# Patient Record
Sex: FEMALE | Race: WHITE | NOT HISPANIC OR LATINO | URBAN - METROPOLITAN AREA
[De-identification: names, ages, dates, MRNs, and addresses within clinical notes are randomized per-mention and may not be internally consistent; named-entity substitution may affect disease eponyms.]

---

## 2017-04-08 ENCOUNTER — EMERGENCY (EMERGENCY)
Facility: HOSPITAL | Age: 32
LOS: 1 days | Discharge: HOME | End: 2017-04-08
Attending: INTERNAL MEDICINE | Admitting: INTERNAL MEDICINE
Payer: COMMERCIAL

## 2017-04-08 VITALS
RESPIRATION RATE: 16 BRPM | DIASTOLIC BLOOD PRESSURE: 77 MMHG | OXYGEN SATURATION: 99 % | HEART RATE: 80 BPM | SYSTOLIC BLOOD PRESSURE: 118 MMHG | TEMPERATURE: 99 F

## 2017-04-08 VITALS
RESPIRATION RATE: 16 BRPM | DIASTOLIC BLOOD PRESSURE: 67 MMHG | OXYGEN SATURATION: 98 % | HEART RATE: 90 BPM | WEIGHT: 125 LBS | TEMPERATURE: 98 F | SYSTOLIC BLOOD PRESSURE: 112 MMHG | HEIGHT: 62 IN

## 2017-04-08 DIAGNOSIS — Z88.0 ALLERGY STATUS TO PENICILLIN: ICD-10-CM

## 2017-04-08 DIAGNOSIS — R07.9 CHEST PAIN, UNSPECIFIED: ICD-10-CM

## 2017-04-08 DIAGNOSIS — R07.89 OTHER CHEST PAIN: ICD-10-CM

## 2017-04-08 DIAGNOSIS — Z91.09 OTHER ALLERGY STATUS, OTHER THAN TO DRUGS AND BIOLOGICAL SUBSTANCES: ICD-10-CM

## 2017-04-08 LAB
ALBUMIN SERPL ELPH-MCNC: 3.9 G/DL — SIGNIFICANT CHANGE UP (ref 3.3–5)
ALP SERPL-CCNC: 82 U/L — SIGNIFICANT CHANGE UP (ref 40–120)
ALT FLD-CCNC: 22 U/L — SIGNIFICANT CHANGE UP (ref 12–78)
ANION GAP SERPL CALC-SCNC: 11 MMOL/L — SIGNIFICANT CHANGE UP (ref 5–17)
AST SERPL-CCNC: 15 U/L — SIGNIFICANT CHANGE UP (ref 15–37)
BILIRUB SERPL-MCNC: 0.4 MG/DL — SIGNIFICANT CHANGE UP (ref 0.2–1.2)
BUN SERPL-MCNC: 7 MG/DL — SIGNIFICANT CHANGE UP (ref 7–23)
CALCIUM SERPL-MCNC: 8.7 MG/DL — SIGNIFICANT CHANGE UP (ref 8.5–10.1)
CHLORIDE SERPL-SCNC: 107 MMOL/L — SIGNIFICANT CHANGE UP (ref 96–108)
CK SERPL-CCNC: 46 U/L — SIGNIFICANT CHANGE UP (ref 26–192)
CO2 SERPL-SCNC: 23 MMOL/L — SIGNIFICANT CHANGE UP (ref 22–31)
CREAT SERPL-MCNC: 0.68 MG/DL — SIGNIFICANT CHANGE UP (ref 0.5–1.3)
D DIMER BLD IA.RAPID-MCNC: <150 NG/ML DDU — SIGNIFICANT CHANGE UP
GLUCOSE SERPL-MCNC: 113 MG/DL — HIGH (ref 70–99)
HCG SERPL-ACNC: <1 MIU/ML — SIGNIFICANT CHANGE UP
HCT VFR BLD CALC: 38.6 % — SIGNIFICANT CHANGE UP (ref 34.5–45)
HGB BLD-MCNC: 12.9 G/DL — SIGNIFICANT CHANGE UP (ref 11.5–15.5)
MCHC RBC-ENTMCNC: 30.5 PG — SIGNIFICANT CHANGE UP (ref 27–34)
MCHC RBC-ENTMCNC: 33.4 GM/DL — SIGNIFICANT CHANGE UP (ref 32–36)
MCV RBC AUTO: 91.3 FL — SIGNIFICANT CHANGE UP (ref 80–100)
PLATELET # BLD AUTO: 286 K/UL — SIGNIFICANT CHANGE UP (ref 150–400)
POTASSIUM SERPL-MCNC: 3.5 MMOL/L — SIGNIFICANT CHANGE UP (ref 3.5–5.3)
POTASSIUM SERPL-SCNC: 3.5 MMOL/L — SIGNIFICANT CHANGE UP (ref 3.5–5.3)
PROT SERPL-MCNC: 7 G/DL — SIGNIFICANT CHANGE UP (ref 6–8.3)
RBC # BLD: 4.23 M/UL — SIGNIFICANT CHANGE UP (ref 3.8–5.2)
RBC # FLD: 10.5 % — SIGNIFICANT CHANGE UP (ref 10.3–14.5)
SODIUM SERPL-SCNC: 141 MMOL/L — SIGNIFICANT CHANGE UP (ref 135–145)
TROPONIN I SERPL-MCNC: 0.03 NG/ML — SIGNIFICANT CHANGE UP (ref 0.01–0.04)
WBC # BLD: 9.6 K/UL — SIGNIFICANT CHANGE UP (ref 3.8–10.5)
WBC # FLD AUTO: 9.6 K/UL — SIGNIFICANT CHANGE UP (ref 3.8–10.5)

## 2017-04-08 PROCEDURE — 71045 X-RAY EXAM CHEST 1 VIEW: CPT

## 2017-04-08 PROCEDURE — 99284 EMERGENCY DEPT VISIT MOD MDM: CPT | Mod: 25

## 2017-04-08 PROCEDURE — 93005 ELECTROCARDIOGRAM TRACING: CPT

## 2017-04-08 PROCEDURE — 99285 EMERGENCY DEPT VISIT HI MDM: CPT | Mod: 25

## 2017-04-08 PROCEDURE — 96374 THER/PROPH/DIAG INJ IV PUSH: CPT

## 2017-04-08 PROCEDURE — 80053 COMPREHEN METABOLIC PANEL: CPT

## 2017-04-08 PROCEDURE — 71010: CPT | Mod: 26

## 2017-04-08 PROCEDURE — 84484 ASSAY OF TROPONIN QUANT: CPT

## 2017-04-08 PROCEDURE — 85379 FIBRIN DEGRADATION QUANT: CPT

## 2017-04-08 PROCEDURE — 85027 COMPLETE CBC AUTOMATED: CPT

## 2017-04-08 PROCEDURE — 84702 CHORIONIC GONADOTROPIN TEST: CPT

## 2017-04-08 PROCEDURE — 82550 ASSAY OF CK (CPK): CPT

## 2017-04-08 RX ORDER — KETOROLAC TROMETHAMINE 30 MG/ML
30 SYRINGE (ML) INJECTION ONCE
Qty: 0 | Refills: 0 | Status: DISCONTINUED | OUTPATIENT
Start: 2017-04-08 | End: 2017-04-08

## 2017-04-08 RX ORDER — IBUPROFEN 200 MG
1 TABLET ORAL
Qty: 30 | Refills: 0 | OUTPATIENT
Start: 2017-04-08 | End: 2017-04-18

## 2017-04-08 RX ORDER — CYCLOBENZAPRINE HYDROCHLORIDE 10 MG/1
10 TABLET, FILM COATED ORAL ONCE
Qty: 0 | Refills: 0 | Status: COMPLETED | OUTPATIENT
Start: 2017-04-08 | End: 2017-04-08

## 2017-04-08 RX ORDER — CYCLOBENZAPRINE HYDROCHLORIDE 10 MG/1
1 TABLET, FILM COATED ORAL
Qty: 30 | Refills: 0 | OUTPATIENT
Start: 2017-04-08 | End: 2017-04-18

## 2017-04-08 RX ADMIN — Medication 30 MILLIGRAM(S): at 06:53

## 2017-04-08 RX ADMIN — CYCLOBENZAPRINE HYDROCHLORIDE 10 MILLIGRAM(S): 10 TABLET, FILM COATED ORAL at 06:53

## 2017-04-08 NOTE — ED PROVIDER NOTE - CONSTITUTIONAL, MLM
Well appearing, well nourished, awake, alert, oriented to person, place, time/situation and in moderate  distress. normal...

## 2017-04-08 NOTE — ED ADULT NURSE NOTE - CAS TRG GEN SKIN COLOR
Bedside and Verbal shift change report given to STALIN Guajardo RN (oncoming nurse) by Indigo Georges RN  (offgoing nurse). Report included the following information SBAR, Kardex, Intake/Output and MAR. Normal for race

## 2017-04-08 NOTE — ED PROVIDER NOTE - OBJECTIVE STATEMENT
32 y/o w/f h/o allergies, she awoke with left chest pain, reproducible with palpation and inspiration

## 2017-04-08 NOTE — ED PROVIDER NOTE - SIGNIFICANT NEGATIVE FINDINGS
no headache, no syncope , no SOB, no palpitations, no n/v/d, no urinary symptoms, no bleeding. no neuro changes.

## 2017-04-08 NOTE — ED ADULT NURSE NOTE - OBJECTIVE STATEMENT
31 year old female presents to the ED with c/o chest pain. Pt states she was woken up by severe chest pain that radiated down the left arm. Pt A+O x 4. Slightly anxious. Pt has an allergy to Penecillin and other unknown allergies yet to be determined. Pt denies headache. Respirations even and unlabored. S1/S2 normal. Pt reports slight nausea and 2 bouts of diarrhea this morning. No swelling/ edema noted. skin, nails, and lips free of pallor/ cyanosis. VSS. Pt LMP 3 weeks ago.

## 2018-11-20 ENCOUNTER — EMERGENCY (EMERGENCY)
Facility: HOSPITAL | Age: 33
LOS: 1 days | Discharge: HOME | End: 2018-11-20
Attending: EMERGENCY MEDICINE
Payer: COMMERCIAL

## 2018-11-20 VITALS
SYSTOLIC BLOOD PRESSURE: 129 MMHG | WEIGHT: 130.07 LBS | RESPIRATION RATE: 15 BRPM | DIASTOLIC BLOOD PRESSURE: 85 MMHG | HEART RATE: 99 BPM | OXYGEN SATURATION: 98 % | HEIGHT: 62 IN | TEMPERATURE: 98 F

## 2018-11-20 VITALS
DIASTOLIC BLOOD PRESSURE: 71 MMHG | RESPIRATION RATE: 14 BRPM | HEART RATE: 95 BPM | OXYGEN SATURATION: 96 % | TEMPERATURE: 98 F | SYSTOLIC BLOOD PRESSURE: 106 MMHG

## 2018-11-20 LAB
ALBUMIN SERPL ELPH-MCNC: 4.1 G/DL — SIGNIFICANT CHANGE UP (ref 3.3–5)
ALP SERPL-CCNC: 72 U/L — SIGNIFICANT CHANGE UP (ref 40–120)
ALT FLD-CCNC: 31 U/L — SIGNIFICANT CHANGE UP (ref 12–78)
ANION GAP SERPL CALC-SCNC: 10 MMOL/L — SIGNIFICANT CHANGE UP (ref 5–17)
AST SERPL-CCNC: 22 U/L — SIGNIFICANT CHANGE UP (ref 15–37)
BILIRUB SERPL-MCNC: 0.4 MG/DL — SIGNIFICANT CHANGE UP (ref 0.2–1.2)
BUN SERPL-MCNC: 9 MG/DL — SIGNIFICANT CHANGE UP (ref 7–23)
CALCIUM SERPL-MCNC: 8.9 MG/DL — SIGNIFICANT CHANGE UP (ref 8.5–10.1)
CHLORIDE SERPL-SCNC: 104 MMOL/L — SIGNIFICANT CHANGE UP (ref 96–108)
CK SERPL-CCNC: 45 U/L — SIGNIFICANT CHANGE UP (ref 26–192)
CO2 SERPL-SCNC: 26 MMOL/L — SIGNIFICANT CHANGE UP (ref 22–31)
CREAT SERPL-MCNC: 0.81 MG/DL — SIGNIFICANT CHANGE UP (ref 0.5–1.3)
D DIMER BLD IA.RAPID-MCNC: <150 NG/ML DDU — SIGNIFICANT CHANGE UP
GLUCOSE SERPL-MCNC: 107 MG/DL — HIGH (ref 70–99)
HCG SERPL-ACNC: <1 MIU/ML — SIGNIFICANT CHANGE UP
HCT VFR BLD CALC: 39.3 % — SIGNIFICANT CHANGE UP (ref 34.5–45)
HGB BLD-MCNC: 13.4 G/DL — SIGNIFICANT CHANGE UP (ref 11.5–15.5)
MCHC RBC-ENTMCNC: 30 PG — SIGNIFICANT CHANGE UP (ref 27–34)
MCHC RBC-ENTMCNC: 34.1 GM/DL — SIGNIFICANT CHANGE UP (ref 32–36)
MCV RBC AUTO: 87.9 FL — SIGNIFICANT CHANGE UP (ref 80–100)
NRBC # BLD: 0 /100 WBCS — SIGNIFICANT CHANGE UP (ref 0–0)
PLATELET # BLD AUTO: 355 K/UL — SIGNIFICANT CHANGE UP (ref 150–400)
POTASSIUM SERPL-MCNC: 3.9 MMOL/L — SIGNIFICANT CHANGE UP (ref 3.5–5.3)
POTASSIUM SERPL-SCNC: 3.9 MMOL/L — SIGNIFICANT CHANGE UP (ref 3.5–5.3)
PROT SERPL-MCNC: 7.9 G/DL — SIGNIFICANT CHANGE UP (ref 6–8.3)
RBC # BLD: 4.47 M/UL — SIGNIFICANT CHANGE UP (ref 3.8–5.2)
RBC # FLD: 11.3 % — SIGNIFICANT CHANGE UP (ref 10.3–14.5)
SODIUM SERPL-SCNC: 140 MMOL/L — SIGNIFICANT CHANGE UP (ref 135–145)
TROPONIN I SERPL-MCNC: <.015 NG/ML — SIGNIFICANT CHANGE UP (ref 0.01–0.04)
WBC # BLD: 12.67 K/UL — HIGH (ref 3.8–10.5)
WBC # FLD AUTO: 12.67 K/UL — HIGH (ref 3.8–10.5)

## 2018-11-20 PROCEDURE — 82550 ASSAY OF CK (CPK): CPT

## 2018-11-20 PROCEDURE — 96361 HYDRATE IV INFUSION ADD-ON: CPT

## 2018-11-20 PROCEDURE — 93005 ELECTROCARDIOGRAM TRACING: CPT

## 2018-11-20 PROCEDURE — 84484 ASSAY OF TROPONIN QUANT: CPT

## 2018-11-20 PROCEDURE — 84702 CHORIONIC GONADOTROPIN TEST: CPT

## 2018-11-20 PROCEDURE — 71275 CT ANGIOGRAPHY CHEST: CPT

## 2018-11-20 PROCEDURE — 93010 ELECTROCARDIOGRAM REPORT: CPT

## 2018-11-20 PROCEDURE — 99285 EMERGENCY DEPT VISIT HI MDM: CPT

## 2018-11-20 PROCEDURE — 71046 X-RAY EXAM CHEST 2 VIEWS: CPT

## 2018-11-20 PROCEDURE — 99284 EMERGENCY DEPT VISIT MOD MDM: CPT

## 2018-11-20 PROCEDURE — 85027 COMPLETE CBC AUTOMATED: CPT

## 2018-11-20 PROCEDURE — 71046 X-RAY EXAM CHEST 2 VIEWS: CPT | Mod: 26

## 2018-11-20 PROCEDURE — 36415 COLL VENOUS BLD VENIPUNCTURE: CPT

## 2018-11-20 PROCEDURE — 71275 CT ANGIOGRAPHY CHEST: CPT | Mod: 26

## 2018-11-20 PROCEDURE — 99284 EMERGENCY DEPT VISIT MOD MDM: CPT | Mod: 25

## 2018-11-20 PROCEDURE — 80053 COMPREHEN METABOLIC PANEL: CPT

## 2018-11-20 PROCEDURE — 85379 FIBRIN DEGRADATION QUANT: CPT

## 2018-11-20 PROCEDURE — 96360 HYDRATION IV INFUSION INIT: CPT | Mod: XU

## 2018-11-20 RX ORDER — SODIUM CHLORIDE 9 MG/ML
1000 INJECTION INTRAMUSCULAR; INTRAVENOUS; SUBCUTANEOUS ONCE
Qty: 0 | Refills: 0 | Status: COMPLETED | OUTPATIENT
Start: 2018-11-20 | End: 2018-11-20

## 2018-11-20 RX ADMIN — SODIUM CHLORIDE 1000 MILLILITER(S): 9 INJECTION INTRAMUSCULAR; INTRAVENOUS; SUBCUTANEOUS at 16:04

## 2018-11-20 RX ADMIN — SODIUM CHLORIDE 1000 MILLILITER(S): 9 INJECTION INTRAMUSCULAR; INTRAVENOUS; SUBCUTANEOUS at 14:18

## 2018-11-20 NOTE — ED PROVIDER NOTE - CHPI ED SYMPTOMS NEG
no chills/no dizziness/no shortness of breath/no diaphoresis/no vomiting/no back pain/no nausea/no chest pain/no cough/no syncope/no fever

## 2018-11-20 NOTE — ED PROVIDER NOTE - CARE PROVIDER_API CALL
Kosta Costa), Internal Medicine  [address] [place] [address]  Phone: [phone]  Fax: [phone]

## 2018-11-20 NOTE — ED PROVIDER NOTE - RESPIRATORY, MLM
Breath sounds clear and equal bilaterally. no W/r/R. nl resp effort. no acc muscle use. Breath sounds clear and equal bilaterally. no W/r/R. nl resp effort. no acc muscle use. Some reproducible tend.

## 2018-11-20 NOTE — ED ADULT NURSE NOTE - OBJECTIVE STATEMENT
c/o ,midsternal chest pain SOB since last night.  states has been treated for respinfection but symptoms has been getting worsePt also reports syncopal episode this am

## 2018-11-20 NOTE — CONSULT NOTE ADULT - ASSESSMENT
33 F no PMH w chest pain.  - CP is reproducible on exam. No signs of ischemia on EKG.   - No need to repeat Jory.   - Likely musculoskeletal from cough  - Conservative RX with NSAIDS PRN/ Tyelonol  - Cont Rx for URI  - Near syncope likely from increased vagal tone   - Encourage PO hydration.   - If ED calvo negative can get an outpt echo.   - Patient agrees with the plan and will contact our office to arrange followup.

## 2018-11-20 NOTE — CONSULT NOTE ADULT - SUBJECTIVE AND OBJECTIVE BOX
CHIEF COMPLAINT: Patient is a 33y old  Female who presents with a chief complaint of chest pain    HPI: 33 F no PMH p/w chest pain. She has been having a URI for abvout 1 week. [facility] of dyspnea pleuritic chest pain. She was given dose of steroids and SDoxy. She has been taking robitussen DM. She notes last night all o a sudden had a midsternal, left sided (under breast() sharp chest pain that was worse with deep breathing. Started to feel nausea and lightheaded. Then sxs continued. went to work were felt very lightheaded when standing. She ahd a near syncopal episode and came to ed. Denies any  palpitations, PND, orthopnea,   syncope, lower extremity edema, stroke like symptoms.   Currently feels better after ivf.       EKG: SR     REVIEW OF SYSTEMS:   All other review of systems are negative unless indicated above    PAST MEDICAL & SURGICAL HISTORY:  No pertinent past medical history      SOCIAL HISTORY:  No tobacco, ethanol, or drug abuse.    FAMILY HISTORY:    No family history of acute MI or sudden cardiac death.           Allergies    penicillin (Short breath)  severely allg to mold  Intolerances        Home meds:  Home Medications:  ZyrTEC:  (20 Nov 2018 13:31)        VITAL SIGNS:   Vital Signs Last 24 Hrs  T(C): 36.6 (20 Nov 2018 13:27), Max: 36.6 (20 Nov 2018 13:27)  T(F): 97.8 (20 Nov 2018 13:27), Max: 97.8 (20 Nov 2018 13:27)  HR: 99 (20 Nov 2018 13:27) (99 - 99)  BP: 129/85 (20 Nov 2018 13:27) (129/85 - 129/85)  BP(mean): --  RR: 15 (20 Nov 2018 13:27) (15 - 15)  SpO2: 98% (20 Nov 2018 13:27) (98% - 98%)    I&O's Summary      On Exam:     Constitutional: NAD, awake   HEENT: Dry Mucous Membranes, Anicteric  Pulmonary: Non-labored, breath sounds are clear bilaterally, No wheezing, rales or rhonchi  Cardiovascular: Regular, S1 and S2, No murmurs, rubs, gallops or clicks  Gastrointestinal: Bowel Sounds present, soft, nontender.   Lymph: No peripheral edema. No lymphadenopathy.  Skin: No visible rashes or ulcers.  Psych:  Mood & affect appropriate  Muscular: reproducible chest pain    LABS: All Labs Reviewed:                        13.4   12.67 )-----------( 355      ( 20 Nov 2018 14:19 )             39.3     20 Nov 2018 14:19    140    |  104    |  9      ----------------------------<  107    3.9     |  26     |  0.81     Ca    8.9        20 Nov 2018 14:19    TPro  7.9    /  Alb  4.1    /  TBili  0.4    /  DBili  x      /  AST  22     /  ALT  31     /  AlkPhos  72     20 Nov 2018 14:19      CARDIAC MARKERS ( 20 Nov 2018 14:19 )  <.015 ng/mL / x     / 45 U/L / x     / x          Blood Culture:         RADIOLOGY:

## 2018-11-20 NOTE — ED PROVIDER NOTE - PROGRESS NOTE DETAILS
Pt seen bY Dr VALERIE Costa, ok to [facility], for outpt fu. Pt seen bY alka Blackwell to [place] [facility], for outpt fu.  At length discussion with patient regarding nature of the chest pain. Discussed results of all diagnostic testing in ED. Discussed chest pain precautions and instructions. Patient demonstrates understanding that a cardiac cause of the chest pain has not been totally excluded, but at this time there is no evidence to warrant an inpatient workup.  Discussed the importance of continued follow-up with Cardiology as outpatient to complete cardiac workup.

## 2018-11-20 NOTE — ED ADULT TRIAGE NOTE - CHIEF COMPLAINT QUOTE
midsternal chest pain, shortness of breath and dizziness since last night, syncopal episode this morning at work

## 2018-11-20 NOTE — ED PROVIDER NOTE - NSFOLLOWUPINSTRUCTIONS_ED_ALL_ED_FT
1)  Follow-up with your Primary Medical Doctor or referred doctor. Call today / next business day for prompt follow-up.  2) Follow-up with Cardiology as discussed. Call today / next business day for prompt follow-up and further workup and evaluation.  3) Return to Emergency room for any worsening or persistent pain, shortness of breath, weakness, fever, abdominal pain, dizziness, passing out, unexplained pain in arms, legs, back, any vomiting, feeling like your heart is racing,  or any other concerning symptoms.  4) See attached instruction sheets for additional information, including information regarding signs and symptoms to look out for, reasons to seek immediate care and other important instructions.   5) Motrin as needed, with food

## 2018-11-20 NOTE — ED PROVIDER NOTE - OBJECTIVE STATEMENT
34 yo F p/w sp recent URI, on abx. Was feeling better past few days. Pt awoke this am ~ 1am, was co chest heaviness, dyspnea. Pt then got lightheaded, but didn't pass out. Then again with episode of severe CP while at work, dizziness, followed by a syncopal episode. Pt co mild cp now, no other acute co. No fever/chills. No numb/ting/focal weak. no agg/allev factors. NO other inj or co. no other prior events, no recent RIOS / easy fatigue.    No recent travel / immob

## 2018-11-24 NOTE — ED ADULT TRIAGE NOTE - NSWEIGHTCALCTOOLDRUG_GEN_A_CORE
Message   Recorded as Task   Date: 06/24/2017 05:20 PM, Created By: Georgina,User   Task Name: Hospital Discharge Alert   Assigned To: BILLIE BLACKWELL   Regarding Patient: QUIN ROMERO, [facility], location: [facility] on: Sat Luther CST      used

## 2019-11-13 ENCOUNTER — EMERGENCY (EMERGENCY)
Facility: HOSPITAL | Age: 34
LOS: 1 days | Discharge: HOME | End: 2019-11-13
Attending: EMERGENCY MEDICINE | Admitting: EMERGENCY MEDICINE
Payer: COMMERCIAL

## 2019-11-13 VITALS
TEMPERATURE: 100 F | OXYGEN SATURATION: 97 % | HEART RATE: 88 BPM | SYSTOLIC BLOOD PRESSURE: 120 MMHG | WEIGHT: 134.92 LBS | RESPIRATION RATE: 16 BRPM | HEIGHT: 62 IN | DIASTOLIC BLOOD PRESSURE: 63 MMHG

## 2019-11-13 VITALS
OXYGEN SATURATION: 98 % | SYSTOLIC BLOOD PRESSURE: 103 MMHG | DIASTOLIC BLOOD PRESSURE: 64 MMHG | RESPIRATION RATE: 14 BRPM | HEART RATE: 78 BPM | TEMPERATURE: 98 F

## 2019-11-13 LAB
ALBUMIN SERPL ELPH-MCNC: 4.3 G/DL — SIGNIFICANT CHANGE UP (ref 3.3–5)
ALP SERPL-CCNC: 91 U/L — SIGNIFICANT CHANGE UP (ref 40–120)
ALT FLD-CCNC: 24 U/L — SIGNIFICANT CHANGE UP (ref 12–78)
ANION GAP SERPL CALC-SCNC: 7 MMOL/L — SIGNIFICANT CHANGE UP (ref 5–17)
APPEARANCE UR: CLEAR — SIGNIFICANT CHANGE UP
AST SERPL-CCNC: 19 U/L — SIGNIFICANT CHANGE UP (ref 15–37)
BASOPHILS # BLD AUTO: 0.07 K/UL — SIGNIFICANT CHANGE UP (ref 0–0.2)
BASOPHILS NFR BLD AUTO: 0.7 % — SIGNIFICANT CHANGE UP (ref 0–2)
BILIRUB SERPL-MCNC: 0.5 MG/DL — SIGNIFICANT CHANGE UP (ref 0.2–1.2)
BILIRUB UR-MCNC: ABNORMAL
BUN SERPL-MCNC: 11 MG/DL — SIGNIFICANT CHANGE UP (ref 7–23)
CALCIUM SERPL-MCNC: 8.8 MG/DL — SIGNIFICANT CHANGE UP (ref 8.5–10.1)
CHLORIDE SERPL-SCNC: 105 MMOL/L — SIGNIFICANT CHANGE UP (ref 96–108)
CO2 SERPL-SCNC: 25 MMOL/L — SIGNIFICANT CHANGE UP (ref 22–31)
COLOR SPEC: YELLOW — SIGNIFICANT CHANGE UP
CREAT SERPL-MCNC: 0.95 MG/DL — SIGNIFICANT CHANGE UP (ref 0.5–1.3)
DIFF PNL FLD: ABNORMAL
EOSINOPHIL # BLD AUTO: 0.22 K/UL — SIGNIFICANT CHANGE UP (ref 0–0.5)
EOSINOPHIL NFR BLD AUTO: 2.1 % — SIGNIFICANT CHANGE UP (ref 0–6)
GLUCOSE SERPL-MCNC: 97 MG/DL — SIGNIFICANT CHANGE UP (ref 70–99)
GLUCOSE UR QL: NEGATIVE — SIGNIFICANT CHANGE UP
HCG SERPL-ACNC: <1 MIU/ML — SIGNIFICANT CHANGE UP
HCT VFR BLD CALC: 39.5 % — SIGNIFICANT CHANGE UP (ref 34.5–45)
HGB BLD-MCNC: 13.3 G/DL — SIGNIFICANT CHANGE UP (ref 11.5–15.5)
IMM GRANULOCYTES NFR BLD AUTO: 0.2 % — SIGNIFICANT CHANGE UP (ref 0–1.5)
KETONES UR-MCNC: ABNORMAL
LACTATE SERPL-SCNC: 0.8 MMOL/L — SIGNIFICANT CHANGE UP (ref 0.7–2)
LEUKOCYTE ESTERASE UR-ACNC: ABNORMAL
LYMPHOCYTES # BLD AUTO: 2.22 K/UL — SIGNIFICANT CHANGE UP (ref 1–3.3)
LYMPHOCYTES # BLD AUTO: 21.1 % — SIGNIFICANT CHANGE UP (ref 13–44)
MCHC RBC-ENTMCNC: 29.5 PG — SIGNIFICANT CHANGE UP (ref 27–34)
MCHC RBC-ENTMCNC: 33.7 GM/DL — SIGNIFICANT CHANGE UP (ref 32–36)
MCV RBC AUTO: 87.6 FL — SIGNIFICANT CHANGE UP (ref 80–100)
MONOCYTES # BLD AUTO: 0.64 K/UL — SIGNIFICANT CHANGE UP (ref 0–0.9)
MONOCYTES NFR BLD AUTO: 6.1 % — SIGNIFICANT CHANGE UP (ref 2–14)
NEUTROPHILS # BLD AUTO: 7.36 K/UL — SIGNIFICANT CHANGE UP (ref 1.8–7.4)
NEUTROPHILS NFR BLD AUTO: 69.8 % — SIGNIFICANT CHANGE UP (ref 43–77)
NITRITE UR-MCNC: NEGATIVE — SIGNIFICANT CHANGE UP
NRBC # BLD: 0 /100 WBCS — SIGNIFICANT CHANGE UP (ref 0–0)
PH UR: 6 — SIGNIFICANT CHANGE UP (ref 5–8)
PLATELET # BLD AUTO: 328 K/UL — SIGNIFICANT CHANGE UP (ref 150–400)
POTASSIUM SERPL-MCNC: 4.4 MMOL/L — SIGNIFICANT CHANGE UP (ref 3.5–5.3)
POTASSIUM SERPL-SCNC: 4.4 MMOL/L — SIGNIFICANT CHANGE UP (ref 3.5–5.3)
PROT SERPL-MCNC: 7.9 G/DL — SIGNIFICANT CHANGE UP (ref 6–8.3)
PROT UR-MCNC: 25 MG/DL
RBC # BLD: 4.51 M/UL — SIGNIFICANT CHANGE UP (ref 3.8–5.2)
RBC # FLD: 11.8 % — SIGNIFICANT CHANGE UP (ref 10.3–14.5)
SODIUM SERPL-SCNC: 137 MMOL/L — SIGNIFICANT CHANGE UP (ref 135–145)
SP GR SPEC: 1.02 — SIGNIFICANT CHANGE UP (ref 1.01–1.02)
UROBILINOGEN FLD QL: 1
WBC # BLD: 10.53 K/UL — HIGH (ref 3.8–10.5)
WBC # FLD AUTO: 10.53 K/UL — HIGH (ref 3.8–10.5)

## 2019-11-13 PROCEDURE — 87086 URINE CULTURE/COLONY COUNT: CPT

## 2019-11-13 PROCEDURE — 85027 COMPLETE CBC AUTOMATED: CPT

## 2019-11-13 PROCEDURE — 81001 URINALYSIS AUTO W/SCOPE: CPT

## 2019-11-13 PROCEDURE — 96375 TX/PRO/DX INJ NEW DRUG ADDON: CPT

## 2019-11-13 PROCEDURE — 74176 CT ABD & PELVIS W/O CONTRAST: CPT

## 2019-11-13 PROCEDURE — 80053 COMPREHEN METABOLIC PANEL: CPT

## 2019-11-13 PROCEDURE — 74176 CT ABD & PELVIS W/O CONTRAST: CPT | Mod: 26

## 2019-11-13 PROCEDURE — 84702 CHORIONIC GONADOTROPIN TEST: CPT

## 2019-11-13 PROCEDURE — 96374 THER/PROPH/DIAG INJ IV PUSH: CPT

## 2019-11-13 PROCEDURE — 99284 EMERGENCY DEPT VISIT MOD MDM: CPT | Mod: 25

## 2019-11-13 PROCEDURE — 83605 ASSAY OF LACTIC ACID: CPT

## 2019-11-13 PROCEDURE — 87040 BLOOD CULTURE FOR BACTERIA: CPT

## 2019-11-13 PROCEDURE — 76856 US EXAM PELVIC COMPLETE: CPT | Mod: 26

## 2019-11-13 PROCEDURE — 99284 EMERGENCY DEPT VISIT MOD MDM: CPT

## 2019-11-13 PROCEDURE — 36415 COLL VENOUS BLD VENIPUNCTURE: CPT

## 2019-11-13 PROCEDURE — 96361 HYDRATE IV INFUSION ADD-ON: CPT

## 2019-11-13 PROCEDURE — 76856 US EXAM PELVIC COMPLETE: CPT

## 2019-11-13 RX ORDER — MORPHINE SULFATE 50 MG/1
4 CAPSULE, EXTENDED RELEASE ORAL ONCE
Refills: 0 | Status: DISCONTINUED | OUTPATIENT
Start: 2019-11-13 | End: 2019-11-13

## 2019-11-13 RX ORDER — ONDANSETRON 8 MG/1
4 TABLET, FILM COATED ORAL ONCE
Refills: 0 | Status: COMPLETED | OUTPATIENT
Start: 2019-11-13 | End: 2019-11-13

## 2019-11-13 RX ORDER — SODIUM CHLORIDE 9 MG/ML
1000 INJECTION INTRAMUSCULAR; INTRAVENOUS; SUBCUTANEOUS ONCE
Refills: 0 | Status: COMPLETED | OUTPATIENT
Start: 2019-11-13 | End: 2019-11-13

## 2019-11-13 RX ORDER — ONDANSETRON 8 MG/1
1 TABLET, FILM COATED ORAL
Qty: 12 | Refills: 0
Start: 2019-11-13 | End: 2019-11-15

## 2019-11-13 RX ORDER — OXYCODONE AND ACETAMINOPHEN 5; 325 MG/1; MG/1
1 TABLET ORAL ONCE
Refills: 0 | Status: DISCONTINUED | OUTPATIENT
Start: 2019-11-13 | End: 2019-11-13

## 2019-11-13 RX ADMIN — SODIUM CHLORIDE 1000 MILLILITER(S): 9 INJECTION INTRAMUSCULAR; INTRAVENOUS; SUBCUTANEOUS at 15:00

## 2019-11-13 RX ADMIN — MORPHINE SULFATE 4 MILLIGRAM(S): 50 CAPSULE, EXTENDED RELEASE ORAL at 15:12

## 2019-11-13 RX ADMIN — ONDANSETRON 4 MILLIGRAM(S): 8 TABLET, FILM COATED ORAL at 15:13

## 2019-11-13 RX ADMIN — SODIUM CHLORIDE 1000 MILLILITER(S): 9 INJECTION INTRAMUSCULAR; INTRAVENOUS; SUBCUTANEOUS at 16:00

## 2019-11-13 RX ADMIN — MORPHINE SULFATE 4 MILLIGRAM(S): 50 CAPSULE, EXTENDED RELEASE ORAL at 18:34

## 2019-11-13 RX ADMIN — OXYCODONE AND ACETAMINOPHEN 1 TABLET(S): 5; 325 TABLET ORAL at 18:34

## 2019-11-13 NOTE — ED ADULT NURSE NOTE - OBJECTIVE STATEMENT
Received the patient in the Er. Patient is alert and oriented. Skin warm and dry. C/O Left flank pain. Patient is diagnosed with Kidney stone.

## 2019-11-13 NOTE — ED PROVIDER NOTE - OBJECTIVE STATEMENT
34 y female presents with left flank pain, states pain began saturday, noted urgency, frequency, hematuria, fever tmax 2 days ago 101.   was seen in urgent care Sunday, had ct done.  result faxed to ED: " 0.2 cm left renal calculus, nonobstructive, no hydro, no bladder calculus.  Copious stool in the colon, right ovary prominence, can be secondary to dominant follicle".  [facility] was started on bactrim  abx, No PMD, No urologist

## 2019-11-13 NOTE — ED PROVIDER NOTE - PATIENT PORTAL LINK FT
You can access the FollowMyHealth Patient Portal offered by [facility] by registering at the following website: [email or web address]. By joining [employer]’s FollowMyHealth portal, you will also be able to view your health information using other applications (apps) compatible with our system.

## 2019-11-13 NOTE — ED ADULT TRIAGE NOTE - CHIEF COMPLAINT QUOTE
patient came in ED with c/o persistent left flank pain X 2 days with nausea and vomiting and low grade fever. on arrival temp is 100.0F. patient was diagnosed with Kidney Stone last Sunday.

## 2019-11-13 NOTE — ED PROVIDER NOTE - PROGRESS NOTE DETAILS
results discussed, ct .2 mm nonobstructing stone, us normal, copy of results given, patient is on bactrim , rx percocet, zofran sent to pharmacy, copy of results given,  given information for urologist Dr Boston, call tomorrow to arrange follow up , any worsening symptoms return to ED

## 2019-11-14 LAB
CULTURE RESULTS: SIGNIFICANT CHANGE UP
SPECIMEN SOURCE: SIGNIFICANT CHANGE UP

## 2019-11-18 LAB
CULTURE RESULTS: SIGNIFICANT CHANGE UP
CULTURE RESULTS: SIGNIFICANT CHANGE UP
SPECIMEN SOURCE: SIGNIFICANT CHANGE UP
SPECIMEN SOURCE: SIGNIFICANT CHANGE UP

## 2020-02-18 NOTE — ED ADULT NURSE NOTE - CADM POA URETHRAL CATHETER
No Afib    BPH (benign prostatic hyperplasia)    CHF (congestive heart failure)    Glaucoma    Hearing loss    Osteoporosis Afib    BPH (benign prostatic hyperplasia)    CHF (congestive heart failure)    Glaucoma    Hearing loss    Inguinal hernia  right  Osteoporosis

## 2020-06-08 NOTE — ED ADULT TRIAGE NOTE - AS O2 DELIVERY
Patient Education     Hernia (Adult)    A hernia can happen when there is a weakness or defect in the wall of the abdomen or groin. Intestines or nearby tissues may move from their usual location and push through the weakness in the wall. This can cause a hernia (bulge) you may see or feel.  Causes and risk factors   A hernia may be present at birth. Or it may be caused by the wear and tear of daily living. Certain factors can make a hernia more likely. These can include:  · Heavy lifting  · Straining, whether from lifting, movement, or constipation  · Chronic cough  · Injury to the abdominal wall  · Excess weight  · Pregnancy  · Prior surgery  · Older age  · Family history of hernia  Symptoms  Symptoms of a hernia may come on suddenly. Or they may appear slowly over time. Some common symptoms include:  · Bulge in the groin area, around the navel, or in the scrotum (the bulge may get bigger when you stand and go away when you lie down)  · Pain or pressure around the bulge  · Pain during activities such as lifting, coughing, or sneezing  · A feeling of weakness or pressure in the groin  · Pain or swelling in the scrotum  Types of hernias  There are different types of hernia. The type you have depends on its location:  · Inguinal. This type is in the groin or scrotum. It is more common in men. But, women can get this hernia, too.  · Femoral. This type is in the groin, upper thigh (where the leg bends), or labia. It is more common in women.  · Ventral. This type is in the abdominal wall.  · Umbilical. This type occurs around the navel (belly button).  · Incisional. This type occurs at the site of a previous surgery.  The condition of the hernia can help determine how urgently it needs to be treated.  · Reducible. It goes back in by itself, or it can be pushed back in.  · Irreducible. It can’t be pushed back in.  · Incarcerated/strangulated. The intestine is trapped (incarcerated). If this happens, you won’t be able  to push the bulge back in. If the incarcerated hernia isn’t treated, it may become strangulated. This means the area loses blood supply and the tissue may die. This requires emergency surgery. You need treatment right away.  In most cases, a hernia will not heal on its own.You may need surgery to repair the defect in the abdominal wall or groin. You’ll be told more about surgery, if needed.  If your symptoms are not severe, treatment may sometimes be delayed. In such cases, you will need regular follow-up visits with the provider. You’ll be asked to keep track of your symptoms and to watch for signs of more serious problems. You may also be given guidelines similar to the home care instructions below.  Home care  To help keep a hernia from getting worse, you may be advised to:  · Avoid heavy lifting and straining as directed.  · Take steps to prevent constipation, such as eating more fiber and drinking more water. This may help reduce straining that can occur when having a bowel movement. Reducing straining may help keep your symptoms from getting worse.  · Maintain a healthy weight or lose excess weight. This can help reduce strain on abdominal muscles and tissues.  · Stop smoking. This can help prevent coughing that may also strain abdominal muscles and tissues.  Follow-up care  Follow up with your healthcare provider, or as directed. If imaging tests were done, they will be reviewed a doctor. You will be told the results and any new findings that may affect your care.  When to seek medical advice  Call your healthcare provider right away if any of these occur:  · Hernia hardens, swells, or grows larger  · Hernia can no longer be pushed back in  · Pain moves to the lower right abdomen (just below the waistline), or spreads to the back  Call 911  Call 911 if any of these occur:  · Severe pain, redness, or tenderness in the area near the hernia  · Pain worsens quickly and doesn’t get better  · Inability to have a  bowel movement or pass gas  · Fever of 100.4°F (38°C) or higher, or as directed by your healthcare provider  Date Last Reviewed: 3/1/2018  © 5488-3941 [email or web address]. [address] [place] [address], [place] [address]. All rights reserved. This information is not intended as a substitute for professional medical care. Always follow your healthcare professional's instructions.            room air

## 2020-07-18 ENCOUNTER — EMERGENCY (EMERGENCY)
Facility: HOSPITAL | Age: 35
LOS: 1 days | Discharge: HOME | End: 2020-07-18
Attending: EMERGENCY MEDICINE | Admitting: EMERGENCY MEDICINE
Payer: COMMERCIAL

## 2020-07-18 VITALS
SYSTOLIC BLOOD PRESSURE: 110 MMHG | WEIGHT: 139.99 LBS | OXYGEN SATURATION: 95 % | HEART RATE: 73 BPM | HEIGHT: 62 IN | TEMPERATURE: 98 F | RESPIRATION RATE: 15 BRPM | DIASTOLIC BLOOD PRESSURE: 79 MMHG

## 2020-07-18 VITALS
DIASTOLIC BLOOD PRESSURE: 71 MMHG | TEMPERATURE: 99 F | SYSTOLIC BLOOD PRESSURE: 105 MMHG | RESPIRATION RATE: 18 BRPM | HEART RATE: 63 BPM | OXYGEN SATURATION: 97 %

## 2020-07-18 LAB
ALBUMIN SERPL ELPH-MCNC: 4.1 G/DL — SIGNIFICANT CHANGE UP (ref 3.3–5)
ALP SERPL-CCNC: 70 U/L — SIGNIFICANT CHANGE UP (ref 40–120)
ALT FLD-CCNC: 18 U/L — SIGNIFICANT CHANGE UP (ref 12–78)
ANION GAP SERPL CALC-SCNC: 4 MMOL/L — LOW (ref 5–17)
APPEARANCE UR: CLEAR — SIGNIFICANT CHANGE UP
AST SERPL-CCNC: 14 U/L — LOW (ref 15–37)
BASOPHILS # BLD AUTO: 0.06 K/UL — SIGNIFICANT CHANGE UP (ref 0–0.2)
BASOPHILS NFR BLD AUTO: 0.6 % — SIGNIFICANT CHANGE UP (ref 0–2)
BILIRUB SERPL-MCNC: 0.5 MG/DL — SIGNIFICANT CHANGE UP (ref 0.2–1.2)
BILIRUB UR-MCNC: NEGATIVE — SIGNIFICANT CHANGE UP
BUN SERPL-MCNC: 10 MG/DL — SIGNIFICANT CHANGE UP (ref 7–23)
CALCIUM SERPL-MCNC: 9.2 MG/DL — SIGNIFICANT CHANGE UP (ref 8.5–10.1)
CHLORIDE SERPL-SCNC: 108 MMOL/L — SIGNIFICANT CHANGE UP (ref 96–108)
CO2 SERPL-SCNC: 28 MMOL/L — SIGNIFICANT CHANGE UP (ref 22–31)
COLOR SPEC: YELLOW — SIGNIFICANT CHANGE UP
CREAT SERPL-MCNC: 0.8 MG/DL — SIGNIFICANT CHANGE UP (ref 0.5–1.3)
DIFF PNL FLD: NEGATIVE — SIGNIFICANT CHANGE UP
EOSINOPHIL # BLD AUTO: 0.17 K/UL — SIGNIFICANT CHANGE UP (ref 0–0.5)
EOSINOPHIL NFR BLD AUTO: 1.8 % — SIGNIFICANT CHANGE UP (ref 0–6)
GLUCOSE SERPL-MCNC: 105 MG/DL — HIGH (ref 70–99)
GLUCOSE UR QL: NEGATIVE — SIGNIFICANT CHANGE UP
HCG UR QL: NEGATIVE — SIGNIFICANT CHANGE UP
HCT VFR BLD CALC: 38.5 % — SIGNIFICANT CHANGE UP (ref 34.5–45)
HGB BLD-MCNC: 13.4 G/DL — SIGNIFICANT CHANGE UP (ref 11.5–15.5)
IMM GRANULOCYTES NFR BLD AUTO: 0.3 % — SIGNIFICANT CHANGE UP (ref 0–1.5)
KETONES UR-MCNC: NEGATIVE — SIGNIFICANT CHANGE UP
LEUKOCYTE ESTERASE UR-ACNC: NEGATIVE — SIGNIFICANT CHANGE UP
LIDOCAIN IGE QN: 115 U/L — SIGNIFICANT CHANGE UP (ref 73–393)
LYMPHOCYTES # BLD AUTO: 2.79 K/UL — SIGNIFICANT CHANGE UP (ref 1–3.3)
LYMPHOCYTES # BLD AUTO: 29 % — SIGNIFICANT CHANGE UP (ref 13–44)
MCHC RBC-ENTMCNC: 30.2 PG — SIGNIFICANT CHANGE UP (ref 27–34)
MCHC RBC-ENTMCNC: 34.8 GM/DL — SIGNIFICANT CHANGE UP (ref 32–36)
MCV RBC AUTO: 86.9 FL — SIGNIFICANT CHANGE UP (ref 80–100)
MONOCYTES # BLD AUTO: 0.56 K/UL — SIGNIFICANT CHANGE UP (ref 0–0.9)
MONOCYTES NFR BLD AUTO: 5.8 % — SIGNIFICANT CHANGE UP (ref 2–14)
NEUTROPHILS # BLD AUTO: 6.01 K/UL — SIGNIFICANT CHANGE UP (ref 1.8–7.4)
NEUTROPHILS NFR BLD AUTO: 62.5 % — SIGNIFICANT CHANGE UP (ref 43–77)
NITRITE UR-MCNC: NEGATIVE — SIGNIFICANT CHANGE UP
NRBC # BLD: 0 /100 WBCS — SIGNIFICANT CHANGE UP (ref 0–0)
PH UR: 6 — SIGNIFICANT CHANGE UP (ref 5–8)
PLATELET # BLD AUTO: 302 K/UL — SIGNIFICANT CHANGE UP (ref 150–400)
POTASSIUM SERPL-MCNC: 3.7 MMOL/L — SIGNIFICANT CHANGE UP (ref 3.5–5.3)
POTASSIUM SERPL-SCNC: 3.7 MMOL/L — SIGNIFICANT CHANGE UP (ref 3.5–5.3)
PROT SERPL-MCNC: 8 G/DL — SIGNIFICANT CHANGE UP (ref 6–8.3)
PROT UR-MCNC: NEGATIVE — SIGNIFICANT CHANGE UP
RBC # BLD: 4.43 M/UL — SIGNIFICANT CHANGE UP (ref 3.8–5.2)
RBC # FLD: 11.6 % — SIGNIFICANT CHANGE UP (ref 10.3–14.5)
SODIUM SERPL-SCNC: 140 MMOL/L — SIGNIFICANT CHANGE UP (ref 135–145)
SP GR SPEC: 1.01 — SIGNIFICANT CHANGE UP (ref 1.01–1.02)
UROBILINOGEN FLD QL: NEGATIVE — SIGNIFICANT CHANGE UP
WBC # BLD: 9.62 K/UL — SIGNIFICANT CHANGE UP (ref 3.8–10.5)
WBC # FLD AUTO: 9.62 K/UL — SIGNIFICANT CHANGE UP (ref 3.8–10.5)

## 2020-07-18 PROCEDURE — 83690 ASSAY OF LIPASE: CPT

## 2020-07-18 PROCEDURE — 74176 CT ABD & PELVIS W/O CONTRAST: CPT

## 2020-07-18 PROCEDURE — 74176 CT ABD & PELVIS W/O CONTRAST: CPT | Mod: 26

## 2020-07-18 PROCEDURE — 80053 COMPREHEN METABOLIC PANEL: CPT

## 2020-07-18 PROCEDURE — 96374 THER/PROPH/DIAG INJ IV PUSH: CPT | Mod: XU

## 2020-07-18 PROCEDURE — 36415 COLL VENOUS BLD VENIPUNCTURE: CPT

## 2020-07-18 PROCEDURE — 76856 US EXAM PELVIC COMPLETE: CPT | Mod: 26

## 2020-07-18 PROCEDURE — 76856 US EXAM PELVIC COMPLETE: CPT

## 2020-07-18 PROCEDURE — 99284 EMERGENCY DEPT VISIT MOD MDM: CPT | Mod: 25

## 2020-07-18 PROCEDURE — 81025 URINE PREGNANCY TEST: CPT

## 2020-07-18 PROCEDURE — 81003 URINALYSIS AUTO W/O SCOPE: CPT

## 2020-07-18 PROCEDURE — 85027 COMPLETE CBC AUTOMATED: CPT

## 2020-07-18 PROCEDURE — 99285 EMERGENCY DEPT VISIT HI MDM: CPT

## 2020-07-18 PROCEDURE — 96375 TX/PRO/DX INJ NEW DRUG ADDON: CPT

## 2020-07-18 PROCEDURE — 84702 CHORIONIC GONADOTROPIN TEST: CPT

## 2020-07-18 PROCEDURE — 96361 HYDRATE IV INFUSION ADD-ON: CPT

## 2020-07-18 RX ORDER — KETOROLAC TROMETHAMINE 30 MG/ML
30 SYRINGE (ML) INJECTION ONCE
Refills: 0 | Status: DISCONTINUED | OUTPATIENT
Start: 2020-07-18 | End: 2020-07-18

## 2020-07-18 RX ORDER — SODIUM CHLORIDE 9 MG/ML
1000 INJECTION INTRAMUSCULAR; INTRAVENOUS; SUBCUTANEOUS ONCE
Refills: 0 | Status: COMPLETED | OUTPATIENT
Start: 2020-07-18 | End: 2020-07-18

## 2020-07-18 RX ORDER — ONDANSETRON 8 MG/1
4 TABLET, FILM COATED ORAL ONCE
Refills: 0 | Status: COMPLETED | OUTPATIENT
Start: 2020-07-18 | End: 2020-07-18

## 2020-07-18 RX ADMIN — Medication 30 MILLIGRAM(S): at 18:15

## 2020-07-18 RX ADMIN — SODIUM CHLORIDE 1000 MILLILITER(S): 9 INJECTION INTRAMUSCULAR; INTRAVENOUS; SUBCUTANEOUS at 16:08

## 2020-07-18 RX ADMIN — Medication 30 MILLIGRAM(S): at 18:45

## 2020-07-18 RX ADMIN — ONDANSETRON 4 MILLIGRAM(S): 8 TABLET, FILM COATED ORAL at 16:08

## 2020-07-18 RX ADMIN — SODIUM CHLORIDE 1000 MILLILITER(S): 9 INJECTION INTRAMUSCULAR; INTRAVENOUS; SUBCUTANEOUS at 17:12

## 2020-07-18 NOTE — ED PROVIDER NOTE - PHYSICAL EXAMINATION
Constitutional: Awake, Alert, non-toxic. NAD. Well appearing, well nourished.   HEAD: Normocephalic, atraumatic.   EYES: EOM intact, conjunctiva and sclera are clear bilaterally.   ENT: No rhinorrhea, patent, mucous membranes pink/moist, no drooling or stridor.   NECK: Supple, non-tender  CARDIOVASCULAR: Normal S1, S2; regular rate and rhythm.  RESPIRATORY: Normal respiratory effort; breath sounds CTAB, no wheezes, rhonchi, or rales. Speaking in full sentences. No accessory muscle use.   ABDOMEN: Soft; (+) right inguinal TTP, no palpable hernia, (+) pain with flexion of right hip, (+) healed RLQ surgical scar, negative CVA TTP, no RUQ TTP, negative Pacheco sign.   EXTREMITIES: Full passive and active ROM in all extremities; non-tender to palpation; distal pulses palpable and symmetric  SKIN: Warm, dry; good skin turgor, no apparent lesions or rashes, no ecchymosis, brisk capillary refill.  NEURO: A&O x3. Sensory and motor functions are grossly intact. Speech is normal. Appearance and judgement seem appropriate for gender and age.

## 2020-07-18 NOTE — ED PROVIDER NOTE - ATTENDING CONTRIBUTION TO CARE
35 yo white female, developed left lower back area pain last week that then wrapped around to right side and then into RLQ and felt like a pop. Since that time, movement increases pain, immobilization helps pain. No fever, chills, nausea, vomiting, diarrhea, vaginal bleeding, dysuria or hematuria. I agree with plan and management outlined by PA.

## 2020-07-18 NOTE — ED ADULT NURSE NOTE - OBJECTIVE STATEMENT
Pt received in bed alert and oriented with the c/o upper abd pain and flank pain. Pt states that pain started 3 days ago and it seems to be getting progressively worse. As per Md's orders IV robert placed blood specimen obtained and sent to the lab. Meds given and tolerated well. Pt now prepped for US. Nursing care ongoing and safety maintained.

## 2020-07-18 NOTE — ED PROVIDER NOTE - OBJECTIVE STATEMENT
35 y/o female without reported PMHx sent from urgent care c/o abdominal pain x 1 week. Pt reports she last Sunday she had pain in lower left back which improved. pt reports she right sided lower abd pain, non-radiating to back, and currently 8/10. pt reports urgent care recommended CT due to concern for hernia and US due to concern for ovarian cyst. pt notes she has hx of kidney stones in which this does not feel similar. pt reports pain mainly in right lower pelvis region, hx of appendectomy. pt admits to nausea. Pt denies dysuria, hematuria, fall, incontinence, numbness/weakness, vomiting, fever, vaginal dc, or any other complaints.

## 2020-07-18 NOTE — ED PROVIDER NOTE - CARE PROVIDER_API CALL
Narciso Wesley ()  Internal Medicine  [address] [place] [address]  Phone: [phone]  Fax: [phone]  Follow Up Time: 1-3 Days

## 2020-07-18 NOTE — ED PROVIDER NOTE - PATIENT PORTAL LINK FT
You can access the FollowMyHealth Patient Portal offered by [facility] by registering at the following website: [email or web address]. By joining [employer]’s FollowMyHealth portal, you will also be able to view your health information using other applications (apps) compatible with our system.

## 2020-07-18 NOTE — ED PROVIDER NOTE - CLINICAL SUMMARY MEDICAL DECISION MAKING FREE TEXT BOX
sent from urgent care c/o abdominal pain x 1 week. Pt reports she last Sunday she had pain in lower left back which improved. pt reports she right sided lower abd pain, non-radiating to back, and currently 8/10. pt reports urgent care recommended CT due to concern for hernia and US due to concern for ovarian cyst. PLan includes labs, US r/o ovarian cyst, UA r/o UTI, urine pregnancy, re-assess

## 2020-07-18 NOTE — ED PROVIDER NOTE - NSFOLLOWUPINSTRUCTIONS_ED_ALL_ED_FT
Follow up with your primary care doctor and Gastroenterologist as soon as possible. Return to ED for any worsening condition.     Many things can cause abdominal pain. Many times, abdominal pain is not caused by a disease and will improve without treatment. Your health care provider will do a physical exam to determine if there is a dangerous cause of your pain; blood tests and imaging may help determine the cause of your pain. However, in many cases, no cause may be found and you may need further testing as an outpatient. Monitor your abdominal pain for any changes.     SEEK IMMEDIATE MEDICAL CARE IF YOU HAVE ANY OF THE FOLLOWING SYMPTOMS: worsening abdominal pain, uncontrollable vomiting, profuse diarrhea, inability to have bowel movements or pass gas, black or bloody stools, fever accompanying chest pain or back pain, or fainting. These symptoms may represent a serious problem that is an emergency. Do not wait to see if the symptoms will go away. Get medical help right away. Call 911 and do not drive yourself to the hospital.

## 2020-11-14 NOTE — ED ADULT TRIAGE NOTE - HEIGHT IN FEET
5
Follow up with your primary care doctor within 1 week  Take Bactrim 1 tablet twice daily for 7 days  Return to the ER with any worsening or concerning symptoms, fever/chills, throat pain or swelling, facial swelling or any other concerns.

## 2022-01-04 ENCOUNTER — APPOINTMENT (OUTPATIENT)
Dept: INTERNAL MEDICINE | Facility: CLINIC | Age: 37
End: 2022-01-04
Payer: COMMERCIAL

## 2022-01-04 PROCEDURE — 99204 OFFICE O/P NEW MOD 45 MIN: CPT | Mod: 95

## 2022-01-04 NOTE — HISTORY OF PRESENT ILLNESS
[Home] : at home, [unfilled] , at the time of the visit. [Medical Office: ([facility])___] : at the medical office located in  [Verbal consent obtained from patient] : the patient, [unfilled] [FreeTextEntry1] : 36 year old female with no significant medical history was diagnosed with COVID on 12/22.  She has had lingering cough, chest tightness, and overall not feeling well since then.  She has been monitoring her pulse ox and it has been around 96/97.  Upon ambulation her Pulse ox sustained at 97 but she states last night it was maintaining 92.  She also did a teledoc video yesterday and  was given albuterol and a nasal spray.  \par \par Patient appeared well on video, good color. She is eating and drinking normall. \par She did receive only one JNJ shot in April. \par \par At this time will do home blood work and will follow up in 2 days. \par Decadon given for 5 days. \par willl also add advair\par \par number to the [facility] provided if additional information is required within the next two days

## 2022-01-07 ENCOUNTER — APPOINTMENT (OUTPATIENT)
Dept: INTERNAL MEDICINE | Facility: CLINIC | Age: 37
End: 2022-01-07
Payer: COMMERCIAL

## 2022-01-07 LAB
ALBUMIN SERPL ELPH-MCNC: 4.8 G/DL
ALP BLD-CCNC: 77 U/L
ALT SERPL-CCNC: 24 U/L
ANION GAP SERPL CALC-SCNC: 13 MMOL/L
AST SERPL-CCNC: 21 U/L
BASOPHILS # BLD AUTO: 0.03 K/UL
BASOPHILS NFR BLD AUTO: 0.4 %
BILIRUB SERPL-MCNC: 0.3 MG/DL
BUN SERPL-MCNC: 9 MG/DL
CALCIUM SERPL-MCNC: 10 MG/DL
CHLORIDE SERPL-SCNC: 105 MMOL/L
CO2 SERPL-SCNC: 22 MMOL/L
CREAT SERPL-MCNC: 0.8 MG/DL
CRP SERPL-MCNC: <3 MG/L
DEPRECATED D DIMER PPP IA-ACNC: <150 NG/ML DDU
EOSINOPHIL # BLD AUTO: 0.11 K/UL
EOSINOPHIL NFR BLD AUTO: 1.5 %
FERRITIN SERPL-MCNC: 85 NG/ML
GLUCOSE SERPL-MCNC: 106 MG/DL
HCT VFR BLD CALC: 38.3 %
HGB BLD-MCNC: 12.8 G/DL
IMM GRANULOCYTES NFR BLD AUTO: 0.1 %
LYMPHOCYTES # BLD AUTO: 1.52 K/UL
LYMPHOCYTES NFR BLD AUTO: 20.4 %
MAN DIFF?: NORMAL
MCHC RBC-ENTMCNC: 30.1 PG
MCHC RBC-ENTMCNC: 33.4 GM/DL
MCV RBC AUTO: 90.1 FL
MONOCYTES # BLD AUTO: 0.23 K/UL
MONOCYTES NFR BLD AUTO: 3.1 %
NEUTROPHILS # BLD AUTO: 5.54 K/UL
NEUTROPHILS NFR BLD AUTO: 74.5 %
PLATELET # BLD AUTO: 334 K/UL
POTASSIUM SERPL-SCNC: 4.5 MMOL/L
PROT SERPL-MCNC: 7.3 G/DL
RBC # BLD: 4.25 M/UL
RBC # FLD: 11.7 %
SODIUM SERPL-SCNC: 141 MMOL/L
WBC # FLD AUTO: 7.44 K/UL

## 2022-01-07 PROCEDURE — 99213 OFFICE O/P EST LOW 20 MIN: CPT | Mod: 95

## 2022-01-07 NOTE — HISTORY OF PRESENT ILLNESS
[FreeTextEntry1] : 36 year old female diagnosed with COVID 12/22.  She currently is finally starting to feel better.  She states two nights ago she had an episode of chest pain.  She has been feeling well since and her pulse ox has been in the high 90s.  She has been taking medications as prescribed but not the albuterol because it is making her very jumpy. \par She does have a lingering cough which is worse at night making it hard for her to sleep. \par She will be out of work longer due to these symptoms. \par Overall she looks well. On ambulation her oxygen is maintaining high 90s. \par we will fu in 1 week

## 2022-01-13 ENCOUNTER — APPOINTMENT (OUTPATIENT)
Dept: PULMONOLOGY | Facility: CLINIC | Age: 37
End: 2022-01-13
Payer: COMMERCIAL

## 2022-01-13 PROCEDURE — 99213 OFFICE O/P EST LOW 20 MIN: CPT | Mod: 95

## 2022-01-13 NOTE — HISTORY OF PRESENT ILLNESS
[Home] : at home, [unfilled] , at the time of the visit. [Medical Office: ([facility])___] : at the medical office located in  [Verbal consent obtained from patient] : the patient, [unfilled] [FreeTextEntry1] : Follow-up telehealth visit, now post-COVID.\par \par 36-year-old woman, 1 J&J vaccine in spring, + December 22.\par \par Initial symptoms had including chest tightness, shortness of breath and cough.  She was treated with steroids, which greatly helped.  She has not been using her prescribed inhalers, since her respiratory symptoms have all completely resolved.  Only remaining complaint at this time is some residual headaches and brain fog.\par \par On exam, she looks well and in no apparent distress.\par \par Post COVID infection.  December.  Currently no respiratory symptoms, they have all resolved after treatment with steroids.\par Residual intermittent headaches brain fogginess typical post-COVID not yet 1 month out, no specific therapy, only time

## 2022-01-15 ENCOUNTER — APPOINTMENT (OUTPATIENT)
Dept: INTERNAL MEDICINE | Facility: CLINIC | Age: 37
End: 2022-01-15

## 2022-01-20 ENCOUNTER — NON-APPOINTMENT (OUTPATIENT)
Age: 37
End: 2022-01-20

## 2022-10-05 ENCOUNTER — APPOINTMENT (OUTPATIENT)
Dept: INTERNAL MEDICINE | Facility: CLINIC | Age: 37
End: 2022-10-05

## 2022-10-05 ENCOUNTER — NON-APPOINTMENT (OUTPATIENT)
Age: 37
End: 2022-10-05

## 2022-10-05 VITALS
HEART RATE: 84 BPM | WEIGHT: 150 LBS | TEMPERATURE: 97.3 F | DIASTOLIC BLOOD PRESSURE: 72 MMHG | RESPIRATION RATE: 16 BRPM | HEIGHT: 62 IN | OXYGEN SATURATION: 98 % | BODY MASS INDEX: 27.6 KG/M2 | SYSTOLIC BLOOD PRESSURE: 126 MMHG

## 2022-10-05 DIAGNOSIS — Z00.00 ENCOUNTER FOR GENERAL ADULT MEDICAL EXAMINATION W/OUT ABNORMAL FINDINGS: ICD-10-CM

## 2022-10-05 PROCEDURE — 99385 PREV VISIT NEW AGE 18-39: CPT

## 2022-10-05 RX ORDER — PROMETHAZINE HYDROCHLORIDE AND DEXTROMETHORPHAN HYDROBROMIDE ORAL SOLUTION 15; 6.25 MG/5ML; MG/5ML
6.25-15 SOLUTION ORAL
Qty: 1 | Refills: 0 | Status: COMPLETED | COMMUNITY
Start: 2022-01-07 | End: 2022-10-05

## 2022-10-05 RX ORDER — DEXAMETHASONE 6 MG/1
6 TABLET ORAL DAILY
Qty: 5 | Refills: 0 | Status: COMPLETED | COMMUNITY
Start: 2022-01-04 | End: 2022-10-05

## 2022-10-05 RX ORDER — FLUTICASONE PROPIONATE AND SALMETEROL 50; 100 UG/1; UG/1
100-50 POWDER RESPIRATORY (INHALATION)
Qty: 1 | Refills: 0 | Status: COMPLETED | COMMUNITY
Start: 2022-01-04 | End: 2022-10-05

## 2022-10-05 NOTE — ASSESSMENT
[FreeTextEntry1] : Hemochromatosis: She will follow-up with Dr Roberts. Check CBC, iron/ferritin. \par Adult hypothyroidism: On levothyroxine. Check TFTs. Will discuss results. \par HCM: Up to date on pap smear. Check labs. Will discuss results. \par

## 2022-10-05 NOTE — HEALTH RISK ASSESSMENT
[Good] : ~his/her~  mood as  good [Never] : Never [Yes] : Yes [2 - 4 times a month (2 pts)] : 2-4 times a month (2 points) [1 or 2 (0 pts)] : 1 or 2 (0 points) [Never (0 pts)] : Never (0 points) [0] : 2) Feeling down, depressed, or hopeless: Not at all (0) [PHQ-2 Negative - No further assessment needed] : PHQ-2 Negative - No further assessment needed [Patient reported PAP Smear was normal] : Patient reported PAP Smear was normal [Fully functional (bathing, dressing, toileting, transferring, walking, feeding)] : Fully functional (bathing, dressing, toileting, transferring, walking, feeding) [Fully functional (using the telephone, shopping, preparing meals, housekeeping, doing laundry, using] : Fully functional and needs no help or supervision to perform IADLs (using the telephone, shopping, preparing meals, housekeeping, doing laundry, using transportation, managing medications and managing finances) [[id number]] : 0 [Change in mental status noted] : No change in mental status noted [PapSmearDate] : 09/22

## 2022-10-05 NOTE — REVIEW OF SYSTEMS
[Fever] : no fever [Chills] : no chills [Night Sweats] : no night sweats [Discharge] : no discharge [Vision Problems] : no vision problems [Itching] : no itching [Earache] : no earache [Nasal Discharge] : no nasal discharge [Sore Throat] : no sore throat [Chest Pain] : no chest pain [Palpitations] : no palpitations [Lower Ext Edema] : no lower extremity edema [Shortness Of Breath] : no shortness of breath [Wheezing] : no wheezing [Cough] : no cough [Dyspnea on Exertion] : no dyspnea on exertion [Abdominal Pain] : no abdominal pain [Nausea] : no nausea [Constipation] : no constipation [Diarrhea] : diarrhea [Vomiting] : no vomiting [Dysuria] : no dysuria [Muscle Weakness] : no muscle weakness [Muscle Pain] : no muscle pain [Itching] : no itching [Skin Rash] : no skin rash [Headache] : no headache [Dizziness] : no dizziness [Suicidal] : not suicidal [Anxiety] : no anxiety [Depression] : no depression [Easy Bleeding] : no easy bleeding [Easy Bruising] : no easy bruising [Swollen Glands] : no swollen glands [FreeTextEntry7] : bloating

## 2022-10-05 NOTE — PHYSICAL EXAM
[No Acute Distress] : no acute distress [Normal Sclera/Conjunctiva] : normal sclera/conjunctiva [PERRL] : pupils equal round and reactive to light [EOMI] : extraocular movements intact [Normal TMs] : both tympanic membranes were normal [No Lymphadenopathy] : no lymphadenopathy [Supple] : supple [Thyroid Normal, No Nodules] : the thyroid was normal and there were no nodules present [No Respiratory Distress] : no respiratory distress  [No Accessory Muscle Use] : no accessory muscle use [Clear to Auscultation] : lungs were clear to auscultation bilaterally [Normal Rate] : normal rate  [Regular Rhythm] : with a regular rhythm [Normal S1, S2] : normal S1 and S2 [No Murmur] : no murmur heard [No Edema] : there was no peripheral edema [Soft] : abdomen soft [Non Tender] : non-tender [Non-distended] : non-distended [Normal Bowel Sounds] : normal bowel sounds [Normal Posterior Cervical Nodes] : no posterior cervical lymphadenopathy [Normal Anterior Cervical Nodes] : no anterior cervical lymphadenopathy [No Spinal Tenderness] : no spinal tenderness [Grossly Normal Strength/Tone] : grossly normal strength/tone [No Skin Lesions] : no skin lesions [No Focal Deficits] : no focal deficits [Normal Gait] : normal gait [Deep Tendon Reflexes (DTR)] : deep tendon reflexes were 2+ and symmetric [Normal Affect] : the affect was normal [Normal Insight/Judgement] : insight and judgment were intact

## 2022-10-06 ENCOUNTER — TRANSCRIPTION ENCOUNTER (OUTPATIENT)
Age: 37
End: 2022-10-06

## 2022-10-06 LAB
ALBUMIN SERPL ELPH-MCNC: 4.9 G/DL
ALP BLD-CCNC: 86 U/L
ALT SERPL-CCNC: 12 U/L
ANION GAP SERPL CALC-SCNC: 13 MMOL/L
AST SERPL-CCNC: 16 U/L
BASOPHILS # BLD AUTO: 0.07 K/UL
BASOPHILS NFR BLD AUTO: 1 %
BILIRUB SERPL-MCNC: 0.3 MG/DL
BUN SERPL-MCNC: 9 MG/DL
CALCIUM SERPL-MCNC: 9.9 MG/DL
CHLORIDE SERPL-SCNC: 103 MMOL/L
CHOLEST SERPL-MCNC: 222 MG/DL
CO2 SERPL-SCNC: 24 MMOL/L
CREAT SERPL-MCNC: 0.74 MG/DL
EGFR: 107 ML/MIN/1.73M2
EOSINOPHIL # BLD AUTO: 0.24 K/UL
EOSINOPHIL NFR BLD AUTO: 3.3 %
ESTIMATED AVERAGE GLUCOSE: 114 MG/DL
FERRITIN SERPL-MCNC: 50 NG/ML
GLUCOSE SERPL-MCNC: 107 MG/DL
HBA1C MFR BLD HPLC: 5.6 %
HCT VFR BLD CALC: 41.1 %
HDLC SERPL-MCNC: 50 MG/DL
HGB BLD-MCNC: 13.9 G/DL
IMM GRANULOCYTES NFR BLD AUTO: 0.1 %
IRON SATN MFR SERPL: 30 %
IRON SERPL-MCNC: 94 UG/DL
LDLC SERPL CALC-MCNC: 146 MG/DL
LYMPHOCYTES # BLD AUTO: 2.46 K/UL
LYMPHOCYTES NFR BLD AUTO: 33.8 %
MAN DIFF?: NORMAL
MCHC RBC-ENTMCNC: 31.2 PG
MCHC RBC-ENTMCNC: 33.8 GM/DL
MCV RBC AUTO: 92.4 FL
MONOCYTES # BLD AUTO: 0.43 K/UL
MONOCYTES NFR BLD AUTO: 5.9 %
NEUTROPHILS # BLD AUTO: 4.06 K/UL
NEUTROPHILS NFR BLD AUTO: 55.9 %
NONHDLC SERPL-MCNC: 172 MG/DL
PLATELET # BLD AUTO: 326 K/UL
POTASSIUM SERPL-SCNC: 4.3 MMOL/L
PROT SERPL-MCNC: 7.2 G/DL
RBC # BLD: 4.45 M/UL
RBC # FLD: 11.9 %
SODIUM SERPL-SCNC: 140 MMOL/L
T3 SERPL-MCNC: 93 NG/DL
THYROPEROXIDASE AB SERPL IA-ACNC: 960 IU/ML
TIBC SERPL-MCNC: 318 UG/DL
TRIGL SERPL-MCNC: 127 MG/DL
TSH SERPL-ACNC: 3.77 UIU/ML
UIBC SERPL-MCNC: 223 UG/DL
WBC # FLD AUTO: 7.27 K/UL

## 2022-10-09 LAB — T4 FREE SERPL-MCNC: 1.3 NG/DL

## 2023-01-27 ENCOUNTER — APPOINTMENT (OUTPATIENT)
Dept: INTERNAL MEDICINE | Facility: CLINIC | Age: 38
End: 2023-01-27
Payer: COMMERCIAL

## 2023-01-27 PROCEDURE — 99212 OFFICE O/P EST SF 10 MIN: CPT | Mod: 95

## 2023-01-27 NOTE — REVIEW OF SYSTEMS
[Negative] : Psychiatric [FreeTextEntry2] : see hpi  [FreeTextEntry4] : see hpi  [FreeTextEntry6] : see hpi

## 2023-01-27 NOTE — HISTORY OF PRESENT ILLNESS
[FreeTextEntry8] : This visit was provided via telehealth using real time 2-way audio/visual technology. The patient, MORE ANDREW was located at home, [address] [place] [address], at the time of the visit. The patient, MORE ANDREW and Provider participated in the telehealth encounter. The patient MORE ANDREW  provided verbal consent for this telehealth encounter.\par \par Patient is a 37 year old female with history of hypothyroidism, hemochromatosis who tested positive for COVID yesterday, Symptoms started on Tuesday. She has a fever, chills, body aches, nasal congestion, and cough. Denies any chest pain, palpitations, SOB. She is concerned as this is her fourth time having COVID and she states that the last time she had it she took a long time to recover and developed PNA that required antibiotics.

## 2023-01-27 NOTE — PLAN
[FreeTextEntry1] : COVID: symptoms appear to be mild however given her history of worsening COVID infection with superimposed bacterial PNA in the past, will treat with Paxlovid. Continue supportive measures, Tylenol PRN for fevers, Advil, OTC decongestants, nasal sprays. ER precautions provided. \par \par I have spent 15 minutes on this encounter.

## 2023-02-14 ENCOUNTER — APPOINTMENT (OUTPATIENT)
Dept: INTERNAL MEDICINE | Facility: CLINIC | Age: 38
End: 2023-02-14
Payer: COMMERCIAL

## 2023-02-14 VITALS
OXYGEN SATURATION: 98 % | HEART RATE: 80 BPM | WEIGHT: 175 LBS | BODY MASS INDEX: 32.2 KG/M2 | RESPIRATION RATE: 14 BRPM | HEIGHT: 62 IN | SYSTOLIC BLOOD PRESSURE: 120 MMHG | DIASTOLIC BLOOD PRESSURE: 74 MMHG | TEMPERATURE: 97.9 F

## 2023-02-14 PROCEDURE — 99213 OFFICE O/P EST LOW 20 MIN: CPT

## 2023-02-14 RX ORDER — NIRMATRELVIR AND RITONAVIR 300-100 MG
20 X 150 MG & KIT ORAL
Qty: 1 | Refills: 0 | Status: COMPLETED | COMMUNITY
Start: 2023-01-27 | End: 2023-02-14

## 2023-02-14 NOTE — ASSESSMENT
[FreeTextEntry1] : COVID: Resolving. Lungs clear. Follow-up if symptoms persist. \par Hypothyroidism: Check TFTs. Will adjust levothyroxine as needed. \par

## 2023-02-14 NOTE — HISTORY OF PRESENT ILLNESS
[Other: ___] : [unfilled] [FreeTextEntry6] : Reports fatigue and decreased exercise tolerance and temperature dysregulation after COVID. Denies CP, dyspnea. \par For hypothyroidism, notes weight gain (25 lbs). Has been on levothyroxine.

## 2023-02-14 NOTE — REVIEW OF SYSTEMS
[Fever] : no fever [Chills] : no chills [Fatigue] : fatigue [Night Sweats] : no night sweats [Chest Pain] : no chest pain [Palpitations] : no palpitations [Wheezing] : no wheezing [Cough] : no cough [Abdominal Pain] : no abdominal pain [Nausea] : no nausea [Constipation] : no constipation [Diarrhea] : diarrhea [Vomiting] : no vomiting [Dysuria] : no dysuria

## 2023-02-15 LAB
T3 SERPL-MCNC: 90 NG/DL
T4 FREE SERPL-MCNC: 1 NG/DL
THYROPEROXIDASE AB SERPL IA-ACNC: 930 IU/ML
TSH SERPL-ACNC: 3.52 UIU/ML

## 2023-06-22 ENCOUNTER — RX RENEWAL (OUTPATIENT)
Age: 38
End: 2023-06-22

## 2023-10-20 ENCOUNTER — NON-APPOINTMENT (OUTPATIENT)
Age: 38
End: 2023-10-20

## 2023-10-22 ENCOUNTER — EMERGENCY (EMERGENCY)
Facility: HOSPITAL | Age: 38
LOS: 0 days | Discharge: HOME | End: 2023-10-22
Attending: EMERGENCY MEDICINE
Payer: COMMERCIAL

## 2023-10-22 ENCOUNTER — RX RENEWAL (OUTPATIENT)
Age: 38
End: 2023-10-22

## 2023-10-22 VITALS
HEIGHT: 66 IN | WEIGHT: 160.06 LBS | SYSTOLIC BLOOD PRESSURE: 119 MMHG | TEMPERATURE: 98 F | OXYGEN SATURATION: 99 % | RESPIRATION RATE: 18 BRPM | DIASTOLIC BLOOD PRESSURE: 74 MMHG | HEART RATE: 70 BPM

## 2023-10-22 VITALS
DIASTOLIC BLOOD PRESSURE: 64 MMHG | OXYGEN SATURATION: 98 % | SYSTOLIC BLOOD PRESSURE: 113 MMHG | HEART RATE: 70 BPM | TEMPERATURE: 99 F | RESPIRATION RATE: 19 BRPM

## 2023-10-22 DIAGNOSIS — Z20.822 CONTACT WITH AND (SUSPECTED) EXPOSURE TO COVID-19: ICD-10-CM

## 2023-10-22 DIAGNOSIS — R20.2 PARESTHESIA OF SKIN: ICD-10-CM

## 2023-10-22 DIAGNOSIS — R53.1 WEAKNESS: ICD-10-CM

## 2023-10-22 DIAGNOSIS — A87.9 VIRAL MENINGITIS, UNSPECIFIED: ICD-10-CM

## 2023-10-22 DIAGNOSIS — E03.9 HYPOTHYROIDISM, UNSPECIFIED: ICD-10-CM

## 2023-10-22 DIAGNOSIS — Z88.0 ALLERGY STATUS TO PENICILLIN: ICD-10-CM

## 2023-10-22 LAB
ALBUMIN SERPL ELPH-MCNC: 4.2 G/DL — SIGNIFICANT CHANGE UP (ref 3.3–5)
ALBUMIN SERPL ELPH-MCNC: 4.2 G/DL — SIGNIFICANT CHANGE UP (ref 3.3–5)
ALP SERPL-CCNC: 57 U/L — SIGNIFICANT CHANGE UP (ref 40–120)
ALP SERPL-CCNC: 57 U/L — SIGNIFICANT CHANGE UP (ref 40–120)
ALT FLD-CCNC: 29 U/L — SIGNIFICANT CHANGE UP (ref 12–78)
ALT FLD-CCNC: 29 U/L — SIGNIFICANT CHANGE UP (ref 12–78)
ANION GAP SERPL CALC-SCNC: 8 MMOL/L — SIGNIFICANT CHANGE UP (ref 5–17)
ANION GAP SERPL CALC-SCNC: 8 MMOL/L — SIGNIFICANT CHANGE UP (ref 5–17)
APPEARANCE UR: CLEAR — SIGNIFICANT CHANGE UP
APPEARANCE UR: CLEAR — SIGNIFICANT CHANGE UP
APTT BLD: 31.8 SEC — SIGNIFICANT CHANGE UP (ref 24.5–35.6)
APTT BLD: 31.8 SEC — SIGNIFICANT CHANGE UP (ref 24.5–35.6)
AST SERPL-CCNC: 9 U/L — LOW (ref 15–37)
AST SERPL-CCNC: 9 U/L — LOW (ref 15–37)
BASOPHILS # BLD AUTO: 0.06 K/UL — SIGNIFICANT CHANGE UP (ref 0–0.2)
BASOPHILS # BLD AUTO: 0.06 K/UL — SIGNIFICANT CHANGE UP (ref 0–0.2)
BASOPHILS NFR BLD AUTO: 0.8 % — SIGNIFICANT CHANGE UP (ref 0–2)
BASOPHILS NFR BLD AUTO: 0.8 % — SIGNIFICANT CHANGE UP (ref 0–2)
BILIRUB SERPL-MCNC: 0.6 MG/DL — SIGNIFICANT CHANGE UP (ref 0.2–1.2)
BILIRUB SERPL-MCNC: 0.6 MG/DL — SIGNIFICANT CHANGE UP (ref 0.2–1.2)
BILIRUB UR-MCNC: NEGATIVE — SIGNIFICANT CHANGE UP
BILIRUB UR-MCNC: NEGATIVE — SIGNIFICANT CHANGE UP
BUN SERPL-MCNC: 11 MG/DL — SIGNIFICANT CHANGE UP (ref 7–23)
BUN SERPL-MCNC: 11 MG/DL — SIGNIFICANT CHANGE UP (ref 7–23)
CALCIUM SERPL-MCNC: 9.1 MG/DL — SIGNIFICANT CHANGE UP (ref 8.5–10.1)
CALCIUM SERPL-MCNC: 9.1 MG/DL — SIGNIFICANT CHANGE UP (ref 8.5–10.1)
CHLORIDE SERPL-SCNC: 110 MMOL/L — HIGH (ref 96–108)
CHLORIDE SERPL-SCNC: 110 MMOL/L — HIGH (ref 96–108)
CO2 SERPL-SCNC: 21 MMOL/L — LOW (ref 22–31)
CO2 SERPL-SCNC: 21 MMOL/L — LOW (ref 22–31)
COLOR SPEC: YELLOW — SIGNIFICANT CHANGE UP
COLOR SPEC: YELLOW — SIGNIFICANT CHANGE UP
CREAT SERPL-MCNC: 0.95 MG/DL — SIGNIFICANT CHANGE UP (ref 0.5–1.3)
CREAT SERPL-MCNC: 0.95 MG/DL — SIGNIFICANT CHANGE UP (ref 0.5–1.3)
DIFF PNL FLD: NEGATIVE — SIGNIFICANT CHANGE UP
DIFF PNL FLD: NEGATIVE — SIGNIFICANT CHANGE UP
EGFR: 79 ML/MIN/1.73M2 — SIGNIFICANT CHANGE UP
EGFR: 79 ML/MIN/1.73M2 — SIGNIFICANT CHANGE UP
EOSINOPHIL # BLD AUTO: 0.3 K/UL — SIGNIFICANT CHANGE UP (ref 0–0.5)
EOSINOPHIL # BLD AUTO: 0.3 K/UL — SIGNIFICANT CHANGE UP (ref 0–0.5)
EOSINOPHIL NFR BLD AUTO: 3.8 % — SIGNIFICANT CHANGE UP (ref 0–6)
EOSINOPHIL NFR BLD AUTO: 3.8 % — SIGNIFICANT CHANGE UP (ref 0–6)
GLUCOSE SERPL-MCNC: 124 MG/DL — HIGH (ref 70–99)
GLUCOSE SERPL-MCNC: 124 MG/DL — HIGH (ref 70–99)
GLUCOSE UR QL: NEGATIVE MG/DL — SIGNIFICANT CHANGE UP
GLUCOSE UR QL: NEGATIVE MG/DL — SIGNIFICANT CHANGE UP
HCG UR QL: NEGATIVE — SIGNIFICANT CHANGE UP
HCG UR QL: NEGATIVE — SIGNIFICANT CHANGE UP
HCT VFR BLD CALC: 37.2 % — SIGNIFICANT CHANGE UP (ref 34.5–45)
HCT VFR BLD CALC: 37.2 % — SIGNIFICANT CHANGE UP (ref 34.5–45)
HGB BLD-MCNC: 13.3 G/DL — SIGNIFICANT CHANGE UP (ref 11.5–15.5)
HGB BLD-MCNC: 13.3 G/DL — SIGNIFICANT CHANGE UP (ref 11.5–15.5)
IMM GRANULOCYTES NFR BLD AUTO: 0.1 % — SIGNIFICANT CHANGE UP (ref 0–0.9)
IMM GRANULOCYTES NFR BLD AUTO: 0.1 % — SIGNIFICANT CHANGE UP (ref 0–0.9)
INR BLD: 1.01 RATIO — SIGNIFICANT CHANGE UP (ref 0.85–1.18)
INR BLD: 1.01 RATIO — SIGNIFICANT CHANGE UP (ref 0.85–1.18)
KETONES UR-MCNC: ABNORMAL MG/DL
KETONES UR-MCNC: ABNORMAL MG/DL
LEUKOCYTE ESTERASE UR-ACNC: NEGATIVE — SIGNIFICANT CHANGE UP
LEUKOCYTE ESTERASE UR-ACNC: NEGATIVE — SIGNIFICANT CHANGE UP
LYMPHOCYTES # BLD AUTO: 3.09 K/UL — SIGNIFICANT CHANGE UP (ref 1–3.3)
LYMPHOCYTES # BLD AUTO: 3.09 K/UL — SIGNIFICANT CHANGE UP (ref 1–3.3)
LYMPHOCYTES # BLD AUTO: 39.6 % — SIGNIFICANT CHANGE UP (ref 13–44)
LYMPHOCYTES # BLD AUTO: 39.6 % — SIGNIFICANT CHANGE UP (ref 13–44)
MCHC RBC-ENTMCNC: 30.9 PG — SIGNIFICANT CHANGE UP (ref 27–34)
MCHC RBC-ENTMCNC: 30.9 PG — SIGNIFICANT CHANGE UP (ref 27–34)
MCHC RBC-ENTMCNC: 35.8 GM/DL — SIGNIFICANT CHANGE UP (ref 32–36)
MCHC RBC-ENTMCNC: 35.8 GM/DL — SIGNIFICANT CHANGE UP (ref 32–36)
MCV RBC AUTO: 86.3 FL — SIGNIFICANT CHANGE UP (ref 80–100)
MCV RBC AUTO: 86.3 FL — SIGNIFICANT CHANGE UP (ref 80–100)
MONOCYTES # BLD AUTO: 0.51 K/UL — SIGNIFICANT CHANGE UP (ref 0–0.9)
MONOCYTES # BLD AUTO: 0.51 K/UL — SIGNIFICANT CHANGE UP (ref 0–0.9)
MONOCYTES NFR BLD AUTO: 6.5 % — SIGNIFICANT CHANGE UP (ref 2–14)
MONOCYTES NFR BLD AUTO: 6.5 % — SIGNIFICANT CHANGE UP (ref 2–14)
NEUTROPHILS # BLD AUTO: 3.83 K/UL — SIGNIFICANT CHANGE UP (ref 1.8–7.4)
NEUTROPHILS # BLD AUTO: 3.83 K/UL — SIGNIFICANT CHANGE UP (ref 1.8–7.4)
NEUTROPHILS NFR BLD AUTO: 49.2 % — SIGNIFICANT CHANGE UP (ref 43–77)
NEUTROPHILS NFR BLD AUTO: 49.2 % — SIGNIFICANT CHANGE UP (ref 43–77)
NITRITE UR-MCNC: NEGATIVE — SIGNIFICANT CHANGE UP
NITRITE UR-MCNC: NEGATIVE — SIGNIFICANT CHANGE UP
PH UR: 6.5 — SIGNIFICANT CHANGE UP (ref 5–8)
PH UR: 6.5 — SIGNIFICANT CHANGE UP (ref 5–8)
PLATELET # BLD AUTO: 260 K/UL — SIGNIFICANT CHANGE UP (ref 150–400)
PLATELET # BLD AUTO: 260 K/UL — SIGNIFICANT CHANGE UP (ref 150–400)
POTASSIUM SERPL-MCNC: 3.4 MMOL/L — LOW (ref 3.5–5.3)
POTASSIUM SERPL-MCNC: 3.4 MMOL/L — LOW (ref 3.5–5.3)
POTASSIUM SERPL-SCNC: 3.4 MMOL/L — LOW (ref 3.5–5.3)
POTASSIUM SERPL-SCNC: 3.4 MMOL/L — LOW (ref 3.5–5.3)
PROT SERPL-MCNC: 7.5 GM/DL — SIGNIFICANT CHANGE UP (ref 6–8.3)
PROT SERPL-MCNC: 7.5 GM/DL — SIGNIFICANT CHANGE UP (ref 6–8.3)
PROT UR-MCNC: NEGATIVE MG/DL — SIGNIFICANT CHANGE UP
PROT UR-MCNC: NEGATIVE MG/DL — SIGNIFICANT CHANGE UP
PROTHROM AB SERPL-ACNC: 11.4 SEC — SIGNIFICANT CHANGE UP (ref 9.5–13)
PROTHROM AB SERPL-ACNC: 11.4 SEC — SIGNIFICANT CHANGE UP (ref 9.5–13)
RAPID RVP RESULT: SIGNIFICANT CHANGE UP
RAPID RVP RESULT: SIGNIFICANT CHANGE UP
RBC # BLD: 4.31 M/UL — SIGNIFICANT CHANGE UP (ref 3.8–5.2)
RBC # BLD: 4.31 M/UL — SIGNIFICANT CHANGE UP (ref 3.8–5.2)
RBC # FLD: 11.4 % — SIGNIFICANT CHANGE UP (ref 10.3–14.5)
RBC # FLD: 11.4 % — SIGNIFICANT CHANGE UP (ref 10.3–14.5)
SARS-COV-2 RNA SPEC QL NAA+PROBE: SIGNIFICANT CHANGE UP
SARS-COV-2 RNA SPEC QL NAA+PROBE: SIGNIFICANT CHANGE UP
SODIUM SERPL-SCNC: 139 MMOL/L — SIGNIFICANT CHANGE UP (ref 135–145)
SODIUM SERPL-SCNC: 139 MMOL/L — SIGNIFICANT CHANGE UP (ref 135–145)
SP GR SPEC: 1.01 — SIGNIFICANT CHANGE UP (ref 1–1.03)
SP GR SPEC: 1.01 — SIGNIFICANT CHANGE UP (ref 1–1.03)
TROPONIN I, HIGH SENSITIVITY RESULT: 22.34 NG/L — SIGNIFICANT CHANGE UP
TROPONIN I, HIGH SENSITIVITY RESULT: 22.34 NG/L — SIGNIFICANT CHANGE UP
UROBILINOGEN FLD QL: 0.2 MG/DL — SIGNIFICANT CHANGE UP (ref 0.2–1)
UROBILINOGEN FLD QL: 0.2 MG/DL — SIGNIFICANT CHANGE UP (ref 0.2–1)
WBC # BLD: 7.8 K/UL — SIGNIFICANT CHANGE UP (ref 3.8–10.5)
WBC # BLD: 7.8 K/UL — SIGNIFICANT CHANGE UP (ref 3.8–10.5)
WBC # FLD AUTO: 7.8 K/UL — SIGNIFICANT CHANGE UP (ref 3.8–10.5)
WBC # FLD AUTO: 7.8 K/UL — SIGNIFICANT CHANGE UP (ref 3.8–10.5)

## 2023-10-22 PROCEDURE — 0042T: CPT | Mod: MA

## 2023-10-22 PROCEDURE — 70496 CT ANGIOGRAPHY HEAD: CPT | Mod: MA

## 2023-10-22 PROCEDURE — 0225U NFCT DS DNA&RNA 21 SARSCOV2: CPT

## 2023-10-22 PROCEDURE — 85610 PROTHROMBIN TIME: CPT

## 2023-10-22 PROCEDURE — 84484 ASSAY OF TROPONIN QUANT: CPT

## 2023-10-22 PROCEDURE — 70496 CT ANGIOGRAPHY HEAD: CPT | Mod: 26,MA

## 2023-10-22 PROCEDURE — 85730 THROMBOPLASTIN TIME PARTIAL: CPT

## 2023-10-22 PROCEDURE — 82962 GLUCOSE BLOOD TEST: CPT

## 2023-10-22 PROCEDURE — 81003 URINALYSIS AUTO W/O SCOPE: CPT

## 2023-10-22 PROCEDURE — 93005 ELECTROCARDIOGRAM TRACING: CPT

## 2023-10-22 PROCEDURE — 36415 COLL VENOUS BLD VENIPUNCTURE: CPT

## 2023-10-22 PROCEDURE — 81025 URINE PREGNANCY TEST: CPT

## 2023-10-22 PROCEDURE — 99285 EMERGENCY DEPT VISIT HI MDM: CPT

## 2023-10-22 PROCEDURE — 93010 ELECTROCARDIOGRAM REPORT: CPT

## 2023-10-22 PROCEDURE — 71045 X-RAY EXAM CHEST 1 VIEW: CPT | Mod: 26

## 2023-10-22 PROCEDURE — 80053 COMPREHEN METABOLIC PANEL: CPT

## 2023-10-22 PROCEDURE — 99285 EMERGENCY DEPT VISIT HI MDM: CPT | Mod: 25

## 2023-10-22 PROCEDURE — 71045 X-RAY EXAM CHEST 1 VIEW: CPT

## 2023-10-22 PROCEDURE — 70450 CT HEAD/BRAIN W/O DYE: CPT | Mod: MA

## 2023-10-22 PROCEDURE — 70498 CT ANGIOGRAPHY NECK: CPT | Mod: MA

## 2023-10-22 PROCEDURE — 70498 CT ANGIOGRAPHY NECK: CPT | Mod: 26,MA

## 2023-10-22 PROCEDURE — 70450 CT HEAD/BRAIN W/O DYE: CPT | Mod: 26,MA,59

## 2023-10-22 PROCEDURE — 85025 COMPLETE CBC W/AUTO DIFF WBC: CPT

## 2023-10-22 RX ORDER — ACETAMINOPHEN 500 MG
1000 TABLET ORAL ONCE
Refills: 0 | Status: COMPLETED | OUTPATIENT
Start: 2023-10-22 | End: 2023-10-22

## 2023-10-22 RX ADMIN — Medication 1000 MILLIGRAM(S): at 04:26

## 2023-10-22 NOTE — ED PROVIDER NOTE - PROGRESS NOTE DETAILS
Attending Richard, transfer center called and d/w Dr. Mckeon.  Given pt with similar symptoms last week and now with same symptoms pt is likely not TPA candidate.  Awaiting ct results. Attending Richard, radiology called and ct head negative Attending Richard, Patient updated on results for test.  Patient states that the numbness and tingling still improved.  Patient did say that she had MRI along with lumbar puncture when she was at the other hospital.  Patient has valacyclovir to take until the end of October.    Discussed with patient options, given symptoms improving and has neurology and primary care doctor follow-up, patient opted for discharge and will follow up with team and will return if symptoms worsen.

## 2023-10-22 NOTE — ED ADULT NURSE NOTE - CHIEF COMPLAINT QUOTE
Pt came into the ED with c/o right sided arm numbness. Pt reports the numbness began one hour ago. MD ESTRADA evaluated for code stroke, called at 03:14. Pt dx with viral meningitis on Thursday at [facility]. Pt has allergy to penicillin.  in triage, pt sent directly to CT.

## 2023-10-22 NOTE — ED PROVIDER NOTE - CLINICAL SUMMARY MEDICAL DECISION MAKING FREE TEXT BOX
38-year-old patient presents emerged department right arm weakness numbness.  NIH = 3   patient is not a tPA candidate given more than 3 hours of symptoms.      Plan check labs, CT scan, tele neurology consult

## 2023-10-22 NOTE — ED PROVIDER NOTE - NSFOLLOWUPINSTRUCTIONS_ED_ALL_ED_FT
Please call and follow-up with your primary care doctor and neurologist in the next 2 to 4 days.    Use Tylenol (acetaminophen) 1000mg every 6 hours and Motrin (Ibuprofen) 600 mg every 6 hours as need for pain.    Return to the Emergency Department for worsening or persistent symptoms, and/or ANY NEW OR CONCERNING SYMPTOMS. If you have issues obtaining follow up, please call: [phone] or [phone]  to obtain a doctor or specialist who takes your insurance in your area.    Viral Meningitis    WHAT YOU NEED TO KNOW:    Viral meningitis is inflammation of the lining that surrounds and protects your brain and spinal cord. Viral meningitis is also called aseptic meningitis.    DISCHARGE INSTRUCTIONS:    Call your local emergency number (191 in the [place]) or have someone call if:    You are hard to wake.    You have a seizure.  Seek care immediately if:    Your symptoms get worse.    You are confused.    You have a new red or purple rash.  Call your doctor if:    You have a fever.    You think someone in your family has viral meningitis.    You have questions or concerns about your condition or care.  Medicines: You may need any of the following:    Acetaminophen decreases pain and fever. It is available without a doctor's order. Ask how much to take and how often to take it. Follow directions. Read the labels of all other medicines you are using to see if they also contain acetaminophen, or ask your doctor or pharmacist. Acetaminophen can cause liver damage if not taken correctly.    Prescription pain medicine may be given. Ask your healthcare provider how to take this medicine safely. Some prescription pain medicines contain acetaminophen. Do not take other medicines that contain acetaminophen without talking to your healthcare provider. Too much acetaminophen may cause liver damage. Prescription pain medicine may cause constipation. Ask your healthcare provider how to prevent or treat constipation.    Nausea medicine helps calm your stomach and control vomiting.    Antiviral medicine helps treat an infection caused by a virus.    Take your medicine as directed. Contact your healthcare provider if you think your medicine is not helping or if you have side effects. Tell your provider if you are allergic to any medicine. Keep a list of the medicines, vitamins, and herbs you take. Include the amounts, and when and why you take them. Bring the list or the pill bottles to follow-up visits. Carry your medicine list with you in case of an emergency.  Manage your symptoms:    Rest as much as possible. A dark, quiet room is best if you have headaches or your eyes are sensitive to light.    Drink liquids as directed. You may need extra liquids to help prevent dehydration. Ask how much liquid to drink each day and which liquids are best for you.  Prevent the spread of viral meningitis:      Wash your hands often. Wash your hands several times each day. Wash after you use the bathroom, change a child's diaper, and before you prepare or eat food. Use soap and water every time. Rub your soapy hands together, lacing your fingers. Wash the front and back of your hands, and in between your fingers. Use the fingers of one hand to scrub under the fingernails of the other hand. Wash for at least 20 seconds. Rinse with warm, running water for several seconds. Then dry your hands with a clean towel or paper towel. Use hand  that contains alcohol if soap and water are not available. Do not touch your eyes, nose, or mouth without washing your hands first.  Handwashing      Cover a sneeze or cough. Use a tissue that covers your mouth and nose. Throw the tissue away in a trash can right away. Use the bend of your arm if a tissue is not available. Wash your hands well with soap and water or use a hand .    Do not share items with anyone. This includes food and drinks.    Get vaccines as directed. Vaccines help protect you and others around you from diseases caused by viruses or bacteria. Get the influenza (flu) vaccine as soon as recommended each year. The flu vaccine is usually available starting in September or October. Flu viruses change, so it is important to get a flu vaccine every year. Get the pneumonia vaccine if recommended. This vaccine is usually recommended every 5 years. Your provider will tell you when to get this vaccine, if needed. He or she can tell you if you should get other vaccines, and when to get them.  Follow up with your doctor as directed: Write down your questions so you remember to ask them during your visits.

## 2023-10-22 NOTE — ED ADULT NURSE NOTE - NSFALLUNIVINTERV_ED_ALL_ED
Bed/Stretcher in lowest position, wheels locked, appropriate side rails in place/Call bell, personal items and telephone in reach/Instruct patient to call for assistance before getting out of bed/chair/stretcher/Non-slip footwear applied when patient is off stretcher/[place] to call system/Physically safe environment - no spills, clutter or unnecessary equipment/Purposeful proactive rounding/Room/bathroom lighting operational, light cord in reach

## 2023-10-22 NOTE — ED PROVIDER NOTE - OBJECTIVE STATEMENT
30-year-old patient with history of hemochromatosis and hypothyroid presents emergency department with fiancé for right-sided numbness/weakness.  Patient states on October 16 she had similar episode and presented to [facility].  At that time she was found to have viral meningitis and was discharged with valacyclovir.  patient has been home since October 19.  Patient went to sleep around midnight tonight and then woke up with numbness and tingling on the right arm radiation up to the face and weakness on the right side.  Patient states that she was unsteady while she was walking.  Denies any fevers, no traveling, no head injury.  No nausea/vomiting/diarrhea

## 2023-10-22 NOTE — ED ADULT TRIAGE NOTE - CHIEF COMPLAINT QUOTE
Pt came into the ED with c/o right sided arm numbness. Pt reports the numbness began one hour ago. MD ESTRADA evaluated for code stroke, called at 03:14. Pt dx with viral meningitis on Thursday at [facility]. Pt has allergy to penicillin. Pt came into the ED with c/o right sided arm numbness. Pt reports the numbness began one hour ago. MD ESTRADA evaluated for code stroke, called at 03:14. Pt dx with viral meningitis on Thursday at [facility]. Pt has allergy to penicillin.  in triage, pt sent directly to CT.

## 2023-10-22 NOTE — ED ADULT NURSE NOTE - OBJECTIVE STATEMENT
38y female presents to the ED A/OX4, c/o of right sided arm numbness. Pt reports onset of symptoms was sleep and woke up around 2:00 - 2:15 am feeling numbness of the right arm with tingling. Pt reports having similar symptoms on Monday went to the hospital and was diagnosed with Viral meningitis, was d/c on Thursday, completed treatment. Pt now still endorses numbness of the right arm, neck pain. Denies vision changes, nausea or vomiting.
normal...

## 2023-10-22 NOTE — ED PROVIDER NOTE - PATIENT PORTAL LINK FT
You can access the FollowMyHealth Patient Portal offered by [facility] by registering at the following website: [email or web address]. By joining [employer]’s FollowMyHealth portal, you will also be able to view your health information using other applications (apps) compatible with our system.

## 2023-10-23 ENCOUNTER — APPOINTMENT (OUTPATIENT)
Dept: INTERNAL MEDICINE | Facility: CLINIC | Age: 38
End: 2023-10-23
Payer: COMMERCIAL

## 2023-10-23 VITALS
DIASTOLIC BLOOD PRESSURE: 76 MMHG | TEMPERATURE: 97.8 F | HEART RATE: 77 BPM | WEIGHT: 150 LBS | RESPIRATION RATE: 14 BRPM | SYSTOLIC BLOOD PRESSURE: 112 MMHG | OXYGEN SATURATION: 98 % | HEIGHT: 62 IN | BODY MASS INDEX: 27.6 KG/M2

## 2023-10-23 DIAGNOSIS — E87.6 HYPOKALEMIA: ICD-10-CM

## 2023-10-23 PROBLEM — E83.119 HEMOCHROMATOSIS, UNSPECIFIED: Chronic | Status: ACTIVE | Noted: 2023-10-22

## 2023-10-23 PROCEDURE — 99214 OFFICE O/P EST MOD 30 MIN: CPT

## 2023-10-24 ENCOUNTER — NON-APPOINTMENT (OUTPATIENT)
Age: 38
End: 2023-10-24

## 2023-10-24 ENCOUNTER — INPATIENT (INPATIENT)
Facility: HOSPITAL | Age: 38
LOS: 2 days | Discharge: HOME | DRG: 103 | End: 2023-10-27
Attending: HOSPITALIST | Admitting: INTERNAL MEDICINE
Payer: COMMERCIAL

## 2023-10-24 VITALS — HEIGHT: 66 IN | WEIGHT: 164.24 LBS

## 2023-10-24 DIAGNOSIS — R53.1 WEAKNESS: ICD-10-CM

## 2023-10-24 LAB
ALBUMIN SERPL ELPH-MCNC: 4 G/DL — SIGNIFICANT CHANGE UP (ref 3.3–5)
ALBUMIN SERPL ELPH-MCNC: 4 G/DL — SIGNIFICANT CHANGE UP (ref 3.3–5)
ALBUMIN SERPL ELPH-MCNC: 4.8 G/DL
ALP BLD-CCNC: 69 U/L
ALP SERPL-CCNC: 62 U/L — SIGNIFICANT CHANGE UP (ref 40–120)
ALP SERPL-CCNC: 62 U/L — SIGNIFICANT CHANGE UP (ref 40–120)
ALT FLD-CCNC: 23 U/L — SIGNIFICANT CHANGE UP (ref 12–78)
ALT FLD-CCNC: 23 U/L — SIGNIFICANT CHANGE UP (ref 12–78)
ALT SERPL-CCNC: 13 U/L
ANION GAP SERPL CALC-SCNC: 11 MMOL/L
ANION GAP SERPL CALC-SCNC: 7 MMOL/L — SIGNIFICANT CHANGE UP (ref 5–17)
ANION GAP SERPL CALC-SCNC: 7 MMOL/L — SIGNIFICANT CHANGE UP (ref 5–17)
APTT BLD: 30.6 SEC — SIGNIFICANT CHANGE UP (ref 24.5–35.6)
APTT BLD: 30.6 SEC — SIGNIFICANT CHANGE UP (ref 24.5–35.6)
AST SERPL-CCNC: 10 U/L
AST SERPL-CCNC: 7 U/L — LOW (ref 15–37)
AST SERPL-CCNC: 7 U/L — LOW (ref 15–37)
BASOPHILS # BLD AUTO: 0.04 K/UL — SIGNIFICANT CHANGE UP (ref 0–0.2)
BASOPHILS # BLD AUTO: 0.04 K/UL — SIGNIFICANT CHANGE UP (ref 0–0.2)
BASOPHILS NFR BLD AUTO: 0.5 % — SIGNIFICANT CHANGE UP (ref 0–2)
BASOPHILS NFR BLD AUTO: 0.5 % — SIGNIFICANT CHANGE UP (ref 0–2)
BILIRUB SERPL-MCNC: 0.4 MG/DL
BILIRUB SERPL-MCNC: 0.5 MG/DL — SIGNIFICANT CHANGE UP (ref 0.2–1.2)
BILIRUB SERPL-MCNC: 0.5 MG/DL — SIGNIFICANT CHANGE UP (ref 0.2–1.2)
BUN SERPL-MCNC: 12 MG/DL
BUN SERPL-MCNC: 9 MG/DL — SIGNIFICANT CHANGE UP (ref 7–23)
BUN SERPL-MCNC: 9 MG/DL — SIGNIFICANT CHANGE UP (ref 7–23)
CALCIUM SERPL-MCNC: 8.9 MG/DL — SIGNIFICANT CHANGE UP (ref 8.5–10.1)
CALCIUM SERPL-MCNC: 8.9 MG/DL — SIGNIFICANT CHANGE UP (ref 8.5–10.1)
CALCIUM SERPL-MCNC: 9.6 MG/DL
CHLORIDE SERPL-SCNC: 104 MMOL/L
CHLORIDE SERPL-SCNC: 111 MMOL/L — HIGH (ref 96–108)
CHLORIDE SERPL-SCNC: 111 MMOL/L — HIGH (ref 96–108)
CO2 SERPL-SCNC: 23 MMOL/L — SIGNIFICANT CHANGE UP (ref 22–31)
CO2 SERPL-SCNC: 23 MMOL/L — SIGNIFICANT CHANGE UP (ref 22–31)
CO2 SERPL-SCNC: 25 MMOL/L
CREAT SERPL-MCNC: 0.95 MG/DL — SIGNIFICANT CHANGE UP (ref 0.5–1.3)
CREAT SERPL-MCNC: 0.95 MG/DL — SIGNIFICANT CHANGE UP (ref 0.5–1.3)
CREAT SERPL-MCNC: 0.97 MG/DL
EGFR: 77 ML/MIN/1.73M2
EGFR: 79 ML/MIN/1.73M2 — SIGNIFICANT CHANGE UP
EGFR: 79 ML/MIN/1.73M2 — SIGNIFICANT CHANGE UP
EOSINOPHIL # BLD AUTO: 0.12 K/UL — SIGNIFICANT CHANGE UP (ref 0–0.5)
EOSINOPHIL # BLD AUTO: 0.12 K/UL — SIGNIFICANT CHANGE UP (ref 0–0.5)
EOSINOPHIL NFR BLD AUTO: 1.4 % — SIGNIFICANT CHANGE UP (ref 0–6)
EOSINOPHIL NFR BLD AUTO: 1.4 % — SIGNIFICANT CHANGE UP (ref 0–6)
GLUCOSE SERPL-MCNC: 102 MG/DL
GLUCOSE SERPL-MCNC: 116 MG/DL — HIGH (ref 70–99)
GLUCOSE SERPL-MCNC: 116 MG/DL — HIGH (ref 70–99)
HCG SERPL-ACNC: <1 MIU/ML — SIGNIFICANT CHANGE UP
HCG SERPL-ACNC: <1 MIU/ML — SIGNIFICANT CHANGE UP
HCT VFR BLD CALC: 38 % — SIGNIFICANT CHANGE UP (ref 34.5–45)
HCT VFR BLD CALC: 38 % — SIGNIFICANT CHANGE UP (ref 34.5–45)
HCT VFR BLD CALC: 42.2 %
HGB BLD-MCNC: 13.4 G/DL — SIGNIFICANT CHANGE UP (ref 11.5–15.5)
HGB BLD-MCNC: 13.4 G/DL — SIGNIFICANT CHANGE UP (ref 11.5–15.5)
HGB BLD-MCNC: 14 G/DL
IMM GRANULOCYTES NFR BLD AUTO: 0.2 % — SIGNIFICANT CHANGE UP (ref 0–0.9)
IMM GRANULOCYTES NFR BLD AUTO: 0.2 % — SIGNIFICANT CHANGE UP (ref 0–0.9)
INR BLD: 0.99 RATIO — SIGNIFICANT CHANGE UP (ref 0.85–1.18)
INR BLD: 0.99 RATIO — SIGNIFICANT CHANGE UP (ref 0.85–1.18)
LYMPHOCYTES # BLD AUTO: 3.42 K/UL — HIGH (ref 1–3.3)
LYMPHOCYTES # BLD AUTO: 3.42 K/UL — HIGH (ref 1–3.3)
LYMPHOCYTES # BLD AUTO: 39.7 % — SIGNIFICANT CHANGE UP (ref 13–44)
LYMPHOCYTES # BLD AUTO: 39.7 % — SIGNIFICANT CHANGE UP (ref 13–44)
MAGNESIUM SERPL-MCNC: 2.4 MG/DL
MCHC RBC-ENTMCNC: 30.5 PG
MCHC RBC-ENTMCNC: 31 PG — SIGNIFICANT CHANGE UP (ref 27–34)
MCHC RBC-ENTMCNC: 31 PG — SIGNIFICANT CHANGE UP (ref 27–34)
MCHC RBC-ENTMCNC: 33.2 GM/DL
MCHC RBC-ENTMCNC: 35.3 GM/DL — SIGNIFICANT CHANGE UP (ref 32–36)
MCHC RBC-ENTMCNC: 35.3 GM/DL — SIGNIFICANT CHANGE UP (ref 32–36)
MCV RBC AUTO: 88 FL — SIGNIFICANT CHANGE UP (ref 80–100)
MCV RBC AUTO: 88 FL — SIGNIFICANT CHANGE UP (ref 80–100)
MCV RBC AUTO: 91.9 FL
MONOCYTES # BLD AUTO: 0.51 K/UL — SIGNIFICANT CHANGE UP (ref 0–0.9)
MONOCYTES # BLD AUTO: 0.51 K/UL — SIGNIFICANT CHANGE UP (ref 0–0.9)
MONOCYTES NFR BLD AUTO: 5.9 % — SIGNIFICANT CHANGE UP (ref 2–14)
MONOCYTES NFR BLD AUTO: 5.9 % — SIGNIFICANT CHANGE UP (ref 2–14)
NEUTROPHILS # BLD AUTO: 4.5 K/UL — SIGNIFICANT CHANGE UP (ref 1.8–7.4)
NEUTROPHILS # BLD AUTO: 4.5 K/UL — SIGNIFICANT CHANGE UP (ref 1.8–7.4)
NEUTROPHILS NFR BLD AUTO: 52.3 % — SIGNIFICANT CHANGE UP (ref 43–77)
NEUTROPHILS NFR BLD AUTO: 52.3 % — SIGNIFICANT CHANGE UP (ref 43–77)
PHOSPHATE SERPL-MCNC: 3.6 MG/DL
PLATELET # BLD AUTO: 314 K/UL — SIGNIFICANT CHANGE UP (ref 150–400)
PLATELET # BLD AUTO: 314 K/UL — SIGNIFICANT CHANGE UP (ref 150–400)
PLATELET # BLD AUTO: 338 K/UL
POTASSIUM SERPL-MCNC: 3.7 MMOL/L — SIGNIFICANT CHANGE UP (ref 3.5–5.3)
POTASSIUM SERPL-MCNC: 3.7 MMOL/L — SIGNIFICANT CHANGE UP (ref 3.5–5.3)
POTASSIUM SERPL-SCNC: 3.7 MMOL/L — SIGNIFICANT CHANGE UP (ref 3.5–5.3)
POTASSIUM SERPL-SCNC: 3.7 MMOL/L — SIGNIFICANT CHANGE UP (ref 3.5–5.3)
POTASSIUM SERPL-SCNC: 4.7 MMOL/L
PROT SERPL-MCNC: 7.4 G/DL
PROT SERPL-MCNC: 7.9 GM/DL — SIGNIFICANT CHANGE UP (ref 6–8.3)
PROT SERPL-MCNC: 7.9 GM/DL — SIGNIFICANT CHANGE UP (ref 6–8.3)
PROTHROM AB SERPL-ACNC: 11.2 SEC — SIGNIFICANT CHANGE UP (ref 9.5–13)
PROTHROM AB SERPL-ACNC: 11.2 SEC — SIGNIFICANT CHANGE UP (ref 9.5–13)
RBC # BLD: 4.32 M/UL — SIGNIFICANT CHANGE UP (ref 3.8–5.2)
RBC # BLD: 4.32 M/UL — SIGNIFICANT CHANGE UP (ref 3.8–5.2)
RBC # BLD: 4.59 M/UL
RBC # FLD: 11.9 %
RBC # FLD: 11.9 % — SIGNIFICANT CHANGE UP (ref 10.3–14.5)
RBC # FLD: 11.9 % — SIGNIFICANT CHANGE UP (ref 10.3–14.5)
SODIUM SERPL-SCNC: 139 MMOL/L
SODIUM SERPL-SCNC: 141 MMOL/L — SIGNIFICANT CHANGE UP (ref 135–145)
SODIUM SERPL-SCNC: 141 MMOL/L — SIGNIFICANT CHANGE UP (ref 135–145)
TROPONIN I, HIGH SENSITIVITY RESULT: 20.19 NG/L — SIGNIFICANT CHANGE UP
TROPONIN I, HIGH SENSITIVITY RESULT: 20.19 NG/L — SIGNIFICANT CHANGE UP
WBC # BLD: 8.61 K/UL — SIGNIFICANT CHANGE UP (ref 3.8–10.5)
WBC # BLD: 8.61 K/UL — SIGNIFICANT CHANGE UP (ref 3.8–10.5)
WBC # FLD AUTO: 7.88 K/UL
WBC # FLD AUTO: 8.61 K/UL — SIGNIFICANT CHANGE UP (ref 3.8–10.5)
WBC # FLD AUTO: 8.61 K/UL — SIGNIFICANT CHANGE UP (ref 3.8–10.5)

## 2023-10-24 PROCEDURE — 92523 SPEECH SOUND LANG COMPREHEN: CPT | Mod: GN

## 2023-10-24 PROCEDURE — 97162 PT EVAL MOD COMPLEX 30 MIN: CPT | Mod: GP

## 2023-10-24 PROCEDURE — 86140 C-REACTIVE PROTEIN: CPT

## 2023-10-24 PROCEDURE — A9579: CPT

## 2023-10-24 PROCEDURE — 82164 ANGIOTENSIN I ENZYME TEST: CPT

## 2023-10-24 PROCEDURE — 83036 HEMOGLOBIN GLYCOSYLATED A1C: CPT

## 2023-10-24 PROCEDURE — 0042T: CPT | Mod: MA

## 2023-10-24 PROCEDURE — 86225 DNA ANTIBODY NATIVE: CPT

## 2023-10-24 PROCEDURE — 70553 MRI BRAIN STEM W/O & W/DYE: CPT

## 2023-10-24 PROCEDURE — 99223 1ST HOSP IP/OBS HIGH 75: CPT

## 2023-10-24 PROCEDURE — 99291 CRITICAL CARE FIRST HOUR: CPT

## 2023-10-24 PROCEDURE — 85652 RBC SED RATE AUTOMATED: CPT

## 2023-10-24 PROCEDURE — 70498 CT ANGIOGRAPHY NECK: CPT | Mod: 26,MA

## 2023-10-24 PROCEDURE — 70450 CT HEAD/BRAIN W/O DYE: CPT | Mod: 26,MA,59

## 2023-10-24 PROCEDURE — 97116 GAIT TRAINING THERAPY: CPT | Mod: GP

## 2023-10-24 PROCEDURE — 86036 ANCA SCREEN EACH ANTIBODY: CPT

## 2023-10-24 PROCEDURE — 86431 RHEUMATOID FACTOR QUANT: CPT

## 2023-10-24 PROCEDURE — 86160 COMPLEMENT ANTIGEN: CPT

## 2023-10-24 PROCEDURE — 80061 LIPID PANEL: CPT

## 2023-10-24 PROCEDURE — G0378: CPT

## 2023-10-24 PROCEDURE — 86038 ANTINUCLEAR ANTIBODIES: CPT

## 2023-10-24 PROCEDURE — 80048 BASIC METABOLIC PNL TOTAL CA: CPT

## 2023-10-24 PROCEDURE — 92610 EVALUATE SWALLOWING FUNCTION: CPT | Mod: GN

## 2023-10-24 PROCEDURE — 36415 COLL VENOUS BLD VENIPUNCTURE: CPT

## 2023-10-24 PROCEDURE — 70496 CT ANGIOGRAPHY HEAD: CPT | Mod: 26,MA

## 2023-10-24 PROCEDURE — 95819 EEG AWAKE AND ASLEEP: CPT

## 2023-10-24 RX ORDER — CETIRIZINE HYDROCHLORIDE 10 MG/1
0 TABLET ORAL
Qty: 0 | Refills: 0 | DISCHARGE

## 2023-10-24 RX ORDER — ATORVASTATIN CALCIUM 80 MG/1
40 TABLET, FILM COATED ORAL AT BEDTIME
Refills: 0 | Status: DISCONTINUED | OUTPATIENT
Start: 2023-10-24 | End: 2023-10-27

## 2023-10-24 RX ORDER — INFLUENZA VIRUS VACCINE 15; 15; 15; 15 UG/.5ML; UG/.5ML; UG/.5ML; UG/.5ML
0.5 SUSPENSION INTRAMUSCULAR ONCE
Refills: 0 | Status: COMPLETED | OUTPATIENT
Start: 2023-10-24 | End: 2023-10-24

## 2023-10-24 RX ORDER — ASPIRIN/CALCIUM CARB/MAGNESIUM 324 MG
324 TABLET ORAL ONCE
Refills: 0 | Status: COMPLETED | OUTPATIENT
Start: 2023-10-24 | End: 2023-10-24

## 2023-10-24 RX ORDER — ONDANSETRON 8 MG/1
4 TABLET, FILM COATED ORAL EVERY 6 HOURS
Refills: 0 | Status: DISCONTINUED | OUTPATIENT
Start: 2023-10-24 | End: 2023-10-24

## 2023-10-24 RX ORDER — ONDANSETRON 8 MG/1
4 TABLET, FILM COATED ORAL EVERY 6 HOURS
Refills: 0 | Status: DISCONTINUED | OUTPATIENT
Start: 2023-10-24 | End: 2023-10-27

## 2023-10-24 RX ORDER — ACETAMINOPHEN 500 MG
650 TABLET ORAL EVERY 6 HOURS
Refills: 0 | Status: DISCONTINUED | OUTPATIENT
Start: 2023-10-24 | End: 2023-10-27

## 2023-10-24 RX ORDER — ONDANSETRON 8 MG/1
4 TABLET, FILM COATED ORAL ONCE
Refills: 0 | Status: COMPLETED | OUTPATIENT
Start: 2023-10-24 | End: 2023-10-24

## 2023-10-24 RX ORDER — VALACYCLOVIR 500 MG/1
1000 TABLET, FILM COATED ORAL THREE TIMES A DAY
Refills: 0 | Status: DISCONTINUED | OUTPATIENT
Start: 2023-10-24 | End: 2023-10-27

## 2023-10-24 RX ORDER — LEVOTHYROXINE SODIUM 125 MCG
25 TABLET ORAL DAILY
Refills: 0 | Status: DISCONTINUED | OUTPATIENT
Start: 2023-10-24 | End: 2023-10-27

## 2023-10-24 RX ORDER — ASPIRIN/CALCIUM CARB/MAGNESIUM 324 MG
81 TABLET ORAL DAILY
Refills: 0 | Status: DISCONTINUED | OUTPATIENT
Start: 2023-10-24 | End: 2023-10-25

## 2023-10-24 RX ADMIN — VALACYCLOVIR 1000 MILLIGRAM(S): 500 TABLET, FILM COATED ORAL at 20:26

## 2023-10-24 RX ADMIN — Medication 650 MILLIGRAM(S): at 17:38

## 2023-10-24 RX ADMIN — Medication 324 MILLIGRAM(S): at 12:54

## 2023-10-24 NOTE — ED ADULT NURSE NOTE - NEURO BEHAVIOR
FMLA form received from [employer] -- re:  Patient's LINDA d/t surgery scheduled Friday, May 19, 2017 with Dr. Pastor  Form completed; signed by ; fax returned per verbal authorization from patient  Confirmation receipt received   calm

## 2023-10-24 NOTE — SWALLOW BEDSIDE ASSESSMENT ADULT - SLP GENERAL OBSERVATIONS
The pt is alert and interactive. Her receptive language abilities were preserved and she was able to verbalize during communicative probes without evidence of a primary motor speech or primary linguistic pathology. The pt is communicatively competent and at speech-language baseline.

## 2023-10-24 NOTE — ED ADULT NURSE NOTE - NSFALLHARMRISKINTERV_ED_ALL_ED
Assistance OOB with selected safe patient handling equipment if applicable/Assistance with ambulation/Communicate risk of Fall with Harm to all staff, patient, and family/Encourage patient to sit up slowly, dangle for a short time, stand at bedside before walking/Monitor gait and stability/Orthostatic vital signs/Provide visual cue: red socks, yellow wristband, yellow gown, etc/Reinforce activity limits and safety measures with patient and family/Bed in lowest position, wheels locked, appropriate side rails in place/Call bell, personal items and telephone in reach/Instruct patient to call for assistance before getting out of bed/chair/stretcher/Non-slip footwear applied when patient is off stretcher/[place] to call system/Physically safe environment - no spills, clutter or unnecessary equipment/Purposeful Proactive Rounding/Room/bathroom lighting operational, light cord in reach

## 2023-10-24 NOTE — ED PROVIDER NOTE - NSICDXPASTMEDICALHX_GEN_ALL_CORE_FT
PAST MEDICAL HISTORY:  Hemochromatosis     No pertinent past medical history      PAST MEDICAL HISTORY:  Hemochromatosis

## 2023-10-24 NOTE — SWALLOW BEDSIDE ASSESSMENT ADULT - NS SPL SWALLOW CLINIC TRIAL FT
The pt exhibited functional Oropharyngeal Swallowing integrity for age. Bolus formation/transfer were mechanically functional for age and laryngeal lift on palpation during swallowing trials was felt to be functional for age as well. NO behavioral aspiration signs exhibited. No change in O2 sats noted. Odynophagia was denied.

## 2023-10-24 NOTE — ED PROVIDER NOTE - PROGRESS NOTE DETAILS
CT is without any acute findings.  Labs non-actionable.  Patient seen by neurology, recommending MRI with and without and admission for further monitoring.  Patient given full dose aspirin at their request.  Patient endorsed to hospitalist for admission on telemetry.

## 2023-10-24 NOTE — SWALLOW BEDSIDE ASSESSMENT ADULT - SWALLOW EVAL: DIAGNOSIS
1) The pt exhibits functional Oropharyngeal Swallowing integrity for age. Bolus formation/transfer were mechanically functional for age and laryngeal lift on palpation during swallowing trials was felt to be functional for age as well. NO behavioral aspiration signs exhibited. No change in O2 sats noted. Odynophagia was denied.

## 2023-10-24 NOTE — H&P ADULT - NSHPLABSRESULTS_GEN_ALL_CORE
13.4   8.61  )-----------( 314      ( 24 Oct 2023 11:56 )             38.0     10-24    141  |  111<H>  |  9   ----------------------------<  116<H>  3.7   |  23  |  0.95    Ca    8.9      24 Oct 2023 11:56    TPro  7.9  /  Alb  4.0  /  TBili  0.5  /  DBili  x   /  AST  7<L>  /  ALT  23  /  AlkPhos  62  10-24    LIVER FUNCTIONS - ( 24 Oct 2023 11:56 )  Alb: 4.0 g/dL / Pro: 7.9 gm/dL / ALK PHOS: 62 U/L / ALT: 23 U/L / AST: 7 U/L / GGT: x           PT/INR - ( 24 Oct 2023 11:56 )   PT: 11.2 sec;   INR: 0.99 ratio       PTT - ( 24 Oct 2023 11:56 )  PTT:30.6 sec  Urinalysis Basic - ( 24 Oct 2023 11:56 )    Color: x / Appearance: x / SG: x / pH: x  Gluc: 116 mg/dL / Ketone: x  / Bili: x / Urobili: x   Blood: x / Protein: x / Nitrite: x   Leuk Esterase: x / RBC: x / WBC x   Sq Epi: x / Non Sq Epi: x / Bacteria: x    EKG - NSR, normal axis, no acute ischemic changes    CTA HEAD:  IMPRESSION:  HEAD CT: No evidence of an acute intracranial hemorhage, midline shift or   hydrocephalus.  NECK CTA: No hemodynamic significant narowing within the neck.  BRAIN CTA: No proximal large vessel occlusion.  CT PERFUSION: No areas of ischemia identified

## 2023-10-24 NOTE — ED PROVIDER NOTE - PHYSICAL EXAMINATION
GENERAL: A&Ox4, non-toxic appearing, no acute distress  HEENT: NCAT, EOMI, oral mucosa moist, normal conjunctiva  RESP: CTAB, no respiratory distress, no wheezes/rhonchi/rales, speaking in full sentences  CV: RRR, no murmurs/rubs/gallops  ABDOMEN: soft, non-tender, non-distended, no guarding, no CVA tenderness  MSK: no visible deformities  NEURO: CN 2-12 grossly intact, no facial droop, no slurred speech, normal FNF, normal heel-shin, SILT, no dysdiadochokinesia, PERRLA, no nystagmus, left pronator drift, 3/5 left  strength, 3/5 strength LUE and LLE   SKIN: warm, normal color, well perfused, no rash  PSYCH: normal affect GENERAL: A&Ox4, non-toxic appearing, no acute distress  HEENT: NCAT, EOMI, oral mucosa moist, normal conjunctiva  RESP: CTAB, no respiratory distress, no wheezes/rhonchi/rales, speaking in full sentences  CV: RRR, no murmurs/rubs/gallops  ABDOMEN: soft, non-tender, non-distended, no guarding, no CVA tenderness  MSK: no visible deformities, midline spinal ttp from lower cervical to upper lumbar   NEURO: CN 2-12 grossly intact, no facial droop, no slurred speech, normal FNF, normal heel-shin, SILT, no dysdiadochokinesia, PERRLA, no nystagmus, left pronator drift, 3/5 left  strength, 3/5 strength LUE and LLE   SKIN: warm, normal color, well perfused, no rash  PSYCH: normal affect GENERAL: A&Ox4, non-toxic appearing, no acute distress  HEENT: NCAT, EOMI, oral mucosa moist, normal conjunctiva  RESP: CTAB, no respiratory distress, no wheezes/rhonchi/rales, speaking in full sentences  CV: RRR, no murmurs/rubs/gallops  ABDOMEN: soft, non-tender, non-distended, no guarding, no CVA tenderness  MSK: no visible deformities, midline spinal ttp from lower cervical to upper lumbar   NEURO: CN 2-12 grossly intact, no facial droop, no slurred speech, SILT, PERRLA, no nystagmus, left pronator drift, 3/5 left  strength, 3/5 strength LUE and LLE   SKIN: warm, normal color, well perfused, no rash  PSYCH: normal affect

## 2023-10-24 NOTE — H&P ADULT - NSHPREVIEWOFSYSTEMS_GEN_ALL_CORE
All other systems reviewed and found to be negative with the exception of what has been described above.

## 2023-10-24 NOTE — SWALLOW BEDSIDE ASSESSMENT ADULT - COMMENTS
The pt was admitted to  with acute sharp back pain, and c/o left sided weakness. Pt also c/o feeling warm and is flushed. Work up in progress. This profile is superimposed upon a recent ED visit at this hospital on 10/22 with transient right paresthesia that resolved. CTH at the time was negative. Additionally, the patient was also hospitalized at [facility] on 10/16 with viral meningitis. Other selected prior medical history is remarkable for hemochromatosis, Hashimoto's Thyroiditis, hypothyroidism, past appy and prior rotator cuff surgery,

## 2023-10-24 NOTE — SWALLOW BEDSIDE ASSESSMENT ADULT - SWALLOW EVAL: PROGNOSIS
2) The pt is alert and interactive. Her receptive language abilities were preserved and she was able to verbalize during communicative probes without evidence of a primary motor speech or primary linguistic pathology. The pt is communicatively competent and at speech-language baseline.

## 2023-10-24 NOTE — ED PROVIDER NOTE - OBJECTIVE STATEMENT
37 yo female w/PMHx hemochromatosis presents to the ED with onset of left sided weakness and numbness/tingling. Pt had a ct scan, MRI, and blood work done with no acute findings. Pt with recent LP which showed recent diagnosis of viral meningitis. Pt on Valacyclovir. Pt states that last night at around 1 am she had a sudden onset of left sided weakness and numbness. Pt states she is unable to move her left side to her full ability. Onset of symptoms began with back pain and chest pain that went down her spine and then down to her left LE. Code stroke called. 37 yo female w/PMHx hemochromatosis presents to the ED with onset of left sided weakness and numbness/tingling. Pt had a ct scan, MRI, and blood work done with no acute findings. Pt with recent LP, and recent diagnosis of viral meningitis. Pt on Valacyclovir. Pt states that last night at around 1 am she had a sudden onset of left sided weakness and numbness. Pt states she is unable to move her left side to her full ability. Onset of symptoms began with back pain and chest pain that went down her spine and then down to her left LE. Code stroke called. Pt's symptoms began on the right side but have since moved to the left side. 39 yo female w/PMHx hemochromatosis presents to the ED with onset of left sided weakness and numbness/tingling. Pt had a ct scan, MRI, and blood work done recently with no acute findings. Pt with recent LP, and was dx w/ viral meningitis, now on Valacyclovir. Pt states that last night at around 1 am she had a sudden onset of left sided weakness and numbness. Pt states she is unable to move her left side to her full ability. Onset of symptoms began with back pain and chest pain that went down her spine and then down to her left LE. Code stroke called. Pt's symptoms began on the right side but have since moved to the left side.

## 2023-10-24 NOTE — PATIENT PROFILE ADULT - FALL HARM RISK - RISK INTERVENTIONS

## 2023-10-24 NOTE — ED PROVIDER NOTE - CLINICAL SUMMARY MEDICAL DECISION MAKING FREE TEXT BOX
38-year-old female who presented with approximately 11 hours of left-sided weakness and numbness/tingling.  Patient was recently seen at outside hospital and diagnosed with viral meningitis after lumbar puncture, started on valacyclovir.  Patient also complaining of back pain that radiates around to the front of her body and headache.  Called as code stroke from [facility].  Less likely to be stroke but will evaluate with CTs, also consider intracranial hypotension given recent LP, vasculitis, encephalitis/cerebritis.  Will evaluate with blood work, neurology consult, likely admit.

## 2023-10-24 NOTE — H&P ADULT - NSHPPHYSICALEXAM_GEN_ALL_CORE
HEENT:  pupils equal and reactive, EOMI, no oropharyngeal lesions, erythema, exudates, oral thrush  NECK:   supple, no carotid bruits, no palpable lymph nodes, no thyromegaly  CV:  +S1, +S2, regular, no murmurs or rubs  RESP:   lungs clear to auscultation bilaterally, no wheezing, rales, rhonchi, good air entry bilaterally  GI:  abdomen soft, non-tender, non-distended, normal BS, no bruits, no abdominal masses, no palpable masses  MSK:   normal muscle tone, no atrophy, no rigidity, no contractions, + tenderness over the posterior spine mostly in the mid thoracic region  EXT:  no clubbing, no cyanosis, no edema, no calf pain, swelling or erythema  VASCULAR:  pulses equal and symmetric in the upper and lower extremities  NEURO:  AAOX3, CNs 2-12 intact, no facial droop. no slurred speech, speech clear and articulate, left side 4/5 strength UE/LE, right side is 5/5 UE/LE, gait not tested, decreased sensation on the left leg and left arm, + pronator drift on the left arm, unable to keep the left leg up in the air, CONCEPCIÓN equal and symmetric  SKIN:  no ulcers, lesions or rashes HEENT:  pupils equal and reactive, EOMI, no oropharyngeal lesions, erythema, exudates, oral thrush  NECK:   supple, no carotid bruits, no palpable lymph nodes, no thyromegaly  CV:  +S1, +S2, regular, no murmurs or rubs, no reproducible chest pain on palpation  RESP:   lungs clear to auscultation bilaterally, no wheezing, rales, rhonchi, good air entry bilaterally  GI:  abdomen soft, non-tender, non-distended, normal BS, no bruits, no abdominal masses, no palpable masses  MSK:   normal muscle tone, no atrophy, no rigidity, no contractions, + tenderness over the posterior spine mostly in the mid thoracic region  EXT:  no clubbing, no cyanosis, no edema, no calf pain, swelling or erythema  VASCULAR:  pulses equal and symmetric in the upper and lower extremities  NEURO:  AAOX3, CNs 2-12 intact, no facial droop. no slurred speech, speech clear and articulate, left side 4/5 strength UE/LE, right side is 5/5 UE/LE, gait not tested, decreased sensation on the left leg and left arm, + pronator drift on the left arm, unable to keep the left leg up in the air, CONCEPCIÓN equal and symmetric  SKIN:  no ulcers, lesions or rashes

## 2023-10-24 NOTE — H&P ADULT - ASSESSMENT
RECENT DIAGNOSIS OF ACUTE VIRAL MENINGITIS ABOUT 1 WEEK AGO, NOW PRESENTING WITH ACUTE LEFT SIDED WEAKNESS AND PARESTHESIAS ASSOCIATED WITH BACK PAIN AND MILD CHEST PAIN, R/O CVA, R/O SEIZURES, R/O VASCULITIS/CEREBRITIS, R/O DEMYELINATING PROCESS.  PASSED SWALLOW EVALUATION IN THE ED.    HASHIMOTOS THYROIDITIS    HEMACHROMATOSIS      PLAN:  -  admit to inpatient, tele, hospitalist service  -  will need to get records from [facility], medical records fax # is [phone]  -  start ASA and Atorvastatin  -  appreciate neuro consult  -  neurochecks q4 hours  -  continue Levothyroxine  -  telemetry monitoring  -  check EEG  -  check MRI of the brain with and without contrast  -  continue Valtrex until 10/27  -  DVT prophylaxis - venodynes  -  IV Zofran PRN  -  PT evaluation and treatment  -  speech evaluation/OT evaluation  -  PMR consult with Dr. Elijah Bowman  -  social work/case management consult    d/w patient, with mom at the bedside and with Dr. Alonzo   RECENT DIAGNOSIS OF ACUTE VIRAL MENINGITIS ABOUT 1 WEEK AGO, NOW PRESENTING WITH ACUTE LEFT SIDED WEAKNESS AND PARESTHESIAS ASSOCIATED WITH BACK PAIN AND MILD CHEST PAIN, R/O CVA, R/O SEIZURES, R/O VASCULITIS/CEREBRITIS, R/O DEMYELINATING PROCESS.  PASSED SWALLOW EVALUATION IN THE ED.    HASHIMOTOS THYROIDITIS WITH HYPOTHYROIDISM    HEMACHROMATOSIS      PLAN:  -  admit to inpatient, tele, hospitalist service  -  will need to get records from [facility], medical records fax # is [phone]  -  start ASA and Atorvastatin  -  appreciate neuro consult  -  neurochecks q4 hours  -  continue Levothyroxine  -  telemetry monitoring  -  check EEG  -  check MRI of the brain with and without contrast  -  continue Valtrex until 10/27  -  DVT prophylaxis - venodynes  -  IV Zofran PRN  -  PT evaluation and treatment  -  speech evaluation/OT evaluation  -  PMR consult with Dr. Elijah Bowman  -  social work/case management consult    d/w patient, with mom at the bedside and with Dr. Alonzo

## 2023-10-24 NOTE — H&P ADULT - HISTORY OF PRESENT ILLNESS
36 F with Hx of Hemachromatosis, Hashimoto's thyroiditis was out in [facility] and at [place] on 10/16 when she developed fevers associated with headache, neck pain, right ear pain and right eye pain, along with trouble getting words out and focusing, no trouble swallowing or saying words.  She was admitted at [facility] at that time from 10/16-10/19 where she had a full workup with CT, MR, and lumbar puncture.  She was diagnosed with acute viral meningitis, although many of the CSF tests were still pending at the time of discharge, and since she clinically improved, she was discharged home with oral Valcyclovir which she is supposed to take until 10/27.  She was slowly clinically improving and was doing ok at home.  However, on 10/22, she developed acute right sided weakness and paresthesias.  She came to  at that time and was evaluated in the ED.  CTA was negative.  She clinically improved and since she had follow up with neuro and her PCP, she was discharged home.  She was was seen by her PCP and was doing ok.  However, at 1:00am this morning, she wok up from sleep not feeling well and queezy.  When she tried to get out of the bed she experienced shooting back pain in the middle of her back and radiating down the spine associated with left sided arm and leg weakness, heaviness and paresthesias and she had trouble keeping her balance.  No recurrent fevers or headaches, or vomiting.  No witnessed seizure activity was reported.  No vision changes.  No trouble speaking or getting words out.  She contacted her PCP who advised her to come to the ED.      In the ED, a code stroke was called, patient was seen by Dr. Alonzo.  CTA was done and was negative.  She passed the dysphagia screen in the ED.    PAST MEDICAL HISTORY:  Hemachromatosis  Hashimotos Thyroiditis  Hypothyoidism    PAST SURGICAL HISTORY:  s/p rotator cuff surgery  s/p appendectomy    FAMILY HISTORY:   Father - lung cancer  Mother -  HTN     36 F with Hx of Hemachromatosis, Hashimoto's thyroiditis was out in [facility] and at [place] on 10/16 when she developed fevers associated with headache, neck pain, right ear pain and right eye pain, along with trouble getting words out and focusing, no trouble swallowing or saying words.  She was admitted at [facility] at that time from 10/16-10/19 where she had a full workup with CT, MR, and lumbar puncture.  She was diagnosed with acute viral meningitis, although many of the CSF tests were still pending at the time of discharge, and since she clinically improved, she was discharged home with oral Valcyclovir which she is supposed to take until 10/27.  She was slowly clinically improving and was doing ok at home.      However, on 10/22, she developed acute right sided weakness and paresthesias.  She came to  at that time and was evaluated in the ED.  CTA of the head was negative.  She clinically improved and since she had follow up with neuro and her PCP, she was discharged home.  She was seen by her PCP and was doing ok.      Early this morning around 1:00am, she woke up from sleep not feeling well and queezy.  When she tried to get out of the bed she experienced shooting back pain in the middle of her back and radiating down the spine associated with left sided arm and leg weakness, heaviness and paresthesias and she had trouble keeping her balance.  She had never experienced this in the past.  No recurrent fevers or headaches, or vomiting.  No witnessed seizure activity was reported.  No vision changes.  No trouble speaking or getting words out.  She contacted her PCP who advised her to come to the ED.      In the ED, a code stroke was called, patient was seen by Dr. Alonzo.  CTA was done and was negative.  She passed the dysphagia screen in the ED.    PAST MEDICAL HISTORY:  Hemachromatosis  Hashimotos Thyroiditis  Hypothyroidism    PAST SURGICAL HISTORY:  s/p rotator cuff surgery  s/p appendectomy    FAMILY HISTORY:   Father - lung cancer  Mother -  HTN     38 F with Hx of Hemachromatosis, Hashimoto's thyroiditis was out in [facility] and at [place] on 10/16 when she developed fevers associated with headache, neck pain, right ear pain and right eye pain, along with trouble getting words out and focusing, no trouble swallowing or saying words.  She was admitted at [facility] at that time from 10/16-10/19 where she had a full workup with CT, MR, and lumbar puncture.  She was diagnosed with acute viral meningitis, although many of the CSF tests were still pending at the time of discharge, and since she clinically improved, she was discharged home with oral Valcyclovir which she is supposed to take until 10/27.  She was slowly clinically improving and was doing ok at home.      However, on 10/22, she developed acute right sided weakness and paresthesias.  She came to  at that time and was evaluated in the ED.  CTA of the head was negative.  She clinically improved and since she had follow up with neuro and her PCP, she was discharged home.  She was seen by her PCP and was doing ok.      Early this morning around 1:00am, she woke up from sleep not feeling well and queezy.  When she tried to get out of the bed she experienced shooting back pain in the middle of her back and radiating down the spine associated with left sided arm and leg weakness, heaviness and paresthesias and she had trouble keeping her balance.  She had never experienced this in the past.  No recurrent fevers or headaches, or vomiting.  No witnessed seizure activity was reported.  No vision changes.  No trouble speaking or getting words out.  She contacted her PCP who advised her to come to the ED.      In the ED, a code stroke was called, patient was seen by Dr. Alonzo.  CTA was done and was negative.  She passed the dysphagia screen in the ED.    PAST MEDICAL HISTORY:  Hemachromatosis  Hashimotos Thyroiditis  Hypothyroidism    PAST SURGICAL HISTORY:  s/p rotator cuff surgery  s/p appendectomy    FAMILY HISTORY:   Father - lung cancer  Mother -  HTN

## 2023-10-24 NOTE — ED ADULT TRIAGE NOTE - CHIEF COMPLAINT QUOTE
Pt presents to ED c/o left sided numbness/ tingling, weakness since 1am this morning. Pt states "I was recently diagnosed with viral meningitis and had similar right sided weakness. But I was getting better. Then at 1am I started having this sharp pain in the side and my chest that shot down my spine and into my left leg. I became dizzy and lightheaded as well. The weakness I had to my right side was not as bad as it is right now on the left side." Pt observed to have left arm/ leg weakness with left arm drift. MD Perez called for stroke eval. Code stroke activated. Pt taken directly to CT.

## 2023-10-24 NOTE — STROKE CODE NOTE - NSMDCONSULT QTN_Y FT
38 year old woman with recent viral meningitis on valacyclovir, hashimoto's disease, hypothyroidism, presenting with L sided sensory and motor symptoms.      Symptom onset was 0100 this AM.  The patient initially felt chest pain, and shortness of breath, followed by numbness/tingling in the L arm and leg.  The symptoms persisted to the time of this evaluation.  She felt difficulty walking, and also was having some difficulty with speech.  Nofacial weakness, no facial numbness, no vision changes, no dysarthria.  Has weakness in the l arm and L leg, along with the sensory complaints.      She was seen on 10/22 in  ED with sensory changes in the R arm and leg.  She had a CTH/CTA in the ED, and was DC'd to home.  She said the symptoms did gradually improve, and by 10/23 they had essentially resolved.      She had recent hospitalization after having headache fevers, and went to [facility], where she had MRI brain, and LP that was positive for viral meningitis.       On exam:   GEN; NAD  CV: rRR, S1, S2  PULM: CTAB  GI: soft, non tender  EXTREM: no CCE  HEENT: no nuchal rigidity, no temporal tenderness.   SKIn: no rashes.   NEURO:   Awake, alert, oriented, follows commands, normal naming, fluency, reading, no neglect.    Pupils 3-2mm, symemtric, full VF's, normal EOM, conjugate gaze with no nystagmus, no facial weakness, facial sensation is symmetric to pinprick. Tongue midline, no dysarthria.   MOTOR: normal bulk and tone.  L arm and L leg drift.   SESNROY; intact symmetrically to pinprick sensatino  COORDINATION: normal fNF, limited in proportion to paresis on the L.   REFLEXES: 1+ symmetric BB, Br, triceps, 2+ patellar, achilles. Toes bilaterally down.     CTH images reviewed: no hemorrhage, no large territorial infarct, no loss of grey white differentiation.   CTA H&N images reviewed: no significant cervical carotid stenosis, no LVO.     AP: 38 year old woman with recent viral meningitis, hashimoto's disease, hypothyroidism, presenting with L sided sensory loss, weakness, chest pain, and shortness of breath, in setting of recent LP, diagnosis and treatment of viral meningitis.  On exam, has NIHSS of 2 for drift in the R arm and leg.  Her imaging is normal.  Cannot rule out a stroke.  Meningovasculitis can be a complication of CNS infection.  Additionally, she may be having ictal activity due to cortical irritation, the recurrent episodes may be focal seizures.     -will obtain MRI brain w/wo contrast  -EEG  -please give ASA 325mg in ED and continue with 81mg daily thereafter while pursuing a diagnosis  -atorvastatin 40mg  -neurology to follow.  Please page or call with any acute neuro changes, or other issues we can help address.

## 2023-10-24 NOTE — SWALLOW BEDSIDE ASSESSMENT ADULT - SWALLOW EVAL: CRITERIA FOR SKILLED INTERVENTION MET
DO NOT FEEL THAT ACUTE SPEECH PATHOLOGY FOLLOW UP WOULD CHANGE CLINICAL MANAGEMENT/OUTCOME IN HOSPITAL SETTING. PT'S SPEECH-LANGUAGE AND OROPHARYNGEAL SWALLOWING INTEGRITY ARE FUNCTIONAL/AT BASELINE/MAXIMIZED. GIVEN ABOVE, THIS SERVICE WILL NOT ACTIVELY FOLLOW. RECONSULT PRN SHOULD STATUS CHANGE AND CONDITION WARRANT.

## 2023-10-24 NOTE — ED ADULT NURSE NOTE - OBJECTIVE STATEMENT
37 yo female presents to the ED with onset of left sided weakness and numbness/tingling. Pt recently Dx with viral meningitis x1 week., on Valacyclovir. Pt states that last night at around 1 am she had a sudden onset of left sided weakness and numbness. Pt states she is unable to move her left side to her full ability. Onset of symptoms began with back pain and chest pain that went down her spine and then down to her left LE. Code stroke called in ED by MD Perez. Pt's symptoms began on the right side but have since moved to the left side. +PERLLA Brisk/equal/round/2mm B/L eyes. Sensory intact, pt able to move all extremities, Left sided weakness noted to KEITH BARR, NIH 2, Passed Dysphagia screening in ER

## 2023-10-25 ENCOUNTER — TRANSCRIPTION ENCOUNTER (OUTPATIENT)
Age: 38
End: 2023-10-25

## 2023-10-25 ENCOUNTER — APPOINTMENT (OUTPATIENT)
Dept: INTERNAL MEDICINE | Facility: CLINIC | Age: 38
End: 2023-10-25

## 2023-10-25 LAB
A1C WITH ESTIMATED AVERAGE GLUCOSE RESULT: 5.6 % — SIGNIFICANT CHANGE UP (ref 4–5.6)
A1C WITH ESTIMATED AVERAGE GLUCOSE RESULT: 5.6 % — SIGNIFICANT CHANGE UP (ref 4–5.6)
ANION GAP SERPL CALC-SCNC: 6 MMOL/L — SIGNIFICANT CHANGE UP (ref 5–17)
ANION GAP SERPL CALC-SCNC: 6 MMOL/L — SIGNIFICANT CHANGE UP (ref 5–17)
BUN SERPL-MCNC: 10 MG/DL — SIGNIFICANT CHANGE UP (ref 7–23)
BUN SERPL-MCNC: 10 MG/DL — SIGNIFICANT CHANGE UP (ref 7–23)
CALCIUM SERPL-MCNC: 8.4 MG/DL — LOW (ref 8.5–10.1)
CALCIUM SERPL-MCNC: 8.4 MG/DL — LOW (ref 8.5–10.1)
CHLORIDE SERPL-SCNC: 109 MMOL/L — HIGH (ref 96–108)
CHLORIDE SERPL-SCNC: 109 MMOL/L — HIGH (ref 96–108)
CHOLEST SERPL-MCNC: 210 MG/DL — HIGH
CHOLEST SERPL-MCNC: 210 MG/DL — HIGH
CO2 SERPL-SCNC: 25 MMOL/L — SIGNIFICANT CHANGE UP (ref 22–31)
CO2 SERPL-SCNC: 25 MMOL/L — SIGNIFICANT CHANGE UP (ref 22–31)
CREAT SERPL-MCNC: 0.9 MG/DL — SIGNIFICANT CHANGE UP (ref 0.5–1.3)
CREAT SERPL-MCNC: 0.9 MG/DL — SIGNIFICANT CHANGE UP (ref 0.5–1.3)
EGFR: 84 ML/MIN/1.73M2 — SIGNIFICANT CHANGE UP
EGFR: 84 ML/MIN/1.73M2 — SIGNIFICANT CHANGE UP
ESTIMATED AVERAGE GLUCOSE: 114 MG/DL — SIGNIFICANT CHANGE UP (ref 68–114)
ESTIMATED AVERAGE GLUCOSE: 114 MG/DL — SIGNIFICANT CHANGE UP (ref 68–114)
GLUCOSE SERPL-MCNC: 102 MG/DL — HIGH (ref 70–99)
GLUCOSE SERPL-MCNC: 102 MG/DL — HIGH (ref 70–99)
HDLC SERPL-MCNC: 42 MG/DL — LOW
HDLC SERPL-MCNC: 42 MG/DL — LOW
LIPID PNL WITH DIRECT LDL SERPL: 147 MG/DL — HIGH
LIPID PNL WITH DIRECT LDL SERPL: 147 MG/DL — HIGH
NON HDL CHOLESTEROL: 168 MG/DL — HIGH
NON HDL CHOLESTEROL: 168 MG/DL — HIGH
POTASSIUM SERPL-MCNC: 4.1 MMOL/L — SIGNIFICANT CHANGE UP (ref 3.5–5.3)
POTASSIUM SERPL-MCNC: 4.1 MMOL/L — SIGNIFICANT CHANGE UP (ref 3.5–5.3)
POTASSIUM SERPL-SCNC: 4.1 MMOL/L — SIGNIFICANT CHANGE UP (ref 3.5–5.3)
POTASSIUM SERPL-SCNC: 4.1 MMOL/L — SIGNIFICANT CHANGE UP (ref 3.5–5.3)
SODIUM SERPL-SCNC: 140 MMOL/L — SIGNIFICANT CHANGE UP (ref 135–145)
SODIUM SERPL-SCNC: 140 MMOL/L — SIGNIFICANT CHANGE UP (ref 135–145)
TRIGL SERPL-MCNC: 117 MG/DL — SIGNIFICANT CHANGE UP
TRIGL SERPL-MCNC: 117 MG/DL — SIGNIFICANT CHANGE UP

## 2023-10-25 PROCEDURE — 95819 EEG AWAKE AND ASLEEP: CPT | Mod: 26

## 2023-10-25 PROCEDURE — 99221 1ST HOSP IP/OBS SF/LOW 40: CPT

## 2023-10-25 PROCEDURE — 99233 SBSQ HOSP IP/OBS HIGH 50: CPT

## 2023-10-25 PROCEDURE — 70553 MRI BRAIN STEM W/O & W/DYE: CPT | Mod: 26

## 2023-10-25 RX ADMIN — Medication 81 MILLIGRAM(S): at 10:02

## 2023-10-25 RX ADMIN — Medication 25 MICROGRAM(S): at 05:35

## 2023-10-25 RX ADMIN — VALACYCLOVIR 1000 MILLIGRAM(S): 500 TABLET, FILM COATED ORAL at 13:17

## 2023-10-25 RX ADMIN — Medication 650 MILLIGRAM(S): at 16:45

## 2023-10-25 RX ADMIN — Medication 650 MILLIGRAM(S): at 03:41

## 2023-10-25 RX ADMIN — Medication 650 MILLIGRAM(S): at 10:02

## 2023-10-25 RX ADMIN — VALACYCLOVIR 1000 MILLIGRAM(S): 500 TABLET, FILM COATED ORAL at 05:36

## 2023-10-25 RX ADMIN — VALACYCLOVIR 1000 MILLIGRAM(S): 500 TABLET, FILM COATED ORAL at 20:22

## 2023-10-25 NOTE — PHYSICAL THERAPY INITIAL EVALUATION ADULT - SITTING BALANCE: DYNAMIC
resolved torsemide increased to bid  follow up with cardio home meds follow up with olga low salt diet home metformin a1c 7.3 normal balance Continue on torsemide. Follow up with Cardiology within one week. . Follow up with Cardiology for further management.

## 2023-10-25 NOTE — PHYSICAL THERAPY INITIAL EVALUATION ADULT - ORIENTATION, REHAB EVAL
No heavy lifting over 20lbs for 6 weeks total postop  Diet and activity as tolerated otherwise  OK to return to work for light duty  Follow up with general surgery office as needed  Refer to GI for followup regarding peptic ulcer disease and hepatitis C
oriented to person, place, time and situation
no

## 2023-10-25 NOTE — PHYSICAL THERAPY INITIAL EVALUATION ADULT - DIAGNOSIS, PT EVAL
R/O Stroke, RECENT DIAGNOSIS OF ACUTE VIRAL MENINGITIS, ACUTE LEFT SIDED WEAKNESS AND PARESTHESIAS MRI reveals no Stroke, RECENT DIAGNOSIS OF ACUTE VIRAL MENINGITIS, ACUTE LEFT SIDED WEAKNESS AND PARESTHESIAS

## 2023-10-25 NOTE — PROGRESS NOTE ADULT - ASSESSMENT
38 year old woman with recent viral meningitis, hashimoto's disease, hypothyroidism, presenting on 10/24 with L sided sensory loss, weakness, chest pain, and shortness of breath, in setting of recent LP, diagnosis and treatment of viral meningitis.  Symptoms now mostly resolved.  On exam, has NIHSS of 0 today.  There is only trace weakness in the L leg on confrontational testing.  MRI performed this AM normal to my eye.  Radiology report pending.    No evidence for stroke or TIA.  Symptom duration was >12 hours.  Alternative diagnoses either focal seizure or migraine phenomenon.  Of note, her mother does have history of migraine with somatosensory and motor symptoms.     -EEG pending for today.  If abnormal, possible start AED's.  If normal, will hold off.    -was started on ASA in the ED.  would DC at this point.    -neurology to follow.  Please page or call with any acute neuro changes, or other issues we can help address.

## 2023-10-25 NOTE — PROGRESS NOTE ADULT - ASSESSMENT
38 year old woman hx of Hashimoto's thyroiditis, hypothyroidism, was in her usual state of health until approximately 10 days prior to presentation, [place] fever, headache and [place] , was admitted to [facility], underwent extensive testing including neuroimaging, LP, etc and was diagnosed with viral meningitis, treated with antiviral medication and clinically improved thereafter, discharged home 10/18. On 10/22 she woke up with numbness and tingling on the right arm with radiation up to the face and weakness on the right side. She was seen here in  ED, had CT head, CT brain stroke protocol and CTA head and neck, normal, discharged home thereafter. Returned 10/24 with now L sided sensory loss, weakness. CT head, brain perfusion and CTA head and neck repeated, again normal. Patient was continued on her valacyclovir, ordered for MRI WWO, EEG, admitted to Medicine    L sided sensory loss, weakness  Appreciate input from Neurology. No evidence for stroke or TIA.  Symptom duration was >12 hours.  Alternative diagnoses either focal seizure or migraine phenomenon. Of note, her mother does have history of migraine with somatosensory and motor symptoms. EEG done today. Findings maybe suggestive of focal cerebral dysfunction. No epileptiform activity was seen and no clinical events or seizures were recorded.   - Will f/u with Neurology re whether any anti-epileptic medication is warranted and/or whether empiric migraine treatment should be offered  - Continue valacyclovir for recent viral meningitis  - Requested records from [facility]    Hypothyroidism  Stable  - Continue levothyroxine       38 year old woman hx of Hashimoto's thyroiditis, hypothyroidism, was in her usual state of health until approximately 10 days prior to presentation, developed fever, headache and [place] pain [place] , was admitted to [facility], underwent extensive testing including neuroimaging, LP, etc and was diagnosed with viral meningitis, treated with antiviral medication and clinically improved thereafter, discharged home 10/18. On 10/22 she woke up with numbness and tingling on the right arm with radiation up to the face and weakness on the right side. She was seen here in  ED, had CT head, CT brain stroke protocol and CTA head and neck, normal, discharged home thereafter. Returned 10/24 with now L sided sensory loss, weakness. CT head, brain perfusion and CTA head and neck repeated, again normal. Patient was continued on her valacyclovir, ordered for MRI WWO, EEG, admitted to Medicine    L sided sensory loss, weakness  Appreciate input from Neurology. No evidence for stroke or TIA. MR brain negative. Symptom duration >12 hours. Alternative diagnoses either focal seizure or migraine phenomenon. Of note, her mother does have history of migraine with somatosensory and motor symptoms. EEG done today. Findings maybe suggestive of focal cerebral dysfunction. No epileptiform activity was seen and no clinical events or seizures were recorded.   - Will f/u with Neurology re whether any anti-epileptic medication is warranted and/or whether empiric migraine treatment should be offered  - Continue valacyclovir for recent viral meningitis  - Requested records from [facility]    Hypothyroidism  Stable  - Continue levothyroxine

## 2023-10-25 NOTE — CONSULT NOTE ADULT - ASSESSMENT
ASSESSMENT/PLAN  38yFemale with functional deficits after  Pain - Tylenol  DVT PPX - SCDs  Rehab -     Pending evaluation     Recommend ACUTE inpatient rehabilitation for the functional deficits consisting of 3 hours of therapy/day & 24 hour RN/daily PMR physician for comorbid medical management. Patient will be able to tolerate 3 hours a day.   Recommend CLYDE, patient DOES NOT meet acute inpatient rehabilitation criteria. Patient needs a more prolonged stay to achieve transition to community.    Expect patient to achieve functional goals for DC HOME with OUTPATIENT   Expect patient to achieve functional goals for DC HOME with HOME CARE   Follow up with CONCUSSION PROGRAM - Call [phone] for an appointment    Will continue to follow. Functional progress will determine ongoing rehab dispo recommendations, which may change.    Continue bedside therapy as well as OOB throughout the day with mobilization by staff to maintain cardiopulmonary function and prevention of secondary complications related to debility.     Discussed with rehab team.  ASSESSMENT/PLAN  38yFemale with functional deficits after viral meningitis  Pain - Tylenol  DVT PPX - SCDs  Rehab -      Expect patient to achieve functional goals for DC HOME with OUTPATIENT    Will continue to follow. Functional progress will determine ongoing rehab dispo recommendations, which may change.    Continue bedside therapy as well as OOB throughout the day with mobilization by staff to maintain cardiopulmonary function and prevention of secondary complications related to debility.

## 2023-10-25 NOTE — PHYSICAL THERAPY INITIAL EVALUATION ADULT - GENERAL OBSERVATIONS, REHAB EVAL
Pt was found lying in bed with tele on, Pt is pleasant and willing to participate in PT, c/o headache after MRI, mild left side weakness

## 2023-10-25 NOTE — DISCHARGE NOTE NURSING/CASE MANAGEMENT/SOCIAL WORK - PATIENT PORTAL LINK FT
You can access the FollowMyHealth Patient Portal offered by [facility] by registering at the following website: [email or web address]. By joining [employer]’s FollowMyHealth portal, you will also be able to view your health information using other applications (apps) compatible with our system.

## 2023-10-25 NOTE — CONSULT NOTE ADULT - SUBJECTIVE AND OBJECTIVE BOX
38yF was admitted on 10-24    Patient is a 38y old  Female who presents with a chief complaint of left sided weakness and concern for stroke (25 Oct 2023 09:49)    HPI:      36 F with Hx of Hemachromatosis, Hashimoto's thyroiditis was out in [facility] and at [place] on 10/16 when she developed fevers associated with headache, neck pain, right ear pain and right eye pain, along with trouble getting words out and focusing, no trouble swallowing or saying words.  She was admitted at [facility] at that time from 10/16-10/19 where she had a full workup with CT, MR, and lumbar puncture.  She was diagnosed with acute viral meningitis, although many of the CSF tests were still pending at the time of discharge, and since she clinically improved, she was discharged home with oral Valcyclovir which she is supposed to take until 10/27.  She was slowly clinically improving and was doing ok at home.      However, on 10/22, she developed acute right sided weakness and paresthesias.  She came to  at that time and was evaluated in the ED.  CTA of the head was negative.  She clinically improved and since she had follow up with neuro and her PCP, she was discharged home.  She was seen by her PCP and was doing ok.      Early this morning around 1:00am, she woke up from sleep not feeling well and queezy.  When she tried to get out of the bed she experienced shooting back pain in the middle of her back and radiating down the spine associated with left sided arm and leg weakness, heaviness and paresthesias and she had trouble keeping her balance.  She had never experienced this in the past.  No recurrent fevers or headaches, or vomiting.  No witnessed seizure activity was reported.  No vision changes.  No trouble speaking or getting words out.  She contacted her PCP who advised her to come to the ED.      In the ED, a code stroke was called, patient was seen by Dr. Alonzo.  CTA was done and was negative.  She passed the dysphagia screen in the ED.    PAST MEDICAL HISTORY:  Hemachromatosis  Hashimotos Thyroiditis  Hypothyroidism    PAST SURGICAL HISTORY:  s/p rotator cuff surgery  s/p appendectomy    FAMILY HISTORY:   Father - lung cancer  Mother -  HTN (24 Oct 2023 14:26)      Imaging performed:  Head Ct 10/22/2023  No acute intracranial hemorrhage, mass effect, or midline shift.    Head CT perfusion 10/22/2023  No proximal large vessel occlusion, stenosis, or evidence of dissection.  No acute perfusion abnormalities.    MRI of the Brain is pending    REVIEW OF SYSTEMS  Constitutional - No fever, No weight loss, No fatigue  HEENT - No eye pain, No visual disturbances, No difficulty hearing, No tinnitus, No vertigo, No neck pain  Respiratory - No cough, No wheezing, No shortness of breath  Cardiovascular - No chest pain, No palpitations  Gastrointestinal - No abdominal pain, No nausea, No vomiting, No diarrhea, No constipation  Genitourinary - No dysuria, No frequency, No hematuria, No incontinence  Neurological - No headaches, No memory loss, No loss of strength, No numbness, No tremors  Skin - No itching, No rashes, No lesions   Endocrine - No temperature intolerance  Musculoskeletal - No joint pain, No joint swelling, No muscle pain  Psychiatric - No depression, No anxiety    VITALS  T(C): 36.4 (10-25-23 @ 10:00), Max: 37.7 (10-24-23 @ 16:40)  HR: 85 (10-25-23 @ 10:00) (61 - 85)  BP: 109/69 (10-25-23 @ 10:00) (99/42 - 113/67)  RR: 18 (10-25-23 @ 10:00) (18 - 22)  SpO2: 99% (10-25-23 @ 10:00) (98% - 100%)  Wt(kg): --    PAST MEDICAL & SURGICAL HISTORY  No pertinent past medical history    Hemochromatosis    No significant past surgical history        SOCIAL HISTORY - as per documentation/history  Smoking - None  EtOH - None  Drugs - None    FUNCTIONAL HISTORY  Lives   Independent    CURRENT FUNCTIONAL STATUS      FAMILY HISTORY       RECENT LABS - Reviewed  CBC Full  -  ( 24 Oct 2023 11:56 )  WBC Count : 8.61 K/uL  RBC Count : 4.32 M/uL  Hemoglobin : 13.4 g/dL  Hematocrit : 38.0 %  Platelet Count - Automated : 314 K/uL  Mean Cell Volume : 88.0 fl  Mean Cell Hemoglobin : 31.0 pg  Mean Cell Hemoglobin Concentration : 35.3 gm/dL  Auto Neutrophil # : 4.50 K/uL  Auto Lymphocyte # : 3.42 K/uL  Auto Monocyte # : 0.51 K/uL  Auto Eosinophil # : 0.12 K/uL  Auto Basophil # : 0.04 K/uL  Auto Neutrophil % : 52.3 %  Auto Lymphocyte % : 39.7 %  Auto Monocyte % : 5.9 %  Auto Eosinophil % : 1.4 %  Auto Basophil % : 0.5 %    10-25    140  |  109<H>  |  10  ----------------------------<  102<H>  4.1   |  25  |  0.90    Ca    8.4<L>      25 Oct 2023 06:51    TPro  7.9  /  Alb  4.0  /  TBili  0.5  /  DBili  x   /  AST  7<L>  /  ALT  23  /  AlkPhos  62  10-24    Urinalysis Basic - ( 25 Oct 2023 06:51 )    Color: x / Appearance: x / SG: x / pH: x  Gluc: 102 mg/dL / Ketone: x  / Bili: x / Urobili: x   Blood: x / Protein: x / Nitrite: x   Leuk Esterase: x / RBC: x / WBC x   Sq Epi: x / Non Sq Epi: x / Bacteria: x        ALLERGIES  penicillin (Short breath)      MEDICATIONS   acetaminophen     Tablet .. 650 milliGRAM(s) Oral every 6 hours PRN  atorvastatin 40 milliGRAM(s) Oral at bedtime  influenza   Vaccine 0.5 milliLiter(s) IntraMuscular once  levothyroxine 25 MICROGram(s) Oral daily  ondansetron Injectable 4 milliGRAM(s) IV Push once  ondansetron Injectable 4 milliGRAM(s) IV Push every 6 hours PRN  valACYclovir 1000 milliGRAM(s) Oral three times a day      ----------------------------------------------------------------------------------------  PHYSICAL EXAM  Constitutional - NAD, Comfortable  HEENT - NCAT, EOMI  Neck - Supple, No limited ROM  Chest - Breathing comfortably, No wheezing  Cardiovascular - S1S2   Abdomen - Soft   Extremities - No C/C/E, No calf tenderness   Neurologic Exam -                    Cognitive - AAO to self, place, date, year, situation     Communication - Fluent, No dysarthria     Cranial Nerves - CN 2-12 intact     Motor - No focal deficits                    LEFT    UE - ShAB 5/5, EF 5/5, EE 5/5, WE 5/5,  5/5                    RIGHT UE - ShAB 5/5, EF 5/5, EE 5/5, WE 5/5,  5/5                    LEFT    LE - HF 5/5, KE 5/5, DF 5/5, PF 5/5                    RIGHT LE - HF 5/5, KE 5/5, DF 5/5, PF 5/5        Sensory - Intact to LT     Reflexes - DTR Intact, No primitive reflexive     Coordination - FTN intact     OculoVestibular - No saccades, No nystagmus, VOR         Balance - WNL Static  Psychiatric - Mood stable, Affect WNL  ----------------------------------------------------------------------------------------

## 2023-10-25 NOTE — PHYSICAL THERAPY INITIAL EVALUATION ADULT - NSPTDISCHREC_GEN_A_CORE
Pt was able to amb 75' independently w/supervision w/o any AD, small shuffling steps, pt having headache and neck pain, Pt can amb with floor staff, no acute care PT needs, pt may benefit from Outpt PT for left side weakness, pt was left in bed at end of PT rx, was given cold packs for pain, DC from PT/Outpatient PT

## 2023-10-25 NOTE — PHYSICAL THERAPY INITIAL EVALUATION ADULT - PERTINENT HX OF CURRENT PROBLEM, REHAB EVAL
36 F with Hx of Hemachromatosis, Hashimoto's thyroiditis was out in [facility] and at [place] on 10/16 when she developed fevers associated with headache, neck pain, right ear pain and right eye pain, along with trouble getting words out and focusing, no trouble swallowing or saying words.  She was admitted at [facility] at that time from 10/16-10/19 where she had a full workup with CT, MR, and lumbar puncture.  She was diagnosed with acute viral meningitis, and since she clinically improved, she was discharged home, However, on 10/22, she developed acute right sided weakness and paresthesias.  She came to  at that time and was evaluated in the ED.  CTA of the head was negative. she was discharged home, On 24 morning, she woke up from sleep not feeling well and queezy.  When she tried to get out of the bed she experienced shooting back pain in the middle of her back and radiating down the spine associated with left sided arm and leg weakness, heaviness and paresthesias and she had trouble keeping her balance. Came to ED for work-up

## 2023-10-26 LAB
ADD ON TEST-SPECIMEN IN LAB: SIGNIFICANT CHANGE UP
CRP SERPL-MCNC: <3 MG/L — SIGNIFICANT CHANGE UP
CRP SERPL-MCNC: <3 MG/L — SIGNIFICANT CHANGE UP
ERYTHROCYTE [SEDIMENTATION RATE] IN BLOOD: 8 MM/HR — SIGNIFICANT CHANGE UP (ref 0–15)
ERYTHROCYTE [SEDIMENTATION RATE] IN BLOOD: 8 MM/HR — SIGNIFICANT CHANGE UP (ref 0–15)
RHEUMATOID FACT SERPL-ACNC: <10 IU/ML — SIGNIFICANT CHANGE UP (ref 0–13)
RHEUMATOID FACT SERPL-ACNC: <10 IU/ML — SIGNIFICANT CHANGE UP (ref 0–13)

## 2023-10-26 PROCEDURE — 99232 SBSQ HOSP IP/OBS MODERATE 35: CPT

## 2023-10-26 PROCEDURE — 99223 1ST HOSP IP/OBS HIGH 75: CPT

## 2023-10-26 RX ORDER — METOCLOPRAMIDE HCL 10 MG
5 TABLET ORAL EVERY 8 HOURS
Refills: 0 | Status: COMPLETED | OUTPATIENT
Start: 2023-10-26 | End: 2023-10-27

## 2023-10-26 RX ORDER — MAGNESIUM SULFATE 500 MG/ML
1 VIAL (ML) INJECTION EVERY 12 HOURS
Refills: 0 | Status: COMPLETED | OUTPATIENT
Start: 2023-10-26 | End: 2023-10-27

## 2023-10-26 RX ORDER — KETOROLAC TROMETHAMINE 30 MG/ML
30 SYRINGE (ML) INJECTION EVERY 8 HOURS
Refills: 0 | Status: DISCONTINUED | OUTPATIENT
Start: 2023-10-26 | End: 2023-10-27

## 2023-10-26 RX ORDER — DIPHENHYDRAMINE HCL 50 MG
25 CAPSULE ORAL EVERY 8 HOURS
Refills: 0 | Status: COMPLETED | OUTPATIENT
Start: 2023-10-26 | End: 2023-10-27

## 2023-10-26 RX ORDER — SODIUM CHLORIDE 9 MG/ML
1000 INJECTION INTRAMUSCULAR; INTRAVENOUS; SUBCUTANEOUS
Refills: 0 | Status: COMPLETED | OUTPATIENT
Start: 2023-10-26 | End: 2023-10-26

## 2023-10-26 RX ADMIN — VALACYCLOVIR 1000 MILLIGRAM(S): 500 TABLET, FILM COATED ORAL at 14:16

## 2023-10-26 RX ADMIN — Medication 30 MILLIGRAM(S): at 15:00

## 2023-10-26 RX ADMIN — Medication 25 MICROGRAM(S): at 05:26

## 2023-10-26 RX ADMIN — Medication 5 MILLIGRAM(S): at 14:14

## 2023-10-26 RX ADMIN — Medication 25 MILLIGRAM(S): at 14:15

## 2023-10-26 RX ADMIN — Medication 100 GRAM(S): at 22:14

## 2023-10-26 RX ADMIN — Medication 30 MILLIGRAM(S): at 14:14

## 2023-10-26 RX ADMIN — Medication 25 MILLIGRAM(S): at 22:08

## 2023-10-26 RX ADMIN — Medication 650 MILLIGRAM(S): at 06:05

## 2023-10-26 RX ADMIN — SODIUM CHLORIDE 100 MILLILITER(S): 9 INJECTION INTRAMUSCULAR; INTRAVENOUS; SUBCUTANEOUS at 10:10

## 2023-10-26 RX ADMIN — VALACYCLOVIR 1000 MILLIGRAM(S): 500 TABLET, FILM COATED ORAL at 05:25

## 2023-10-26 RX ADMIN — Medication 5 MILLIGRAM(S): at 22:08

## 2023-10-26 RX ADMIN — VALACYCLOVIR 1000 MILLIGRAM(S): 500 TABLET, FILM COATED ORAL at 22:09

## 2023-10-26 RX ADMIN — Medication 30 MILLIGRAM(S): at 22:09

## 2023-10-26 NOTE — PROGRESS NOTE ADULT - ASSESSMENT
38 year old woman hx of Hashimoto's thyroiditis, hypothyroidism, hemochromatosis was in her usual state of health until approximately 10 days prior to presentation, [place] fever, headache [place] was admitted to [facility], underwent extensive testing including neuroimaging, LP, etc and was diagnosed with suspected viral meningitis, treated with antiviral medication and clinically improved thereafter, discharged home 10/18. On 10/22 she woke up with numbness and tingling on the right arm with radiation up to the face and weakness on the right side. She was seen here in  ED, had CT head, CT brain stroke protocol and CTA head and neck, normal, discharged home thereafter. Returned 10/24 with now L sided sensory loss, weakness. CT head, brain perfusion and CTA head and neck repeated, again normal. Patient was continued on her valacyclovir, ordered for MRI WWO, EEG, admitted to Medicine    L sided sensory loss, weakness  Reviewed records from [facility]. Appreciate input from Neurology. No evidence for stroke or TIA. Symptom duration >12 hours. MR brain negative. EEG done, no epileptiform activity was seen and no clinical events or seizures were recorded. Alternative diagnosis migraine phenomenon as her mother does have history of migraine with somatosensory and motor symptoms. Also, patient's trip to [place] was marking 1 year from the time of her father's passing. Discussed case further with Neurology. Started patient on trial of migraine treatment.   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

## 2023-10-26 NOTE — CONSULT NOTE ADULT - ASSESSMENT
This is a 38 year old female with hx of Hashimoto's,  hemachromatosis, nephrolithiasis who presented on 10/24/23 to  due to acute onset left sided arm/leg weakness, paresthesias, imbalance, as well as shooting back pain that started overnight  Patient's symptoms started around 10/16 in the setting of congestion, fluid in her ear, blurry vision, low grade fevers, headaches,  neck pain, confusion, word finding difficulty, as well as pain in her right eye and ear along with photophobia and she was admitted to [facility] from 10/16-10/19/23 where she had a lumbar puncture reportedly with pleocytosis and was treated for acute viral meningitis.   However, since then she had  acute right sided weakness in her arm and paresthesia on 10/22/23 and then had left sided arm/leg weakness, paresthesias, imbalance, as well as shooting back pain on 10/24/23.     Since being in the hospital she has had a CTA brain/neck, CT brain, MRI head which were all without acute changes. EEG findings maybe suggestive of focal cerebral dysfunction. No epileptiform activity was seen, no clinical events or seizures recorded. Her ESR is wnl.    On rheumatological evaluation, patient denies any obvious symptoms of underlying connective tissue disease such as  joint pain, joint swelling, rashes, pericarditis, pleuritis, oral ulcers, fevers, sicca symptoms, chest pain, cough, diarrhea, blood in stool, hematuria. Exam without rashes, oral ulcers, sicca changes, synovitis, joint tenderness.   Current clinical picture is not convincing for a rheumatological autoimmune condition given above findings/hx. Can evaluate further with serologies.    -Labs pending: KAMINI, dsDNA, RF, CRP,  ANCA relfex MPO and PR3  -Please add thyroid antibodies,  C3, C4. ACE level to above work up  -Please inform rheumatology         This is a 38 year old female with hx of Hashimoto's, hemachromatosis, nephrolithiasis who presented on 10/24/23 to  due to acute onset left sided arm/leg weakness, paresthesias, imbalance, as well as shooting back pain that started overnight  Patient's symptoms started around 10/16 in the setting of congestion, fluid in her ear, blurry vision, low grade fevers, headaches,  neck pain, confusion, word finding difficulty, as well as pain in her right eye and ear along with photophobia and she was admitted to [facility] from 10/16-10/19/23 where she had a lumbar puncture reportedly with pleocytosis and was treated for acute viral meningitis.   However, since then she had  acute right sided weakness in her arm and paresthesia on 10/22/23 and then had left sided arm/leg weakness, paresthesias, imbalance, as well as shooting back pain on 10/24/23.     Since being in the hospital she has had a CTA brain/neck, CT brain, MRI head which were all without acute findings. EEG findings maybe suggestive of focal cerebral dysfunction. No epileptiform activity was seen, no clinical events or seizures recorded. Her ESR is wnl and no renal insufficiency,  anemia, leukopenia, thrombocytopenia noted.    On rheumatological evaluation, patient denies any obvious symptoms of underlying connective tissue disease such as  joint pain, joint swelling, rashes, pericarditis, pleuritis, oral ulcers, fevers, sicca symptoms, chest pain, cough, diarrhea, blood in stool, hematuria. Exam without rashes, oral ulcers, sicca changes, synovitis, joint tenderness.   Current clinical picture is not convincing for a rheumatological autoimmune condition given above findings/hx. Can evaluate further with serologies.    -Labs pending: KAMINI, dsDNA, RF, CRP,  ANCA relfex MPO and PR3  -Please add thyroid antibodies, C3, C4, ACE level to above work up  -Please inform rheumatology of abnormal lab results.          This is a 38 year old female with hx of Hashimoto's, hemachromatosis, nephrolithiasis who presented on 10/24/23 to  due to acute onset left sided arm/leg weakness, paresthesias, imbalance, as well as shooting back pain that started overnight  Patient's symptoms started around 10/16 in the setting of congestion, fluid in her ear, blurry vision, low grade fevers, headaches,  neck pain, confusion, word finding difficulty, as well as pain in her right eye and ear along with photophobia and she was admitted to [facility] from 10/16-10/19/23 where she had a lumbar puncture reportedly with pleocytosis and was treated for acute viral meningitis.   However, since then she had  acute right sided weakness in her arm and paresthesia on 10/22/23 and then had left sided arm/leg weakness, paresthesias, imbalance, as well as shooting back pain on 10/24/23.     Since being in the hospital she has had a CTA brain/neck, CT brain, MRI head which were all without acute findings. EEG findings maybe suggestive of focal cerebral dysfunction. No epileptiform activity was seen, no clinical events or seizures recorded. Her ESR is wnl and no renal insufficiency,  anemia, leukopenia, thrombocytopenia noted.    On rheumatological evaluation, patient denies any obvious symptoms of underlying connective tissue disease such as  joint pain, joint swelling, rashes, pericarditis, pleuritis, oral ulcers, fevers, sicca symptoms, chest pain, cough, diarrhea, blood in stool, hematuria. Exam without rashes, oral ulcers, sicca changes, synovitis, joint tenderness.   Current clinical picture is not convincing for a rheumatological autoimmune condition given above findings/hx. Although there is no obvious signs of connective tissue disease, can evaluate with serologies for further evaluation    -Labs already pending: KAMINI, dsDNA, RF, CRP,  ANCA relfex MPO and PR3  -Can do further workup with thyroid antibodies, C3, C4, ACE level  -Please inform rheumatology of abnormal lab results.

## 2023-10-26 NOTE — CONSULT NOTE ADULT - SUBJECTIVE AND OBJECTIVE BOX
As per chart review:    This is a 38 year old female with hx of hashimoto, hemachromatosis who presented on 10/24/23 to [facility] due to fevers, headaches,  neck pain, confusion, as well as pain in her right eye and ear.  38 F with Hx of Hemachromatosis, Hashimoto's thyroiditis was out in [facility] and at [place] on 10/16 when she developed fevers associated with headache, neck pain, right ear pain and right eye pain, along with trouble getting words out and focusing, no trouble swallowing or saying words.  She was admitted at [facility] at that time from 10/16-10/19 where she had a full workup with CT, MR, and lumbar puncture.  She was diagnosed with acute viral meningitis, although many of the CSF tests were still pending at the time of discharge, and since she clinically improved, she was discharged home with oral Valcyclovir which she is supposed to take until 10/27.  She was slowly clinically improving and was doing ok at home.      However, on 10/22, she developed acute right sided weakness and paresthesias.  She came to  at that time and was evaluated in the ED.  CTA of the head was negative.  She clinically improved and since she had follow up with neuro and her PCP, she was discharged home.  She was seen by her PCP and was doing ok.      Early this morning around 1:00am, she woke up from sleep not feeling well and queezy.  When she tried to get out of the bed she experienced shooting back pain in the middle of her back and radiating down the spine associated with left sided arm and leg weakness, heaviness and paresthesias and she had trouble keeping her balance.  She had never experienced this in the past.  No recurrent fevers or headaches, or vomiting.  No witnessed seizure activity was reported.  No vision changes.  No trouble speaking or getting words out.  She contacted her PCP who advised her to come to the ED.      In the ED, a code stroke was called, patient was seen by Dr. Alonzo.  CTA was done and was negative.  She passed the dysphagia screen in the ED.    PAST MEDICAL HISTORY:  Hemachromatosis  Hashimotos Thyroiditis  Hypothyroidism    PAST SURGICAL HISTORY:  s/p rotator cuff surgery  s/p appendectomy    FAMILY HISTORY:        As per chart review:    This is a 38 year old female with hx of hashimoto, hemachromatosis who presented on 10/24/23 to  due to  symptoms including left sided arm/leg weakness, paresthesias, imbalance, as well as back pain that started acutely,    Her initial symptoms ( fevers, headaches,  neck pain, confusion, as well as pain in her right eye and ear) started on 10/16 and she was admitted to [facility] from 10/16-10/19/23. As per the chart, she had a lumbar puncture and was treated for acute viral meningitis. She was given oral valcylovir on discharge to be completed on 10/27/23 and she was initially improving on this.  However, she had  acute right sided weakness and paresthesias on 10/22 and she presented to  at which time her CTA was without acute pathology and she was discharged home.    On 10/24/23, patient presented again due to left sided arm/leg weakness, paresthesias, imbalance, as well as back pain that started acutely in the middle of the night,     Since being in the hospital she has had a CTA brain/neck, dysphagia screen, CT brain, MRI head which were all without acute changes.      Labs/Imaging:    Labs:   Cr .90  GFR 84 AST 9 ALT 29  WBC 8.61 Hg 13.4 UA neg protein neg blood    CT angio brain/neck/ brain [id number]  IMPRESSION:  No proximal large vessel occlusion, stenosis, or evidence of dissection.No acute perfusion abnormalities.      CT Brain 10-22-23  IMPRESSION:  No acute intracranial hemorrhage, mass effect, or midline shift.    CT Angio Brain Neck Stroke protocol 10-24-23  HEAD CT: No evidence of an acute intracranial hemorhage, midline shift or   hydrocephalus.  NECK CTA: No hemodynamic significant narowing within the neck.  BRAIN CTA: No proximal large vessel occlusion.  CT PERFUSION: No areas of ischemia identified.    MRI head 10-25-23  Impression:  Unremarkable pre and postcontrast enhanced MRI of the brain    PAST SURGICAL HISTORY:  rotator cuff surgery  appendectomy    FAMILY HISTORY:   No family hx of autoimmune disease          As per chart review:    This is a 38 year old female with hx of hashimoto, hemachromatosis who presented on 10/24/23 to  due to  symptoms including left sided arm/leg weakness, paresthesias, imbalance, as well as back pain that started acutely,    Her initial symptoms ( fevers, headaches,  neck pain, confusion, as well as pain in her right eye and ear) started on 10/16 and she was admitted to [facility] from 10/16-10/19/23. As per the chart, she had a lumbar puncture and was treated for acute viral meningitis. She was given oral valcylovir on discharge to be completed on 10/27/23 and she was initially improving on this.  However, she had  acute right sided weakness and paresthesias on 10/22 and she presented to  at which time her CTA was without acute pathology and she was discharged home.    On 10/24/23, patient presented again due to left sided arm/leg weakness, paresthesias, imbalance, as well as back pain that started acutely in the middle of the night,     Since being in the hospital she has had a CTA brain/neck, dysphagia screen, CT brain, MRI head which were all without acute changes.      Labs/Imaging:    Labs:   Cr .90  GFR 84 AST 9 ALT 29  WBC 8.61 Hg 13.4 UA neg protein neg blood    CT angio brain/neck/ brain [id number]  IMPRESSION:  No proximal large vessel occlusion, stenosis, or evidence of dissection.No acute perfusion abnormalities.      CT Brain 10-22-23  IMPRESSION:  No acute intracranial hemorrhage, mass effect, or midline shift.    CT Angio Brain Neck Stroke protocol 10-24-23  HEAD CT: No evidence of an acute intracranial hemorhage, midline shift or   hydrocephalus.  NECK CTA: No hemodynamic significant narowing within the neck.  BRAIN CTA: No proximal large vessel occlusion.  CT PERFUSION: No areas of ischemia identified.    MRI head 10-25-23  Impression:  Unremarkable pre and postcontrast enhanced MRI of the brain    EEG  EEG 10/25: Findings maybe suggestive of focal cerebral dysfunction. No epileptiform activity was seen, no clinical events or seizures recorded.         PAST SURGICAL HISTORY:  rotator cuff surgery  appendectomy    FAMILY HISTORY:   No family hx of autoimmune disease          As per chart review:    This is a 38 year old female with hx of Hashimoto's hemachromatosis, nephrolithiasis who presented on 10/24/23 to  due to acute onset left sided arm/leg weakness, paresthesias, imbalance, as well as shooting back pain that started overnight    Patient reports since the second week of October she has been having sensation of congestion,fluid in her ear, blurry vision and headaches along with temp of . She went to [facility] due to worsening symptoms including low grade fevers, headaches,  neck pain, confusion, word finding difficulty, as well as pain in her right eye and ear along with photophobia and she was admitted to [facility] from 10/16-10/19/23. As per the chart, she had a lumbar puncture reportedly with pleocytosis and was treated for acute viral meningitis. She was given oral valcylovir on discharge to be completed on 10/27/23 and she was initially improving on this.  However, she had  acute right sided weakness in her arm and paresthesia on 10/22 and she presented to  at which time her CTA was without acute pathology and she was discharged home.    On 10/24/23, patient presented again due to left sided arm/leg weakness, paresthesias, imbalance, as well as back pain that started acutely in the middle of the night.     Since being in the hospital she has had a CTA brain/neck, CT brain, MRI head which were all without acute changes.    On bedside exam, patient reports overall improvement in her initial symptoms. She continues to have headaches intermittently, the back pain has improved, her left side arm and leg weakness per patient is improving although still there. She is able to ambulate without difficulty.   Patient reports no similar symptoms in the past, denies sick contacts.  Denies hx of  joint pain, joint swelling, rashes, pericarditis, pleuritis, oral ulcers, fevers, sicca symptoms, chest pain, cough, diarrhea, blood in stool, hematuria.      Labs/Imaging:    Labs:   Cr .90  GFR 84 AST 9 ALT 29  WBC 8.61 Hg 13.4 UA neg protein neg blood  ESR 8    CT angio brain/neck/ brain [id number]  IMPRESSION:  No proximal large vessel occlusion, stenosis, or evidence of dissection.No acute perfusion abnormalities.      CT Brain 10-22-23  IMPRESSION:  No acute intracranial hemorrhage, mass effect, or midline shift.    CT Angio Brain Neck Stroke protocol 10-24-23  HEAD CT: No evidence of an acute intracranial hemorhage, midline shift or   hydrocephalus.  NECK CTA: No hemodynamic significant narowing within the neck.  BRAIN CTA: No proximal large vessel occlusion.  CT PERFUSION: No areas of ischemia identified.    MRI head 10-25-23  Impression:  Unremarkable pre and postcontrast enhanced MRI of the brain    EEG  EEG 10/25: Findings maybe suggestive of focal cerebral dysfunction. No epileptiform activity was seen, no clinical events or seizures recorded.         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 She went to [facility] due to worsening symptoms including low grade fevers, headaches,  neck pain, confusion, word finding difficulty, as well as pain in her right eye and ear along with photophobia and she was admitted to [facility] from 10/16-10/19/23. As per the chart, she had a lumbar puncture reportedly with pleocytosis and was treated for acute viral meningitis. She was given oral valcylovir on discharge to be completed on 10/27/23 and she was initially improving on this.  However, she had  acute right sided weakness in her arm and paresthesia on 10/22 and she presented to  at which time her CTA was without acute pathology and she was discharged home.    On 10/24/23, patient presented again due to left sided arm/leg weakness, paresthesias, imbalance, as well as back pain that started acutely in the middle of the night.     Since being in the hospital she has had a CTA brain/neck, CT brain, MRI head which were all without acute changes.    On bedside exam, patient reports overall improvement in her initial symptoms. She continues to have headaches intermittently, the back pain has improved, her left side arm and leg weakness per patient is improving although still there. She is able to ambulate without difficulty.   Patient reports no similar symptoms in the past, denies sick contacts.  Denies hx of  joint pain, joint swelling, rashes, pericarditis, pleuritis, oral ulcers, fevers, sicca symptoms, chest pain, cough, diarrhea, blood in stool, hematuria.    Physical exam:   T(C): 37 (10-26-23 @ 07:20), Max: 37 (10-26-23 @ 07:20)  HR: 77 (10-26-23 @ 09:18) (63 - 77)  BP: 99/59 (10-26-23 @ 09:18) (98/58 - 99/59)  RR: 16 (10-26-23 @ 07:20) (16 - 17)  SpO2: 97% (10-26-23 @ 07:20) (97% - 98%)    CONSTITUTIONAL: Well groomed, no apparent distress  EYES: PERRLA and symmetric, EOMI, No conjunctival or scleral injection  ENMT: Oral mucosa with moist membranes. No oral ulcers. Normal dentition; no pharyngeal injection or exudates  RESP: CTA b/l,   CV: RRR, +S1S2, no MRG; no JVD; no peripheral edema  GI: Soft, NT, ND,  LYMPH: No cervical LAD or tenderness  MSK: No synovitis or joint tenderness, Normal ROM of upper and lower extremities   SKIN: No rashes noted  NEURO: No focal deficits, muscle strength in left arm and left leg slightly diminished (4/5), rest of strength testing 5/5   PSYCH: Appropriate insight/judgment    Labs/Imaging:    Labs:   Cr .90  GFR 84 AST 9 ALT 29  WBC 8.61 Hg 13.4 UA neg protein neg blood  ESR 8    CT angio brain/neck/ brain [id number]  IMPRESSION:  No proximal large vessel occlusion, stenosis, or evidence of dissection.No acute perfusion abnormalities.      CT Brain 10-22-23  IMPRESSION:  No acute intracranial hemorrhage, mass effect, or midline shift.    CT Angio Brain Neck Stroke protocol 10-24-23  HEAD CT: No evidence of an acute intracranial hemorhage, midline shift or   hydrocephalus.  NECK CTA: No hemodynamic significant narowing within the neck.  BRAIN CTA: No proximal large vessel occlusion.  CT PERFUSION: No areas of ischemia identified.    MRI head 10-25-23  Impression:  Unremarkable pre and postcontrast enhanced MRI of the brain    EEG  EEG 10/25: Findings maybe suggestive of focal cerebral dysfunction. No epileptiform activity was seen, no clinical events or seizures recorded.         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 She went to [facility] due to worsening symptoms including low grade fevers, headaches,  neck pain, confusion, word finding difficulty, as well as pain in her right eye and ear along with photophobia and she was admitted to [facility] from 10/16-10/19/23. As per the chart, she had a lumbar puncture reportedly with pleocytosis and was treated for acute viral meningitis. She was given oral valcylovir on discharge to be completed on 10/27/23 and she was initially improving on this.  However, she had  acute right sided weakness in her arm and paresthesia on 10/22 and she presented to  at which time her CTA was without acute pathology and she was discharged home.    On 10/24/23, patient presented again this time due to left sided arm/leg weakness, paresthesias, imbalance, as well as back pain that started acutely in the middle of the night.     Since being in the hospital she has had a CTA brain/neck, CT brain, MRI head which were all without acute changes.    On bedside exam, patient reports overall improvement in her initial symptoms. She continues to have headaches intermittently, the back pain has improved, her left side arm and leg weakness per patient is improving although still there. She is able to ambulate without difficulty.   Patient reports no similar symptoms in the past, denies sick contacts.  Denies hx of  joint pain, joint swelling, rashes, pericarditis, pleuritis, oral ulcers, fevers, sicca symptoms, chest pain, cough, diarrhea, blood in stool, hematuria.    Physical exam:   T(C): 37 (10-26-23 @ 07:20), Max: 37 (10-26-23 @ 07:20)  HR: 77 (10-26-23 @ 09:18) (63 - 77)  BP: 99/59 (10-26-23 @ 09:18) (98/58 - 99/59)  RR: 16 (10-26-23 @ 07:20) (16 - 17)  SpO2: 97% (10-26-23 @ 07:20) (97% - 98%)    CONSTITUTIONAL: Well groomed, no apparent distress  EYES: PERRLA and symmetric, EOMI, No conjunctival or scleral injection  ENMT: Oral mucosa with moist membranes. No oral ulcers. Normal dentition; no pharyngeal injection or exudates  RESP: CTA b/l,   CV: RRR, +S1S2, no MRG; no JVD; no peripheral edema  GI: Soft, NT, ND,  LYMPH: No cervical LAD or tenderness  MSK: No synovitis or joint tenderness, Normal ROM of upper and lower extremities   SKIN: No rashes noted  NEURO: No focal deficits, muscle strength in left arm and left leg slightly diminished (4/5), rest of strength testing 5/5   PSYCH: Appropriate insight/judgment    Labs/Imaging:    Labs:   Cr .90  GFR 84 AST 9 ALT 29  WBC 8.61 Hg 13.4 UA neg protein neg blood  ESR 8    CT angio brain/neck/ brain [id number]  IMPRESSION:  No proximal large vessel occlusion, stenosis, or evidence of dissection.No acute perfusion abnormalities.      CT Brain 10-22-23  IMPRESSION:  No acute intracranial hemorrhage, mass effect, or midline shift.    CT Angio Brain Neck Stroke protocol 10-24-23  HEAD CT: No evidence of an acute intracranial hemorhage, midline shift or   hydrocephalus.  NECK CTA: No hemodynamic significant narowing within the neck.  BRAIN CTA: No proximal large vessel occlusion.  CT PERFUSION: No areas of ischemia identified.    MRI head 10-25-23  Impression:  Unremarkable pre and postcontrast enhanced MRI of the brain    EEG  EEG 10/25: Findings maybe suggestive of focal cerebral dysfunction. No epileptiform activity was seen, no clinical events or seizures recorded.         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

## 2023-10-27 ENCOUNTER — TRANSCRIPTION ENCOUNTER (OUTPATIENT)
Age: 38
End: 2023-10-27

## 2023-10-27 VITALS
DIASTOLIC BLOOD PRESSURE: 54 MMHG | OXYGEN SATURATION: 96 % | RESPIRATION RATE: 17 BRPM | SYSTOLIC BLOOD PRESSURE: 90 MMHG | TEMPERATURE: 98 F | HEART RATE: 73 BPM

## 2023-10-27 LAB
DSDNA AB SER-ACNC: <12 IU/ML — SIGNIFICANT CHANGE UP
DSDNA AB SER-ACNC: <12 IU/ML — SIGNIFICANT CHANGE UP

## 2023-10-27 PROCEDURE — 99233 SBSQ HOSP IP/OBS HIGH 50: CPT

## 2023-10-27 PROCEDURE — 99239 HOSP IP/OBS DSCHRG MGMT >30: CPT

## 2023-10-27 RX ORDER — ACETAMINOPHEN 500 MG
2 TABLET ORAL
Qty: 0 | Refills: 0 | DISCHARGE
Start: 2023-10-27

## 2023-10-27 RX ADMIN — Medication 5 MILLIGRAM(S): at 06:00

## 2023-10-27 RX ADMIN — Medication 30 MILLIGRAM(S): at 06:00

## 2023-10-27 RX ADMIN — Medication 25 MICROGRAM(S): at 06:01

## 2023-10-27 RX ADMIN — VALACYCLOVIR 1000 MILLIGRAM(S): 500 TABLET, FILM COATED ORAL at 13:03

## 2023-10-27 RX ADMIN — Medication 25 MILLIGRAM(S): at 06:00

## 2023-10-27 RX ADMIN — VALACYCLOVIR 1000 MILLIGRAM(S): 500 TABLET, FILM COATED ORAL at 06:01

## 2023-10-27 RX ADMIN — Medication 100 GRAM(S): at 09:25

## 2023-10-27 NOTE — PROGRESS NOTE ADULT - SUBJECTIVE AND OBJECTIVE BOX
Chief Complaint: L sided weakness    Interval Hx: Patient seen and examined earlier today. L arm stronger today, essentially back to baseline. L leg is still weak but better than presentation. She had a headache this AM while undergoing MRI. No other complaints. No fever or rigors. No neck pain. MRI is reported, negative. EEG was done, maybe suggestive of focal cerebral dysfunction, no epileptiform activity seen and no clinical events or seizures observed.     ROS: Multi system review is comprehensively negative x 10 systems except as above    Vitals:  T(F): 97.6 (25 Oct 2023 10:00), Max: 98.9 (24 Oct 2023 20:23)  HR: 85 (25 Oct 2023 10:00) (72 - 85)  BP: 109/69 (25 Oct 2023 10:00) (99/42 - 109/69)  RR: 18 (25 Oct 2023 10:00) (18 - 18)  SpO2: 99% (25 Oct 2023 10:00) (98% - 99%) on room air    Exam:  Gen: Comfortable appearing  HEENT: NCAT PERRL EOMI MMM clear oropharynx  Neck: Supple, no JVD, no LAD  CVS: s1 s2 normal RRR  Chest: Normal resp effort, lungs CTA B/L  Abd: +BS, soft NT ND   Ext: No edema or calf tenderness  Skin: Warm, dry  Mood: Calm, pleasant  Neuro: AOx3, normal naming, repetition and fluency, CN intact, normal bulk and tone, no drift, strength 5/5 throughout to confrontation in upper extremities,  4+/5 L hip flexor and knee extensor. Sensory intact symmetrically to light touch throughout. Normal FNF.    Labs:                      13.4   8.61 )-----------( 314              38.0       140  |  109  |  10  -----------------------< 102  4.1   |   25   |  0.90    Ca 8.4    TPro  7.9  /  Alb  4.0  /  TBili  0.5  /  DBili  x   /  AST  7  /  ALT  23  /  AlkPhos  62     PT: 11.2 sec;   INR: 0.99 ratio  PTT: 30.6 sec    Troponin negative    a1c 5.6       HCG < 1    UA yellow clear, N-, LE-,     Micro:  RVP PCR negative  COVID19 PCR negative    Imaging:  MR brain WWO 10/25: Unremarkable pre and postcontrast enhanced MRI of the brain    CTA head and neck w/ IV cont 10/24: No proximal large vessel occlusion. No hemodynamic significant narrowing within the neck.    CT brain perfusion 10/24: No areas of ischemia identified    CT head WO 10/24: No evidence of an acute intracranial hemorrhage midline shift or   hydrocephalus.    CXR 10/24: Cardiomediastinal silhouette is normal and the ian are not enlarged. The trachea is midline. There is no focal lung consolidation or sizable pleural effusion. No significant osseous abnormality.    Prior visit imaging  CTA head and neck 10/22: No proximal large vessel occlusion, stenosis, or evidence of dissection.    CT brain perfusion 10/22: No perfusion abnormalities    CT head WO 10/22: No acute intracranial hemorrhage, mass effect, or midline shift.     Cardiac Testing:  EKG 10/24: NSR , rate WNL, normal EKG    Additional Testing:  EEG 10/25: Findings maybe suggestive of focal cerebral dysfunction. No epileptiform activity was seen and no clinical events or seizures were recorded.     Meds:  MEDICATIONS  (STANDING):  atorvastatin 40 milliGRAM(s) Oral at bedtime  influenza   Vaccine 0.5 milliLiter(s) IntraMuscular once  levothyroxine 25 MICROGram(s) Oral daily  ondansetron Injectable 4 milliGRAM(s) IV Push once  valACYclovir 1000 milliGRAM(s) Oral three times a day    MEDICATIONS  (PRN):  acetaminophen     Tablet .. 650 milliGRAM(s) Oral every 6 hours PRN Temp greater or equal to 38C (100.4F)  ondansetron Injectable 4 milliGRAM(s) IV Push every 6 hours PRN Nausea and/or Vomiting                                  
Chief Complaint: L sided weakness    Interval Hx: Patient seen and examined earlier today. This AM she had a headache, some associated dizziness and recurrence of her L leg weakness, all gradually abated with IV fluid hydration, acetaminophen and a cold pack. MRI is reported, negative. EEG negative for epileptiform activity and no clinical events or seizures observed. She has provided additional hx that her mother suffers from migraines. Additionally, one year ago around this time, her father passed away, and the trip to [place] was in memory of that. Started patient on trial of migraine cocktail after further discussion with Neurology.     ROS: Multi system review is comprehensively negative x 10 systems except as above    Vitals:  T(F): 98.6 (26 Oct 2023 07:20), Max: 98.6 (26 Oct 2023 07:20)  HR: 77 (26 Oct 2023 09:18) (63 - 77)  BP: 99/59 (26 Oct 2023 09:18) (98/58 - 99/59)  RR: 16 (26 Oct 2023 07:20) (16 - 17)  SpO2: 97% (26 Oct 2023 07:20) (97% - 98%) on room air    Exam:  Gen: Comfortable appearing  HEENT: NCAT PERRL EOMI MMM clear oropharynx  Neck: Supple, no JVD, no LAD  CVS: s1 s2 normal RRR  Chest: Normal resp effort, lungs CTA B/L  Abd: +BS, soft NT ND   Ext: No edema or calf tenderness  Skin: Warm, dry  Mood: Calm, pleasant  Neuro: AOx3, normal naming, repetition and fluency, CN intact, normal bulk and tone, no drift, strength 5/5 throughout to confrontation in upper extremities,  4+/5 L hip flexor and knee extensor. Sensory intact symmetrically to light touch throughout. Normal FNF.    Labs:                      13.4   8.61 )-----------( 314              38.0       140  |  109  |  10  -----------------------< 102  4.1   |   25   |  0.90    Ca 8.4    TPro  7.9  /  Alb  4.0  /  TBili  0.5  /  DBili  x   /  AST  7  /  ALT  23  /  AlkPhos  62     PT: 11.2 sec;   INR: 0.99 ratio  PTT: 30.6 sec    Troponin negative    a1c 5.6       HCG < 1    ESR 8  CRP in process   RF negative  KAMINI, DSDNA, C3, C4, ANCA in process, ACE requested    UA yellow clear, N-, LE-,     Micro:  RVP PCR negative  COVID19 PCR negative    Imaging:  MR brain WWO 10/25: Unremarkable pre and postcontrast enhanced MRI of the brain    CTA head and neck w/ IV cont 10/24: No proximal large vessel occlusion. No hemodynamic significant narrowing within the neck.    CT brain perfusion 10/24: No areas of ischemia identified    CT head WO 10/24: No evidence of an acute intracranial hemorrhage midline shift or   hydrocephalus.    CXR 10/24: Cardiomediastinal silhouette is normal and the ian are not enlarged. The trachea is midline. There is no focal lung consolidation or sizable pleural effusion. No significant osseous abnormality.    Prior visit imaging  CTA head and neck 10/22: No proximal large vessel occlusion, stenosis, or evidence of dissection.    CT brain perfusion 10/22: No perfusion abnormalities    CT head WO 10/22: No acute intracranial hemorrhage, mass effect, or midline shift.     Cardiac Testing:  EKG 10/24: NSR , rate WNL, normal EKG    Additional Testing:  EEG 10/25: Findings maybe suggestive of focal cerebral dysfunction. No epileptiform activity was seen and no clinical events or seizures were recorded.     Meds:  MEDICATIONS  (STANDING):  atorvastatin 40 milliGRAM(s) Oral at bedtime  diphenhydrAMINE Injectable 25 milliGRAM(s) IV Push every 8 hours  influenza   Vaccine 0.5 milliLiter(s) IntraMuscular once  ketorolac   Injectable 30 milliGRAM(s) IV Push every 8 hours  levothyroxine 25 MICROGram(s) Oral daily  magnesium sulfate  IVPB 1 Gram(s) IV Intermittent every 12 hours  metoclopramide Injectable 5 milliGRAM(s) IV Push every 8 hours  ondansetron Injectable 4 milliGRAM(s) IV Push once  valACYclovir 1000 milliGRAM(s) Oral three times a day    MEDICATIONS  (PRN):  acetaminophen     Tablet .. 650 milliGRAM(s) Oral every 6 hours PRN Temp greater or equal to 38C (100.4F)  ondansetron Injectable 4 milliGRAM(s) IV Push every 6 hours PRN Nausea and/or Vomiting                                    
I saw and examined the patient at bedside.     She reports the L arm is much stronger than yesterday, as is the L leg.  The arm is essential back to baseline, and the leg still has trace weakness she notices when she walks.     She had a headache overnight, and this AM while undergoing MRI.      On exam:   GEN; NAD  CV: rRR, S1, S2  PULM: CTAB  NEURO:   Awake, alert, oriented to month, date, year, hospital, normal naming, repetition, and fluency  Pupils 3-2mm, symmetric, full Vf's, no papilledema, full eye movements, no nystagmus, no facial weakness, no dysarthria, tongue midline, palate symmetric.   MOTOR: normal bulk and tone, no drift.  5/5 throughout to confrontation in upper extremities.  4+/5 L hip flexor, and knee extensor.   SENSORY: inatct symmetrically to light touch throughout  COORDINATION: normal FNF    MRI brain images reviewed: no diffusion restriction.  no abnormal enhancment or FLAI rhyperintensities.     
I saw and examined the patient at the bedside.     She received IV migraine medications over last 24h, and reports that she feels much better.  She has not had any headache this AM.  She does feel slightly fatigued from the medications, but no other complaints at this time.  no return of the numbness or weakness in the L arm.       On exam:   GEN; NAD  CV: rRR, S1, S2  PULM: CTAB  NEURO:   Awake, alert, oriented to month, date, year, hospital, normal naming, repetition, and fluency  Pupils 3-2mm, symmetric, full Vf's, no papilledema, full eye movements, no nystagmus, no facial weakness, no dysarthria, tongue midline, palate symmetric.   MOTOR: normal bulk and tone, no drift.  5/5 throughout to confrontation in upper and lower extremities.    SENSORY: inatct symmetrically to light touch throughout  COORDINATION: normal FNF    MRI brain images reviewed: no diffusion restriction.  no abnormal enhancment or FLAIR hyperintensities.   EEG: l temporal slowing.  No epileptiform activity.

## 2023-10-27 NOTE — DISCHARGE NOTE PROVIDER - NSDCMRMEDTOKEN_GEN_ALL_CORE_FT
levothyroxine 25 mcg (0.025 mg) oral tablet: 1 tab(s) orally once a day  ondansetron 4 mg oral tablet, disintegratin tab(s) orally every 6 hours as needed for  nausea  valACYclovir 1 g oral tablet: 1 tab(s) orally 3 times a day   acetaminophen 325 mg oral tablet: 2 tab(s) orally every 6 hours as needed for As needed for pain  levothyroxine 25 mcg (0.025 mg) oral tablet: 1 tab(s) orally once a day  ondansetron 4 mg oral tablet, disintegratin tab(s) orally every 6 hours as needed for  nausea  valACYclovir 1 g oral tablet: 1 tab(s) orally 3 times a day Take for 1 more day to complete the course and then stop.

## 2023-10-27 NOTE — DISCHARGE NOTE PROVIDER - NSDCCPCAREPLAN_GEN_ALL_CORE_FT
PRINCIPAL DISCHARGE DIAGNOSIS  Diagnosis: Left-sided weakness  Assessment and Plan of Treatment: You were admitted to the hospital with L sided sensory loss, weakness, headache. Reviewed records from [facility]. Appreciate input from Neurology. MRI brain negative. No evidence for stroke, bleed, mass or other structural neurological finding. EEG done, no epileptiform activity was seen and no clinical events or seizures were recorded. Alternative diagnosis such as migraine phenomenon seems mosly likely etiology. Additionally, unclear whether the trip to [facility] [place] 1 year from the time of your father's passing had any role in the onset of your symptoms. You were treated with a cocktail of migraine medications and improved thereafter. Continue valacyclovir 1 more day to complete course of treatment for recent suspected viral meningitis (lymphocytic pleocytosis in CSF). Given your hx of Hashimoto's, sent off autoimmune workup and requested input from Rheumatology. Inflammatory markers were normal and Rheumatology doubts autoimmune etiology. Migraine diagnosis does seem most likely. Follow up with Neurology as outpatient as needed. Referral to Dr Gabo Alonzo provided.

## 2023-10-27 NOTE — PROGRESS NOTE ADULT - REASON FOR ADMISSION
L sided weakness
L sided weakness
left sided weakness and concern for stroke
left sided weakness and concern for stroke

## 2023-10-27 NOTE — DISCHARGE NOTE PROVIDER - CARE PROVIDER_API CALL
Freddy Hernandez  Internal Medicine  [address] [place] [id number]  Phone: [phone]  Fax: [phone]  Follow Up Time: 2 weeks    Gabo Alonzo  Neurology  [address] [place] [address] [address]  [place] [address]  Phone: [phone]  Fax: [phone]  Follow Up Time: 1 month

## 2023-10-27 NOTE — DISCHARGE NOTE PROVIDER - PROVIDER TOKENS
PROVIDER:[TOKEN:[[id number]],FOLLOWUP:[2 weeks]],PROVIDER:[TOKEN:[[id number]],FOLLOWUP:[1 month]]

## 2023-10-27 NOTE — DISCHARGE NOTE PROVIDER - HOSPITAL COURSE
38 year old woman hx of Hashimoto's thyroiditis, hypothyroidism, hemochromatosis was in her usual state of health until approximately 10 days prior to presentation, developed fever, headache and [place] , was admitted to [facility], underwent extensive testing including neuroimaging, LP, etc and was diagnosed with suspected viral meningitis, treated with antiviral medication and clinically improved thereafter, discharged home 10/18. On 10/22 she woke up with numbness and tingling on the right arm with radiation up to the face and weakness on the right side. She was seen here in  ED, had CT head, CT brain stroke protocol and CTA head and neck, normal, discharged home thereafter. Returned 10/24 with now L sided sensory loss, weakness. CT head, brain perfusion and CTA head and neck repeated, again normal. Patient was continued on her valacyclovir, ordered for MRI WWO, EEG, admitted to Medicine    L sided sensory loss, weakness  Reviewed records from [facility]. Appreciate input from Neurology. No evidence for stroke or TIA. Symptom duration >12 hours. MR brain negative. EEG done, no epileptiform activity was seen and no clinical events or seizures were recorded. Alternative diagnosis migraine phenomenon as her mother does have history of migraine with somatosensory and motor symptoms. Also, patient's trip to [place] was marking 1 year from the time of her father's passing. Discussed case further with Neurology. Started patient on trial of migraine treatment and she improved. Continue valacyclovir 1 more day to complete course of treatment for recent suspected viral meningitis (lymphocytic pleocytosis in CSF, other testing such as CSF HSV PCR was negative). Given the unclear nature of her diagnosis and her hx of Hashimoto's, sent off autoimmune workup and requested input from Rheumatology. Inflammatory markers were normal and Rheumatology doubts autoimmune etiology. Migraine diagnosis seems most likely. Follow up with Neurology as outpatient as needed. Referral to Dr Gabo Alonzo provided.    Hypothyroidism  Continue levothyroxine      Discharge Exam:  Afebrile  98/50  HR 70  RR 16  O2 99% on RA  Gen: Comfortable appearing  HEENT: NCAT PERRL EOMI MMM clear oropharynx  Neck: Supple, no JVD, no LAD  CVS: s1 s2 normal RRR  Chest: Normal resp effort, lungs CTA B/L  Abd: +BS, soft NT ND   Ext: No edema or calf tenderness  Skin: Warm, dry  Mood: Calm, pleasant  Neuro: AOx3, normal naming, repetition and fluency, CN intact, normal bulk and tone, no drift, strength 5/5 throughout, sensation intact, no FNFA, gait steady    Labs:                      13.4   8.61 )-----------( 314              38.0       140  |  109  |  10  -----------------------< 102  4.1   |   25   |  0.90    Ca 8.4    TPro  7.9  /  Alb  4.0  /  TBili  0.5  /  DBili  x   /  AST  7  /  ALT  23  /  AlkPhos  62     PT: 11.2 sec;   INR: 0.99 ratio  PTT: 30.6 sec    Troponin negative    a1c 5.6       HCG < 1    ESR 8  CRP normal   RF negative  KAMINI, DSDNA, C3, C4, ANCA in process    UA yellow clear, N-, LE-,     Micro:  RVP PCR negative  COVID19 PCR negative    Imaging:  MR brain WWO 10/25: Unremarkable pre and postcontrast enhanced MRI of the brain    CTA head and neck w/ IV cont 10/24: No proximal large vessel occlusion. No hemodynamic significant narrowing within the neck.    CT brain perfusion 10/24: No areas of ischemia identified    CT head WO 10/24: No evidence of an acute intracranial hemorrhage midline shift or   hydrocephalus.    CXR 10/24: Cardiomediastinal silhouette is normal and the ian are not enlarged. The trachea is midline. There is no focal lung consolidation or sizable pleural effusion. No significant osseous abnormality.    Prior visit imaging  CTA head and neck 10/22: No proximal large vessel occlusion, stenosis, or evidence of dissection.    CT brain perfusion 10/22: No perfusion abnormalities    CT head WO 10/22: No acute intracranial hemorrhage, mass effect, or midline shift.     Cardiac Testing:  EKG 10/24: NSR , rate WNL, normal EKG    Additional Testing:  EEG 10/25: Findings maybe suggestive of focal cerebral dysfunction. No epileptiform activity was seen and no clinical events or seizures were recorded.

## 2023-10-27 NOTE — DISCHARGE NOTE PROVIDER - NSDCCAREPROVSEEN_GEN_ALL_CORE_FT
Cristina Lacy (Neurology)  Neo Myrick (Medicine)  Gabo Alonzo (Neurology)  Milton Baum (Medicine)  Too Mack (Speech and Language Pathology)  Pierre Bowman (Physiatry)

## 2023-10-27 NOTE — PROGRESS NOTE ADULT - ASSESSMENT
38 year old woman with recent viral meningitis, hashimoto's disease, hypothyroidism, presenting on 10/24 with L sided sensory loss, weakness, chest pain, and shortness of breath, in setting of recent LP, diagnosis and treatment of viral meningitis.  Symptoms now resolved.  On exam, has NIHSS of 0 today.  MRI without any pathology.  The EEG shows L temporal slowing.    Suspect migraine, possible triggered by recent meningitis. Symptoms resolved with mag/toradol/benadryl/reglan.  May need repeat EEG in future to reassess the L temporal slowing.    -no further inpatient neurology recommendations anticipated at this point.  Please page or call with any acute issues we can help address.

## 2023-10-30 LAB
ANA TITR SER: NEGATIVE — SIGNIFICANT CHANGE UP
ANA TITR SER: NEGATIVE — SIGNIFICANT CHANGE UP

## 2023-10-31 ENCOUNTER — EMERGENCY (EMERGENCY)
Facility: HOSPITAL | Age: 38
LOS: 0 days | Discharge: HOME | End: 2023-11-01
Attending: EMERGENCY MEDICINE
Payer: COMMERCIAL

## 2023-10-31 VITALS
DIASTOLIC BLOOD PRESSURE: 61 MMHG | HEART RATE: 78 BPM | OXYGEN SATURATION: 100 % | TEMPERATURE: 99 F | RESPIRATION RATE: 18 BRPM | SYSTOLIC BLOOD PRESSURE: 107 MMHG

## 2023-10-31 DIAGNOSIS — R20.2 PARESTHESIA OF SKIN: ICD-10-CM

## 2023-10-31 DIAGNOSIS — E03.9 HYPOTHYROIDISM, UNSPECIFIED: ICD-10-CM

## 2023-10-31 DIAGNOSIS — Z88.0 ALLERGY STATUS TO PENICILLIN: ICD-10-CM

## 2023-10-31 DIAGNOSIS — R20.0 ANESTHESIA OF SKIN: ICD-10-CM

## 2023-10-31 DIAGNOSIS — M54.9 DORSALGIA, UNSPECIFIED: ICD-10-CM

## 2023-10-31 DIAGNOSIS — R07.9 CHEST PAIN, UNSPECIFIED: ICD-10-CM

## 2023-10-31 DIAGNOSIS — A87.9 VIRAL MENINGITIS, UNSPECIFIED: ICD-10-CM

## 2023-10-31 DIAGNOSIS — Z79.890 HORMONE REPLACEMENT THERAPY: ICD-10-CM

## 2023-10-31 DIAGNOSIS — G43.909 MIGRAINE, UNSPECIFIED, NOT INTRACTABLE, WITHOUT STATUS MIGRAINOSUS: ICD-10-CM

## 2023-10-31 DIAGNOSIS — R29.702 NIHSS SCORE 2: ICD-10-CM

## 2023-10-31 DIAGNOSIS — R53.1 WEAKNESS: ICD-10-CM

## 2023-10-31 DIAGNOSIS — E83.119 HEMOCHROMATOSIS, UNSPECIFIED: ICD-10-CM

## 2023-10-31 LAB
ANION GAP SERPL CALC-SCNC: 7 MMOL/L — SIGNIFICANT CHANGE UP (ref 5–17)
ANION GAP SERPL CALC-SCNC: 7 MMOL/L — SIGNIFICANT CHANGE UP (ref 5–17)
BASOPHILS # BLD AUTO: 0.05 K/UL — SIGNIFICANT CHANGE UP (ref 0–0.2)
BASOPHILS # BLD AUTO: 0.05 K/UL — SIGNIFICANT CHANGE UP (ref 0–0.2)
BASOPHILS NFR BLD AUTO: 0.5 % — SIGNIFICANT CHANGE UP (ref 0–2)
BASOPHILS NFR BLD AUTO: 0.5 % — SIGNIFICANT CHANGE UP (ref 0–2)
BUN SERPL-MCNC: 10 MG/DL — SIGNIFICANT CHANGE UP (ref 7–23)
BUN SERPL-MCNC: 10 MG/DL — SIGNIFICANT CHANGE UP (ref 7–23)
CALCIUM SERPL-MCNC: 9.2 MG/DL — SIGNIFICANT CHANGE UP (ref 8.5–10.1)
CALCIUM SERPL-MCNC: 9.2 MG/DL — SIGNIFICANT CHANGE UP (ref 8.5–10.1)
CHLORIDE SERPL-SCNC: 108 MMOL/L — SIGNIFICANT CHANGE UP (ref 96–108)
CHLORIDE SERPL-SCNC: 108 MMOL/L — SIGNIFICANT CHANGE UP (ref 96–108)
CK SERPL-CCNC: 61 U/L — SIGNIFICANT CHANGE UP (ref 26–192)
CK SERPL-CCNC: 61 U/L — SIGNIFICANT CHANGE UP (ref 26–192)
CO2 SERPL-SCNC: 24 MMOL/L — SIGNIFICANT CHANGE UP (ref 22–31)
CO2 SERPL-SCNC: 24 MMOL/L — SIGNIFICANT CHANGE UP (ref 22–31)
CREAT SERPL-MCNC: 0.88 MG/DL — SIGNIFICANT CHANGE UP (ref 0.5–1.3)
CREAT SERPL-MCNC: 0.88 MG/DL — SIGNIFICANT CHANGE UP (ref 0.5–1.3)
EGFR: 86 ML/MIN/1.73M2 — SIGNIFICANT CHANGE UP
EGFR: 86 ML/MIN/1.73M2 — SIGNIFICANT CHANGE UP
EOSINOPHIL # BLD AUTO: 0.17 K/UL — SIGNIFICANT CHANGE UP (ref 0–0.5)
EOSINOPHIL # BLD AUTO: 0.17 K/UL — SIGNIFICANT CHANGE UP (ref 0–0.5)
EOSINOPHIL NFR BLD AUTO: 1.7 % — SIGNIFICANT CHANGE UP (ref 0–6)
EOSINOPHIL NFR BLD AUTO: 1.7 % — SIGNIFICANT CHANGE UP (ref 0–6)
GLUCOSE SERPL-MCNC: 109 MG/DL — HIGH (ref 70–99)
GLUCOSE SERPL-MCNC: 109 MG/DL — HIGH (ref 70–99)
HCG SERPL-ACNC: <1 MIU/ML — SIGNIFICANT CHANGE UP
HCG SERPL-ACNC: <1 MIU/ML — SIGNIFICANT CHANGE UP
HCT VFR BLD CALC: 39.2 % — SIGNIFICANT CHANGE UP (ref 34.5–45)
HCT VFR BLD CALC: 39.2 % — SIGNIFICANT CHANGE UP (ref 34.5–45)
HGB BLD-MCNC: 14 G/DL — SIGNIFICANT CHANGE UP (ref 11.5–15.5)
HGB BLD-MCNC: 14 G/DL — SIGNIFICANT CHANGE UP (ref 11.5–15.5)
IMM GRANULOCYTES NFR BLD AUTO: 0.2 % — SIGNIFICANT CHANGE UP (ref 0–0.9)
IMM GRANULOCYTES NFR BLD AUTO: 0.2 % — SIGNIFICANT CHANGE UP (ref 0–0.9)
LYMPHOCYTES # BLD AUTO: 3.15 K/UL — SIGNIFICANT CHANGE UP (ref 1–3.3)
LYMPHOCYTES # BLD AUTO: 3.15 K/UL — SIGNIFICANT CHANGE UP (ref 1–3.3)
LYMPHOCYTES # BLD AUTO: 32.2 % — SIGNIFICANT CHANGE UP (ref 13–44)
LYMPHOCYTES # BLD AUTO: 32.2 % — SIGNIFICANT CHANGE UP (ref 13–44)
MCHC RBC-ENTMCNC: 31 PG — SIGNIFICANT CHANGE UP (ref 27–34)
MCHC RBC-ENTMCNC: 31 PG — SIGNIFICANT CHANGE UP (ref 27–34)
MCHC RBC-ENTMCNC: 35.7 GM/DL — SIGNIFICANT CHANGE UP (ref 32–36)
MCHC RBC-ENTMCNC: 35.7 GM/DL — SIGNIFICANT CHANGE UP (ref 32–36)
MCV RBC AUTO: 86.7 FL — SIGNIFICANT CHANGE UP (ref 80–100)
MCV RBC AUTO: 86.7 FL — SIGNIFICANT CHANGE UP (ref 80–100)
MONOCYTES # BLD AUTO: 0.47 K/UL — SIGNIFICANT CHANGE UP (ref 0–0.9)
MONOCYTES # BLD AUTO: 0.47 K/UL — SIGNIFICANT CHANGE UP (ref 0–0.9)
MONOCYTES NFR BLD AUTO: 4.8 % — SIGNIFICANT CHANGE UP (ref 2–14)
MONOCYTES NFR BLD AUTO: 4.8 % — SIGNIFICANT CHANGE UP (ref 2–14)
MPO AB + PR3 PNL SER: SIGNIFICANT CHANGE UP
MPO AB + PR3 PNL SER: SIGNIFICANT CHANGE UP
NEUTROPHILS # BLD AUTO: 5.92 K/UL — SIGNIFICANT CHANGE UP (ref 1.8–7.4)
NEUTROPHILS # BLD AUTO: 5.92 K/UL — SIGNIFICANT CHANGE UP (ref 1.8–7.4)
NEUTROPHILS NFR BLD AUTO: 60.6 % — SIGNIFICANT CHANGE UP (ref 43–77)
NEUTROPHILS NFR BLD AUTO: 60.6 % — SIGNIFICANT CHANGE UP (ref 43–77)
PLATELET # BLD AUTO: 325 K/UL — SIGNIFICANT CHANGE UP (ref 150–400)
PLATELET # BLD AUTO: 325 K/UL — SIGNIFICANT CHANGE UP (ref 150–400)
POTASSIUM SERPL-MCNC: 3.8 MMOL/L — SIGNIFICANT CHANGE UP (ref 3.5–5.3)
POTASSIUM SERPL-MCNC: 3.8 MMOL/L — SIGNIFICANT CHANGE UP (ref 3.5–5.3)
POTASSIUM SERPL-SCNC: 3.8 MMOL/L — SIGNIFICANT CHANGE UP (ref 3.5–5.3)
POTASSIUM SERPL-SCNC: 3.8 MMOL/L — SIGNIFICANT CHANGE UP (ref 3.5–5.3)
RBC # BLD: 4.52 M/UL — SIGNIFICANT CHANGE UP (ref 3.8–5.2)
RBC # BLD: 4.52 M/UL — SIGNIFICANT CHANGE UP (ref 3.8–5.2)
RBC # FLD: 12.2 % — SIGNIFICANT CHANGE UP (ref 10.3–14.5)
RBC # FLD: 12.2 % — SIGNIFICANT CHANGE UP (ref 10.3–14.5)
SODIUM SERPL-SCNC: 139 MMOL/L — SIGNIFICANT CHANGE UP (ref 135–145)
SODIUM SERPL-SCNC: 139 MMOL/L — SIGNIFICANT CHANGE UP (ref 135–145)
WBC # BLD: 9.78 K/UL — SIGNIFICANT CHANGE UP (ref 3.8–10.5)
WBC # BLD: 9.78 K/UL — SIGNIFICANT CHANGE UP (ref 3.8–10.5)
WBC # FLD AUTO: 9.78 K/UL — SIGNIFICANT CHANGE UP (ref 3.8–10.5)
WBC # FLD AUTO: 9.78 K/UL — SIGNIFICANT CHANGE UP (ref 3.8–10.5)

## 2023-10-31 PROCEDURE — 85025 COMPLETE CBC W/AUTO DIFF WBC: CPT

## 2023-10-31 PROCEDURE — 82550 ASSAY OF CK (CPK): CPT

## 2023-10-31 PROCEDURE — 99285 EMERGENCY DEPT VISIT HI MDM: CPT

## 2023-10-31 PROCEDURE — 99284 EMERGENCY DEPT VISIT MOD MDM: CPT | Mod: 25

## 2023-10-31 PROCEDURE — 84702 CHORIONIC GONADOTROPIN TEST: CPT

## 2023-10-31 PROCEDURE — 96375 TX/PRO/DX INJ NEW DRUG ADDON: CPT

## 2023-10-31 PROCEDURE — 96374 THER/PROPH/DIAG INJ IV PUSH: CPT

## 2023-10-31 PROCEDURE — 80048 BASIC METABOLIC PNL TOTAL CA: CPT

## 2023-10-31 PROCEDURE — 36415 COLL VENOUS BLD VENIPUNCTURE: CPT

## 2023-10-31 RX ORDER — DIPHENHYDRAMINE HCL 50 MG
50 CAPSULE ORAL ONCE
Refills: 0 | Status: COMPLETED | OUTPATIENT
Start: 2023-10-31 | End: 2023-10-31

## 2023-10-31 RX ORDER — KETOROLAC TROMETHAMINE 30 MG/ML
30 SYRINGE (ML) INJECTION ONCE
Refills: 0 | Status: DISCONTINUED | OUTPATIENT
Start: 2023-10-31 | End: 2023-10-31

## 2023-10-31 RX ORDER — METOCLOPRAMIDE HCL 10 MG
10 TABLET ORAL ONCE
Refills: 0 | Status: COMPLETED | OUTPATIENT
Start: 2023-10-31 | End: 2023-10-31

## 2023-10-31 RX ORDER — MAGNESIUM SULFATE 500 MG/ML
2 VIAL (ML) INJECTION ONCE
Refills: 0 | Status: COMPLETED | OUTPATIENT
Start: 2023-10-31 | End: 2023-10-31

## 2023-10-31 RX ORDER — METOCLOPRAMIDE HCL 10 MG
10 TABLET ORAL ONCE
Refills: 0 | Status: DISCONTINUED | OUTPATIENT
Start: 2023-10-31 | End: 2023-10-31

## 2023-10-31 RX ADMIN — Medication 150 GRAM(S): at 23:37

## 2023-10-31 RX ADMIN — Medication 10 MILLIGRAM(S): at 23:05

## 2023-10-31 RX ADMIN — Medication 50 MILLIGRAM(S): at 23:05

## 2023-10-31 RX ADMIN — Medication 30 MILLIGRAM(S): at 23:02

## 2023-10-31 NOTE — ED PROVIDER NOTE - CLINICAL SUMMARY MEDICAL DECISION MAKING FREE TEXT BOX
Long conversation with the family and patient I reviewed all her me recent medical work-up although she is complaining of numbness she has no sensory deficits on my examination I do agree with this is probably a complex migraine consistent with her negative work-up but with negative MRI CTAs and after reviewing her recent neurology rheumatology and internal medicine notes we will treat for complex migraine and reevaluate patient her NIHSS 0 patient and patient's family at bedside agree to current plan of care in my clinical expertise it was not beneficial to rescan this patient based off her negative recent work-up exact same symptoms risk of radiation far outweighs risk of obtaining a new diagnosis based of her presentation and recent work-up

## 2023-10-31 NOTE — ED PROVIDER NOTE - NSFOLLOWUPINSTRUCTIONS_ED_ALL_ED_FT
return to ed for intractable HA, persistent vomiting, or new onset motor/sensory deficits Patient Name: MORE ANDREW  Caregiver: Juan Too LEESA  Migraine Headache    A migraine headache is an intense, throbbing pain on one side or both sides of the head. Migraine headaches may also cause other symptoms, such as nausea, vomiting, and sensitivity to light and noise. A migraine headache can last from 4 hours to 3 days. Talk with your doctor about what things may bring on (trigger) your migraine headaches.    What are the causes?  The exact cause of this condition is not known. However, a migraine may be caused when nerves in the brain become irritated and release chemicals that cause inflammation of blood vessels. This inflammation causes pain. This condition may be triggered or caused by:    Drinking alcohol.  Smoking.  Taking medicines, such as:    Medicine used to treat chest pain (nitroglycerin).  Birth control pills.  Estrogen.  Certain blood pressure medicines.  Eating or drinking products that contain nitrates, glutamate, aspartame, or tyramine. Aged cheeses, chocolate, or caffeine may also be triggers.  Doing physical activity.    Other things that may trigger a migraine headache include:    Menstruation.  Pregnancy.  Hunger.  Stress.  Lack of sleep or too much sleep.  Weather changes.  Fatigue.    What increases the risk?  The following factors may make you more likely to experience migraine headaches:    Being a certain age. This condition is more common in people who are 25–55 years old.  Being female.  Having a family history of migraine headaches.  Being .  Having a mental health condition, such as depression or anxiety.  Being obese.    What are the signs or symptoms?  The main symptom of this condition is pulsating or throbbing pain. This pain may:    Happen in any area of the head, such as on one side or both sides.  Interfere with daily activities.  Get worse with physical activity.  Get worse with exposure to bright lights or loud noises.    Other symptoms may include:    Nausea.  Vomiting.  Dizziness.  General sensitivity to bright lights, loud noises, or smells.    Before you get a migraine headache, you may get warning signs (an aura). An aura may include:    Seeing flashing lights or having blind spots.  Seeing bright spots, halos, or zigzag lines.  Having tunnel vision or blurred vision.  Having numbness or a tingling feeling.  Having trouble talking.  Having muscle weakness.    Some people have symptoms after a migraine headache (postdromal phase), such as:    Feeling tired.  Difficulty concentrating.    How is this diagnosed?  A migraine headache can be diagnosed based on:    Your symptoms.  A physical exam.  Tests, such as:     CT scan or an MRI of the head. These imaging tests can help rule out other causes of headaches.  Taking fluid from the spine (lumbar puncture) and analyzing it (cerebrospinal fluid analysis, or CSF analysis).    How is this treated?  This condition may be treated with medicines that:    Relieve pain.  Relieve nausea.  Prevent migraine headaches.    Treatment for this condition may also include:    Acupuncture.  Lifestyle changes like avoiding foods that trigger migraine headaches.  Biofeedback.  Cognitive behavioral therapy.    Follow these instructions at home:      Medicines    Take over-the-counter and prescription medicines only as told by your health care provider.  Ask your health care provider if the medicine prescribed to you:    Requires you to avoid driving or using heavy machinery.  Can cause constipation. You may need to take these actions to prevent or treat constipation:    Drink enough fluid to keep your urine pale yellow.  Take over-the-counter or prescription medicines.  Eat foods that are high in fiber, such as beans, whole grains, and fresh fruits and vegetables.  Limit foods that are high in fat and processed sugars, such as fried or sweet foods.        Lifestyle    Do not drink alcohol.  Do not use any products that contain nicotine or tobacco, such as cigarettes, e-cigarettes, and chewing tobacco. If you need help quitting, ask your health care provider.  Get at least 8 hours of sleep every night.  Find ways to manage stress, such as meditation, deep breathing, or yoga.        General instructions      Keep a journal to find out what may trigger your migraine headaches. For example, write down:    What you eat and drink.  How much sleep you get.  Any change to your diet or medicines.  If you have a migraine headache:    Avoid things that make your symptoms worse, such as bright lights.  It may help to lie down in a dark, quiet room.  Do not drive or use heavy machinery.  Ask your health care provider what activities are safe for you while you are experiencing symptoms.  Keep all follow-up visits as told by your health care provider. This is important.    Contact a health care provider if:  You develop symptoms that are different or more severe than your usual migraine headache symptoms.  You have more than 15 headache days in one month.    Get help right away if:  Your migraine headache becomes severe.  Your migraine headache lasts longer than 72 hours.  You have a fever.  You have a stiff neck.  You have vision loss.  Your muscles feel weak or like you cannot control them.  You start to lose your balance often.  You have trouble walking.  You faint.  You have a seizure.    Summary  A migraine headache is an intense, throbbing pain on one side or both sides of the head. Migraines may also cause other symptoms, such as nausea, vomiting, and sensitivity to light and noise.  This condition may be treated with medicines and lifestyle changes. You may also need to avoid certain things that trigger a migraine headache.  Keep a journal to find out what may trigger your migraine headaches.  Contact your health care provider if you have more than 15 headache days in a month or you develop symptoms that are different or more severe than your usual migraine headache symptoms.    ADDITIONAL NOTES AND INSTRUCTIONS    Please follow up with your Primary MD in 24-48 hr.  Seek immediate medical care for any new/worsening signs or symptoms.     Document Released: 12/18/2006 Document Revised: 4/10/2020 Document Reviewed: 1/30/2020  [employer] Patient Education ©2019 [employer] This information is not intended to replace advice given to you by your health care provider. Make sure you discuss any questions you have with your health care provider.

## 2023-10-31 NOTE — ED PROVIDER NOTE - PROGRESS NOTE DETAILS
pt seen in triage susopect more migrainous symptoms recent workup reviewed L sided sensory loss, weakness  Reviewed records from [facility]. Appreciate input from Neurology. No evidence for stroke or TIA. Symptom duration >12 hours. MR brain negative. EEG done, no epileptiform activity was seen and no clinical events or seizures were recorded. Alternative diagnosis migraine phenomenon as her mother does have history of migraine with somatosensory and motor symptoms. Also, patient's trip to [place] was marking 1 year from the time of her father's passing. Discussed case further with Neurology. Started patient on trial of migraine treatment and she improved. Continue valacyclovir 1 more day to complete course of treatment for recent suspected viral meningitis (lymphocytic pleocytosis in CSF, other testing such as CSF HSV PCR was negative). Given the unclear nature of her diagnosis and her hx of Hashimoto's, sent off autoimmune workup and requested input from Rheumatology. Inflammatory markers were normal and Rheumatology doubts autoimmune etiology. Migraine diagnosis seems most likely. Follow up with Neurology as outpatient as needed. Referral to Dr Gabo Alonzo provided. Long conversation with the family and patient I reviewed all her me recent medical work-up although she is complaining of numbness she has no sensory deficits on my examination I do agree with this is probably a complex migraine consistent with her negative work-up but with negative MRI CTAs and after reviewing her recent neurology rheumatology and internal medicine notes we will treat for complex migraine and reevaluate patient her NIHSS 0 patient and patient's family at bedside agree to current plan of care in my clinical expertise it was not beneficial to rescan this patient based off her negative recent work-up exact same symptoms risk of radiation far outweighs risk of obtaining a new diagnosis based of her presentation and recent work-up Patient feeling much better no neurological symptoms we will start Fioricet outpatient stress importance of following up with neurology return precautions given to patient including fever greater than 101 or new onset motor or sensory deficit patient's mother and significant other in room agree to plan of care

## 2023-10-31 NOTE — ED PROVIDER NOTE - PATIENT PORTAL LINK FT
You can access the FollowMyHealth Patient Portal offered by [facility] by registering at the following website: [email or web address]. By joining [facility]’s FollowMyHealth portal, you will also be able to view your health information using other applications (apps) compatible with our system.

## 2023-10-31 NOTE — ED PROVIDER NOTE - IV ALTEPLASE DOOR HIDDEN
"[facility]   VIVI ANAND  PHONE  [phone]  FAX  [phone]    REQUEST FOR INPATIENT AUTH  ICD:  K42.0    Terra Vargas (64 y.o. Female)     Date of Birth Social Security Number Address Home Phone MRN    [id number]  [id number]  [place] [address] [phone] [id number]    Zoroastrian Marital Status          Mosque        Admission Date Admission Type Admitting Provider Attending Provider Department, Room/Bed    12/15/17 Emergency Saul Salazar MD Brown, Donald E, MD [facility] [address], 3343/1S    Discharge Date Discharge Disposition Discharge Destination                      Attending Provider: Saul Salazar MD     Allergies:  Codeine, Zithromax [Azithromycin], Penicillins    Isolation:  None   Infection:  None   Code Status:  FULL    Ht:  160 cm (62.99\")   Wt:  88.2 kg (194 lb 7 oz)    Admission Cmt:  None   Principal Problem:  None                Active Insurance as of 12/15/2017     Primary Coverage     Payor Plan Insurance Group Employer/Plan Group    MEDICARE MEDICARE A & B      Payor Plan Address Payor Plan Phone Number Effective From Effective To    [address] [phone] 11/1/2014     [place] [address]       Subscriber Name Subscriber Birth Date Member ID       TERRA VARGAS 1953 [id number]           Secondary Coverage     Payor Plan Insurance Group Employer/Plan Group    [employer]LL[employer] [place] WELL[place]ARE MEDICAID      Payor Plan Address Payor Plan Phone Number Effective From Effective To    [address] [address] [phone] 12/15/2017     [place] [address]       Subscriber Name Subscriber Birth Date Member ID       TERRA VARGAS 1953 [id number]                 Emergency Contacts      (Rel.) Home Phone Work Phone Mobile Phone    Catarina,April (Daughter) [phone] -- --              " show

## 2023-10-31 NOTE — ED ADULT TRIAGE NOTE - CHIEF COMPLAINT QUOTE
pt bib wheelchair to triage c/o L sided numbness/weakness since approx 7;45. pt d/c'd from  last Friday dx with viral meningitis. L sided weakness present in triage. no code stroke ordered as per dr. Bird.

## 2023-10-31 NOTE — ED PROVIDER NOTE - OBJECTIVE STATEMENT
Patient presents to ED describing left-sided numbness started approximately 3 hours ago.  No overt headache no diplopia.  No chest pain or shortness of breath.  No abdominal pain.  No nausea vomiting diarrhea.  No motor deficits.  No other acute issues symptoms or concerns

## 2023-11-01 VITALS
SYSTOLIC BLOOD PRESSURE: 100 MMHG | RESPIRATION RATE: 15 BRPM | TEMPERATURE: 98 F | DIASTOLIC BLOOD PRESSURE: 66 MMHG | OXYGEN SATURATION: 100 % | HEART RATE: 68 BPM

## 2023-11-01 NOTE — ED ADULT NURSE NOTE - NSICDXPASTMEDICALHX_GEN_ALL_CORE_FT
Patient presents to the ED reporting left flank pain. Began 2 days ago. Also reports dysuria.        PAST MEDICAL HISTORY:  Hemochromatosis

## 2023-11-01 NOTE — ED ADULT NURSE NOTE - NSFALLHARMRISKINTERV_ED_ALL_ED

## 2023-11-02 ENCOUNTER — EMERGENCY (EMERGENCY)
Facility: HOSPITAL | Age: 38
LOS: 1 days | Discharge: HOME | End: 2023-11-02
Attending: EMERGENCY MEDICINE | Admitting: EMERGENCY MEDICINE
Payer: COMMERCIAL

## 2023-11-02 VITALS
OXYGEN SATURATION: 99 % | HEART RATE: 95 BPM | DIASTOLIC BLOOD PRESSURE: 65 MMHG | TEMPERATURE: 98 F | SYSTOLIC BLOOD PRESSURE: 105 MMHG | RESPIRATION RATE: 16 BRPM

## 2023-11-02 VITALS
RESPIRATION RATE: 18 BRPM | WEIGHT: 139.99 LBS | SYSTOLIC BLOOD PRESSURE: 105 MMHG | HEART RATE: 82 BPM | HEIGHT: 63 IN | DIASTOLIC BLOOD PRESSURE: 66 MMHG | OXYGEN SATURATION: 95 % | TEMPERATURE: 98 F

## 2023-11-02 LAB
ALBUMIN SERPL ELPH-MCNC: 4.3 G/DL — SIGNIFICANT CHANGE UP (ref 3.3–5)
ALBUMIN SERPL ELPH-MCNC: 4.3 G/DL — SIGNIFICANT CHANGE UP (ref 3.3–5)
ALP SERPL-CCNC: 67 U/L — SIGNIFICANT CHANGE UP (ref 40–120)
ALP SERPL-CCNC: 67 U/L — SIGNIFICANT CHANGE UP (ref 40–120)
ALT FLD-CCNC: 24 U/L — SIGNIFICANT CHANGE UP (ref 12–78)
ALT FLD-CCNC: 24 U/L — SIGNIFICANT CHANGE UP (ref 12–78)
ANION GAP SERPL CALC-SCNC: 12 MMOL/L — SIGNIFICANT CHANGE UP (ref 5–17)
ANION GAP SERPL CALC-SCNC: 12 MMOL/L — SIGNIFICANT CHANGE UP (ref 5–17)
AST SERPL-CCNC: 13 U/L — LOW (ref 15–37)
AST SERPL-CCNC: 13 U/L — LOW (ref 15–37)
BASOPHILS # BLD AUTO: 0.05 K/UL — SIGNIFICANT CHANGE UP (ref 0–0.2)
BASOPHILS # BLD AUTO: 0.05 K/UL — SIGNIFICANT CHANGE UP (ref 0–0.2)
BASOPHILS NFR BLD AUTO: 0.4 % — SIGNIFICANT CHANGE UP (ref 0–2)
BASOPHILS NFR BLD AUTO: 0.4 % — SIGNIFICANT CHANGE UP (ref 0–2)
BILIRUB SERPL-MCNC: 0.7 MG/DL — SIGNIFICANT CHANGE UP (ref 0.2–1.2)
BILIRUB SERPL-MCNC: 0.7 MG/DL — SIGNIFICANT CHANGE UP (ref 0.2–1.2)
BUN SERPL-MCNC: 10 MG/DL — SIGNIFICANT CHANGE UP (ref 7–23)
BUN SERPL-MCNC: 10 MG/DL — SIGNIFICANT CHANGE UP (ref 7–23)
CALCIUM SERPL-MCNC: 8.8 MG/DL — SIGNIFICANT CHANGE UP (ref 8.5–10.1)
CALCIUM SERPL-MCNC: 8.8 MG/DL — SIGNIFICANT CHANGE UP (ref 8.5–10.1)
CHLORIDE SERPL-SCNC: 106 MMOL/L — SIGNIFICANT CHANGE UP (ref 96–108)
CHLORIDE SERPL-SCNC: 106 MMOL/L — SIGNIFICANT CHANGE UP (ref 96–108)
CO2 SERPL-SCNC: 24 MMOL/L — SIGNIFICANT CHANGE UP (ref 22–31)
CO2 SERPL-SCNC: 24 MMOL/L — SIGNIFICANT CHANGE UP (ref 22–31)
CREAT SERPL-MCNC: 0.86 MG/DL — SIGNIFICANT CHANGE UP (ref 0.5–1.3)
CREAT SERPL-MCNC: 0.86 MG/DL — SIGNIFICANT CHANGE UP (ref 0.5–1.3)
EGFR: 89 ML/MIN/1.73M2 — SIGNIFICANT CHANGE UP
EGFR: 89 ML/MIN/1.73M2 — SIGNIFICANT CHANGE UP
EOSINOPHIL # BLD AUTO: 0.01 K/UL — SIGNIFICANT CHANGE UP (ref 0–0.5)
EOSINOPHIL # BLD AUTO: 0.01 K/UL — SIGNIFICANT CHANGE UP (ref 0–0.5)
EOSINOPHIL NFR BLD AUTO: 0.1 % — SIGNIFICANT CHANGE UP (ref 0–6)
EOSINOPHIL NFR BLD AUTO: 0.1 % — SIGNIFICANT CHANGE UP (ref 0–6)
GLUCOSE SERPL-MCNC: 138 MG/DL — HIGH (ref 70–99)
GLUCOSE SERPL-MCNC: 138 MG/DL — HIGH (ref 70–99)
HCT VFR BLD CALC: 38.9 % — SIGNIFICANT CHANGE UP (ref 34.5–45)
HCT VFR BLD CALC: 38.9 % — SIGNIFICANT CHANGE UP (ref 34.5–45)
HGB BLD-MCNC: 13.4 G/DL — SIGNIFICANT CHANGE UP (ref 11.5–15.5)
HGB BLD-MCNC: 13.4 G/DL — SIGNIFICANT CHANGE UP (ref 11.5–15.5)
IMM GRANULOCYTES NFR BLD AUTO: 0.3 % — SIGNIFICANT CHANGE UP (ref 0–0.9)
IMM GRANULOCYTES NFR BLD AUTO: 0.3 % — SIGNIFICANT CHANGE UP (ref 0–0.9)
LYMPHOCYTES # BLD AUTO: 1.07 K/UL — SIGNIFICANT CHANGE UP (ref 1–3.3)
LYMPHOCYTES # BLD AUTO: 1.07 K/UL — SIGNIFICANT CHANGE UP (ref 1–3.3)
LYMPHOCYTES # BLD AUTO: 8.7 % — LOW (ref 13–44)
LYMPHOCYTES # BLD AUTO: 8.7 % — LOW (ref 13–44)
MCHC RBC-ENTMCNC: 30.6 PG — SIGNIFICANT CHANGE UP (ref 27–34)
MCHC RBC-ENTMCNC: 30.6 PG — SIGNIFICANT CHANGE UP (ref 27–34)
MCHC RBC-ENTMCNC: 34.4 GM/DL — SIGNIFICANT CHANGE UP (ref 32–36)
MCHC RBC-ENTMCNC: 34.4 GM/DL — SIGNIFICANT CHANGE UP (ref 32–36)
MCV RBC AUTO: 88.8 FL — SIGNIFICANT CHANGE UP (ref 80–100)
MCV RBC AUTO: 88.8 FL — SIGNIFICANT CHANGE UP (ref 80–100)
MONOCYTES # BLD AUTO: 0.38 K/UL — SIGNIFICANT CHANGE UP (ref 0–0.9)
MONOCYTES # BLD AUTO: 0.38 K/UL — SIGNIFICANT CHANGE UP (ref 0–0.9)
MONOCYTES NFR BLD AUTO: 3.1 % — SIGNIFICANT CHANGE UP (ref 2–14)
MONOCYTES NFR BLD AUTO: 3.1 % — SIGNIFICANT CHANGE UP (ref 2–14)
NEUTROPHILS # BLD AUTO: 10.73 K/UL — HIGH (ref 1.8–7.4)
NEUTROPHILS # BLD AUTO: 10.73 K/UL — HIGH (ref 1.8–7.4)
NEUTROPHILS NFR BLD AUTO: 87.4 % — HIGH (ref 43–77)
NEUTROPHILS NFR BLD AUTO: 87.4 % — HIGH (ref 43–77)
NRBC # BLD: 0 /100 WBCS — SIGNIFICANT CHANGE UP (ref 0–0)
NRBC # BLD: 0 /100 WBCS — SIGNIFICANT CHANGE UP (ref 0–0)
PLATELET # BLD AUTO: 299 K/UL — SIGNIFICANT CHANGE UP (ref 150–400)
PLATELET # BLD AUTO: 299 K/UL — SIGNIFICANT CHANGE UP (ref 150–400)
POTASSIUM SERPL-MCNC: 3.6 MMOL/L — SIGNIFICANT CHANGE UP (ref 3.5–5.3)
POTASSIUM SERPL-MCNC: 3.6 MMOL/L — SIGNIFICANT CHANGE UP (ref 3.5–5.3)
POTASSIUM SERPL-SCNC: 3.6 MMOL/L — SIGNIFICANT CHANGE UP (ref 3.5–5.3)
POTASSIUM SERPL-SCNC: 3.6 MMOL/L — SIGNIFICANT CHANGE UP (ref 3.5–5.3)
PROT SERPL-MCNC: 7.7 G/DL — SIGNIFICANT CHANGE UP (ref 6–8.3)
PROT SERPL-MCNC: 7.7 G/DL — SIGNIFICANT CHANGE UP (ref 6–8.3)
RBC # BLD: 4.38 M/UL — SIGNIFICANT CHANGE UP (ref 3.8–5.2)
RBC # BLD: 4.38 M/UL — SIGNIFICANT CHANGE UP (ref 3.8–5.2)
RBC # FLD: 12.1 % — SIGNIFICANT CHANGE UP (ref 10.3–14.5)
RBC # FLD: 12.1 % — SIGNIFICANT CHANGE UP (ref 10.3–14.5)
SODIUM SERPL-SCNC: 142 MMOL/L — SIGNIFICANT CHANGE UP (ref 135–145)
SODIUM SERPL-SCNC: 142 MMOL/L — SIGNIFICANT CHANGE UP (ref 135–145)
WBC # BLD: 12.28 K/UL — HIGH (ref 3.8–10.5)
WBC # BLD: 12.28 K/UL — HIGH (ref 3.8–10.5)
WBC # FLD AUTO: 12.28 K/UL — HIGH (ref 3.8–10.5)
WBC # FLD AUTO: 12.28 K/UL — HIGH (ref 3.8–10.5)

## 2023-11-02 PROCEDURE — 96372 THER/PROPH/DIAG INJ SC/IM: CPT | Mod: XU

## 2023-11-02 PROCEDURE — 99284 EMERGENCY DEPT VISIT MOD MDM: CPT | Mod: 25

## 2023-11-02 PROCEDURE — 36415 COLL VENOUS BLD VENIPUNCTURE: CPT

## 2023-11-02 PROCEDURE — 99284 EMERGENCY DEPT VISIT MOD MDM: CPT

## 2023-11-02 PROCEDURE — 96367 TX/PROPH/DG ADDL SEQ IV INF: CPT

## 2023-11-02 PROCEDURE — 80053 COMPREHEN METABOLIC PANEL: CPT

## 2023-11-02 PROCEDURE — 85025 COMPLETE CBC W/AUTO DIFF WBC: CPT

## 2023-11-02 PROCEDURE — 96365 THER/PROPH/DIAG IV INF INIT: CPT

## 2023-11-02 PROCEDURE — 96375 TX/PRO/DX INJ NEW DRUG ADDON: CPT

## 2023-11-02 RX ORDER — SODIUM CHLORIDE 9 MG/ML
1950 INJECTION INTRAMUSCULAR; INTRAVENOUS; SUBCUTANEOUS ONCE
Refills: 0 | Status: COMPLETED | OUTPATIENT
Start: 2023-11-02 | End: 2023-11-02

## 2023-11-02 RX ORDER — DEXAMETHASONE 0.5 MG/5ML
10 ELIXIR ORAL ONCE
Refills: 0 | Status: COMPLETED | OUTPATIENT
Start: 2023-11-02 | End: 2023-11-02

## 2023-11-02 RX ORDER — DEXAMETHASONE 0.5 MG/5ML
10 ELIXIR ORAL ONCE
Refills: 0 | Status: DISCONTINUED | OUTPATIENT
Start: 2023-11-02 | End: 2023-11-02

## 2023-11-02 RX ORDER — RIZATRIPTAN BENZOATE 5 MG/1
1 TABLET ORAL
Qty: 15 | Refills: 0
Start: 2023-11-02

## 2023-11-02 RX ORDER — SUMATRIPTAN SUCCINATE 4 MG/.5ML
6 INJECTION, SOLUTION SUBCUTANEOUS ONCE
Refills: 0 | Status: COMPLETED | OUTPATIENT
Start: 2023-11-02 | End: 2023-11-02

## 2023-11-02 RX ORDER — METOCLOPRAMIDE HCL 10 MG
10 TABLET ORAL ONCE
Refills: 0 | Status: COMPLETED | OUTPATIENT
Start: 2023-11-02 | End: 2023-11-02

## 2023-11-02 RX ORDER — KETOROLAC TROMETHAMINE 30 MG/ML
15 SYRINGE (ML) INJECTION ONCE
Refills: 0 | Status: DISCONTINUED | OUTPATIENT
Start: 2023-11-02 | End: 2023-11-02

## 2023-11-02 RX ADMIN — SODIUM CHLORIDE 1950 MILLILITER(S): 9 INJECTION INTRAMUSCULAR; INTRAVENOUS; SUBCUTANEOUS at 14:54

## 2023-11-02 RX ADMIN — Medication 104 MILLIGRAM(S): at 14:54

## 2023-11-02 RX ADMIN — Medication 102 MILLIGRAM(S): at 15:05

## 2023-11-02 RX ADMIN — SUMATRIPTAN SUCCINATE 6 MILLIGRAM(S): 4 INJECTION, SOLUTION SUBCUTANEOUS at 18:45

## 2023-11-02 RX ADMIN — Medication 15 MILLIGRAM(S): at 14:53

## 2023-11-02 RX ADMIN — Medication 15 MILLIGRAM(S): at 15:51

## 2023-11-02 RX ADMIN — SUMATRIPTAN SUCCINATE 6 MILLIGRAM(S): 4 INJECTION, SOLUTION SUBCUTANEOUS at 18:13

## 2023-11-02 RX ADMIN — Medication 10 MILLIGRAM(S): at 15:16

## 2023-11-02 RX ADMIN — Medication 10 MILLIGRAM(S): at 15:35

## 2023-11-02 NOTE — ED PROVIDER NOTE - NSFOLLOWUPINSTRUCTIONS_ED_ALL_ED_FT
Migraine Headache  A migraine headache is an intense, throbbing pain on one side or both sides of the head. Migraine headaches may also cause other symptoms, such as nausea, vomiting, and sensitivity to light and noise. A migraine headache can last from 4 hours to 3 days. Talk with your doctor about what things may bring on (trigger) your migraine headaches.    What are the causes?  The exact cause of this condition is not known. However, a migraine may be caused when nerves in the brain become irritated and release chemicals that cause inflammation of blood vessels. This inflammation causes pain. This condition may be triggered or caused by:  Drinking alcohol.  Smoking.  Taking medicines, such as:  Medicine used to treat chest pain (nitroglycerin).  Birth control pills.  Estrogen.  Certain blood pressure medicines.  Eating or drinking products that contain nitrates, glutamate, aspartame, or tyramine. Aged cheeses, chocolate, or caffeine may also be triggers.  Doing physical activity.  Other things that may trigger a migraine headache include:  Menstruation.  Pregnancy.  Hunger.  Stress.  Lack of sleep or too much sleep.  Weather changes.  Fatigue.  What increases the risk?  The following factors may make you more likely to experience migraine headaches:  Being a certain age. This condition is more common in people who are 25–55 years old.  Being female.  Having a family history of migraine headaches.  Being .  Having a mental health condition, such as depression or anxiety.  Being obese.  What are the signs or symptoms?  The main symptom of this condition is pulsating or throbbing pain. This pain may:  Happen in any area of the head, such as on one side or both sides.  Interfere with daily activities.  Get worse with physical activity.  Get worse with exposure to bright lights or loud noises.  Other symptoms may include:  Nausea.  Vomiting.  Dizziness.  General sensitivity to bright lights, loud noises, or smells.  Before you get a migraine headache, you may get warning signs (an aura). An aura may include:  Seeing flashing lights or having blind spots.  Seeing bright spots, halos, or zigzag lines.  Having tunnel vision or blurred vision.  Having numbness or a tingling feeling.  Having trouble talking.  Having muscle weakness.  Some people have symptoms after a migraine headache (postdromal phase), such as:  Feeling tired.  Difficulty concentrating.  How is this diagnosed?  A migraine headache can be diagnosed based on:  Your symptoms.  A physical exam.  Tests, such as:  CT scan or an MRI of the head. These imaging tests can help rule out other causes of headaches.  Taking fluid from the spine (lumbar puncture) and analyzing it (cerebrospinal fluid analysis, or CSF analysis).  How is this treated?  This condition may be treated with medicines that:  Relieve pain.  Relieve nausea.  Prevent migraine headaches.  Treatment for this condition may also include:  Acupuncture.  Lifestyle changes like avoiding foods that trigger migraine headaches.  Biofeedback.  Cognitive behavioral therapy.  Follow these instructions at home:  Medicines    Take over-the-counter and prescription medicines only as told by your health care provider.  Ask your health care provider if the medicine prescribed to you:  Requires you to avoid driving or using heavy machinery.  Can cause constipation. You may need to take these actions to prevent or treat constipation:  Drink enough fluid to keep your urine pale yellow.  Take over-the-counter or prescription medicines.  Eat foods that are high in fiber, such as beans, whole grains, and fresh fruits and vegetables.  Limit foods that are high in fat and processed sugars, such as fried or sweet foods.  Lifestyle    Do not drink alcohol.  Do not use any products that contain nicotine or tobacco, such as cigarettes, e-cigarettes, and chewing tobacco. If you need help quitting, ask your health care provider.  Get at least 8 hours of sleep every night.  Find ways to manage stress, such as meditation, deep breathing, or yoga.  General instructions    Keep a journal to find out what may trigger your migraine headaches. For example, write down:  What you eat and drink.  How much sleep you get.  Any change to your diet or medicines.  If you have a migraine headache:  Avoid things that make your symptoms worse, such as bright lights.  It may help to lie down in a dark, quiet room.  Do not drive or use heavy machinery.  Ask your health care provider what activities are safe for you while you are experiencing symptoms.  Keep all follow-up visits as told by your health care provider. This is important.  Contact a health care provider if:  You develop symptoms that are different or more severe than your usual migraine headache symptoms.  You have more than 15 headache days in one month.  Get help right away if:  Your migraine headache becomes severe.  Your migraine headache lasts longer than 72 hours.  You have a fever.  You have a stiff neck.  You have vision loss.  Your muscles feel weak or like you cannot control them.  You start to lose your balance often.  You have trouble walking.  You faint.  You have a seizure.  Summary  A migraine headache is an intense, throbbing pain on one side or both sides of the head. Migraines may also cause other symptoms, such as nausea, vomiting, and sensitivity to light and noise.  This condition may be treated with medicines and lifestyle changes. You may also need to avoid certain things that trigger a migraine headache.  Keep a journal to find out what may trigger your migraine headaches.  Contact your health care provider if you have more than 15 headache days in a month or you develop symptoms that are different or more severe than your usual migraine headache symptoms.  This information is not intended to replace advice given to you by your health care provider. Make sure you discuss any questions you have with your health care provider.    **Follow up with your neurologist this week. Take maxalt 10mg at the first onset of your headache and increase your fluid intake. Return for worsening symptoms, inability to keep fluids down, any concerns.

## 2023-11-02 NOTE — ED PROVIDER NOTE - OBJECTIVE STATEMENT
Patient came into the ED with her mom and isaiah c/o HA, N/V today. Patient dx with viral meningitis 10/16. Patient initially had HA and numbness to right-side of face and was admitted with full work-up for CVA, sz etc. Patient was d/c home and a few days later had the same symptoms on the other side of her face and went to another hospital and had another stroke workup. patient seen in the ED 2 days ago for migraine symptoms and d/c home. patient today took a fiorcet 7a.m. without relief. states today symptoms are similar. patient denies being pregnant. Patient came into the ED with her mom and isaiah c/o HA, N/V today. Patient dx with viral meningitis 10/16. Patient initially had HA and numbness to right-side of face and was admitted with full work-up for CVA, sz etc. Patient was d/c home and a few days later had the same symptoms on the other side of her face and went to another hospital and had another stroke workup. patient seen in the ED 2 days ago for migraine symptoms and d/c home. patient today took a fiorcet 7a.m. without relief. states today symptoms are similar. patient denies being pregnant. patient fell earlier today secondary to her symptoms.

## 2023-11-02 NOTE — ED ADULT TRIAGE NOTE - CHIEF COMPLAINT QUOTE
Patient A/Ox4 with clear speech. BIBA from home for migraines, photophobia and left sided weakness x2 weeks. Was seen on 10/16 and treated for viral meningitis. Was seen again 10/31 for persistent symptoms.

## 2023-11-02 NOTE — ED PROVIDER NOTE - CONSTITUTIONAL, MLM
normal... Well appearing, awake, alert, oriented to person, place, time/situation and curled up, eyes closed avoiding light.

## 2023-11-02 NOTE — ED PROVIDER NOTE - CARE PROVIDER_API CALL
Gabo Alonzo  Neurology  [address], [address]  [place] [address]  Phone: [phone]  Fax: [phone]  Follow Up Time:

## 2023-11-02 NOTE — ED PROVIDER NOTE - PATIENT PORTAL LINK FT
You can access the FollowMyHealth Patient Portal offered by [facility] by registering at the following website: [email or web address]. By joining [employer]’s FollowMyHealth portal, you will also be able to view your health information using other applications (apps) compatible with our system.

## 2023-11-02 NOTE — ED PROVIDER NOTE - CLINICAL SUMMARY MEDICAL DECISION MAKING FREE TEXT BOX
concern for migraine headache with dehydration now triggered after recent viral meningitis. labs for vomiting today. IVF, reglan, decadron.

## 2023-11-02 NOTE — PHARMACOTHERAPY INTERVENTION NOTE - COMMENTS
37 yo female presenting with migraine symptoms s/p fioricet use outpatient. Provider inquiring appropriate dose of sumatriptan to be given via subcutaneous route. Recommended 6 mg x 1 in areas of the body with adequate skin and subcutaneous thickness. Order entered per discussion with Dr. Bailon.

## 2023-11-02 NOTE — ED ADULT NURSE NOTE - OBJECTIVE STATEMENT
Patient is 37yo F arrives via EMS with family at bedside reports headache with photophobia and left side numbness since 10/16. Patient curled up in bed, family answering questions for patient. Significant other reports patient went to [facility] on 10/16 with c/o headache and right sided numbness, LP was performed which patient was told on 10/18 she has viral meningitis and sent home with Rx of valacyclovir. Patient was brought to [facility] on 10/22 with headache, photophobia, left side weakness, was admitted to "cardiology" for one week for evaluation of left side numbness. Patient received CTA x2 on 10/22, 10/24 scans and MRI on 10/25 which were negative for a stroke, obtained EEG which was negative for seizure but report states "mild intermittent left temporal lobe focal slowing" Patient was eventually discharged home. Patient returned to  on 10/31 for same complaints, was discharged with diagnosis of complex migraines. Patient now presents with still c/o same symptoms. Significant other endorsing intermittent chills, unsure of temperature, did not actually check. Family endorses PMHx hashimoto's on levothyroxine and hemachromatosis not on medications or phlebotomy 2/2 "patient gets a menstrual period still." Patient is 39yo F arrives via EMS with family at bedside reports headache with photophobia and left side numbness since 10/16. Patient curled up in bed, family answering questions for patient. Significant other reports patient went to [facility] on 10/16 with c/o headache and right sided numbness, LP was performed which patient was told on 10/18 she has viral meningitis and sent home with Rx of valacyclovir. Patient was brought to [facility] on 10/22 with headache, photophobia, left side weakness, was admitted to "cardiology" for one week for evaluation of left side numbness. Patient received CTA x2 on 10/22, 10/24 scans and MRI on 10/25 which were negative for a stroke, obtained EEG which was negative for seizure but report states "mild intermittent left temporal lobe focal slowing" Patient was eventually discharged home. Patient returned to  on 10/31 for same complaints, was discharged with diagnosis of complex migraines sent home with Rx for Fioricet, last dose 730am today. Patient now presents with still c/o same symptoms. Significant other endorsing intermittent chills, unsure of temperature, did not actually check. Family endorses PMHx hashimoto's on levothyroxine and hemachromatosis not on medications or phlebotomy 2/2 "patient gets a menstrual period still."

## 2023-11-02 NOTE — ED ADULT NURSE NOTE - CAS TRG GENERAL NORM CIRC DET
pt waiting for left side face sutures removal
Strong peripheral pulses/Capillary refill less/equal to 2 seconds

## 2023-11-02 NOTE — ED ADULT NURSE NOTE - ED CARDIAC HEART SOUNDS
well appearing pt w/ cough and sore throat, no exudate or palantine petechiae, avss, lungs clear, will rvp and strep throat, xray, hydration, antitussives normal S1, S2 heard

## 2023-11-07 ENCOUNTER — APPOINTMENT (OUTPATIENT)
Dept: INTERNAL MEDICINE | Facility: CLINIC | Age: 38
End: 2023-11-07

## 2023-11-07 ENCOUNTER — INPATIENT (INPATIENT)
Facility: HOSPITAL | Age: 38
LOS: 5 days | Discharge: HOME | DRG: 103 | End: 2023-11-13
Attending: HOSPITALIST | Admitting: INTERNAL MEDICINE
Payer: COMMERCIAL

## 2023-11-07 VITALS
HEIGHT: 63 IN | DIASTOLIC BLOOD PRESSURE: 66 MMHG | OXYGEN SATURATION: 98 % | TEMPERATURE: 98 F | RESPIRATION RATE: 18 BRPM | HEART RATE: 94 BPM | SYSTOLIC BLOOD PRESSURE: 108 MMHG | WEIGHT: 139.99 LBS

## 2023-11-07 DIAGNOSIS — R53.1 WEAKNESS: ICD-10-CM

## 2023-11-07 DIAGNOSIS — R51.9 HEADACHE, UNSPECIFIED: ICD-10-CM

## 2023-11-07 DIAGNOSIS — Z29.9 ENCOUNTER FOR PROPHYLACTIC MEASURES, UNSPECIFIED: ICD-10-CM

## 2023-11-07 DIAGNOSIS — E03.9 HYPOTHYROIDISM, UNSPECIFIED: ICD-10-CM

## 2023-11-07 LAB
ALBUMIN SERPL ELPH-MCNC: 4.3 G/DL — SIGNIFICANT CHANGE UP (ref 3.3–5)
ALBUMIN SERPL ELPH-MCNC: 4.3 G/DL — SIGNIFICANT CHANGE UP (ref 3.3–5)
ALP SERPL-CCNC: 63 U/L — SIGNIFICANT CHANGE UP (ref 40–120)
ALP SERPL-CCNC: 63 U/L — SIGNIFICANT CHANGE UP (ref 40–120)
ALT FLD-CCNC: 22 U/L — SIGNIFICANT CHANGE UP (ref 12–78)
ALT FLD-CCNC: 22 U/L — SIGNIFICANT CHANGE UP (ref 12–78)
ANION GAP SERPL CALC-SCNC: 11 MMOL/L — SIGNIFICANT CHANGE UP (ref 5–17)
ANION GAP SERPL CALC-SCNC: 11 MMOL/L — SIGNIFICANT CHANGE UP (ref 5–17)
AST SERPL-CCNC: 22 U/L — SIGNIFICANT CHANGE UP (ref 15–37)
AST SERPL-CCNC: 22 U/L — SIGNIFICANT CHANGE UP (ref 15–37)
BASOPHILS # BLD AUTO: 0.05 K/UL — SIGNIFICANT CHANGE UP (ref 0–0.2)
BASOPHILS # BLD AUTO: 0.05 K/UL — SIGNIFICANT CHANGE UP (ref 0–0.2)
BASOPHILS NFR BLD AUTO: 0.6 % — SIGNIFICANT CHANGE UP (ref 0–2)
BASOPHILS NFR BLD AUTO: 0.6 % — SIGNIFICANT CHANGE UP (ref 0–2)
BILIRUB SERPL-MCNC: 0.9 MG/DL — SIGNIFICANT CHANGE UP (ref 0.2–1.2)
BILIRUB SERPL-MCNC: 0.9 MG/DL — SIGNIFICANT CHANGE UP (ref 0.2–1.2)
BUN SERPL-MCNC: 9 MG/DL — SIGNIFICANT CHANGE UP (ref 7–23)
BUN SERPL-MCNC: 9 MG/DL — SIGNIFICANT CHANGE UP (ref 7–23)
CALCIUM SERPL-MCNC: 9.6 MG/DL — SIGNIFICANT CHANGE UP (ref 8.5–10.1)
CALCIUM SERPL-MCNC: 9.6 MG/DL — SIGNIFICANT CHANGE UP (ref 8.5–10.1)
CHLORIDE SERPL-SCNC: 108 MMOL/L — SIGNIFICANT CHANGE UP (ref 96–108)
CHLORIDE SERPL-SCNC: 108 MMOL/L — SIGNIFICANT CHANGE UP (ref 96–108)
CO2 SERPL-SCNC: 22 MMOL/L — SIGNIFICANT CHANGE UP (ref 22–31)
CO2 SERPL-SCNC: 22 MMOL/L — SIGNIFICANT CHANGE UP (ref 22–31)
CREAT SERPL-MCNC: 0.88 MG/DL — SIGNIFICANT CHANGE UP (ref 0.5–1.3)
CREAT SERPL-MCNC: 0.88 MG/DL — SIGNIFICANT CHANGE UP (ref 0.5–1.3)
EGFR: 86 ML/MIN/1.73M2 — SIGNIFICANT CHANGE UP
EGFR: 86 ML/MIN/1.73M2 — SIGNIFICANT CHANGE UP
EOSINOPHIL # BLD AUTO: 0.13 K/UL — SIGNIFICANT CHANGE UP (ref 0–0.5)
EOSINOPHIL # BLD AUTO: 0.13 K/UL — SIGNIFICANT CHANGE UP (ref 0–0.5)
EOSINOPHIL NFR BLD AUTO: 1.5 % — SIGNIFICANT CHANGE UP (ref 0–6)
EOSINOPHIL NFR BLD AUTO: 1.5 % — SIGNIFICANT CHANGE UP (ref 0–6)
GLUCOSE SERPL-MCNC: 129 MG/DL — HIGH (ref 70–99)
GLUCOSE SERPL-MCNC: 129 MG/DL — HIGH (ref 70–99)
HCG SERPL-ACNC: <1 MIU/ML — SIGNIFICANT CHANGE UP
HCG SERPL-ACNC: <1 MIU/ML — SIGNIFICANT CHANGE UP
HCT VFR BLD CALC: 40.1 % — SIGNIFICANT CHANGE UP (ref 34.5–45)
HCT VFR BLD CALC: 40.1 % — SIGNIFICANT CHANGE UP (ref 34.5–45)
HGB BLD-MCNC: 14.3 G/DL — SIGNIFICANT CHANGE UP (ref 11.5–15.5)
HGB BLD-MCNC: 14.3 G/DL — SIGNIFICANT CHANGE UP (ref 11.5–15.5)
HIV 1 & 2 AB SERPL IA.RAPID: SIGNIFICANT CHANGE UP
HIV 1 & 2 AB SERPL IA.RAPID: SIGNIFICANT CHANGE UP
IMM GRANULOCYTES NFR BLD AUTO: 0.2 % — SIGNIFICANT CHANGE UP (ref 0–0.9)
IMM GRANULOCYTES NFR BLD AUTO: 0.2 % — SIGNIFICANT CHANGE UP (ref 0–0.9)
LYMPHOCYTES # BLD AUTO: 2.42 K/UL — SIGNIFICANT CHANGE UP (ref 1–3.3)
LYMPHOCYTES # BLD AUTO: 2.42 K/UL — SIGNIFICANT CHANGE UP (ref 1–3.3)
LYMPHOCYTES # BLD AUTO: 28.6 % — SIGNIFICANT CHANGE UP (ref 13–44)
LYMPHOCYTES # BLD AUTO: 28.6 % — SIGNIFICANT CHANGE UP (ref 13–44)
MAGNESIUM SERPL-MCNC: 2.2 MG/DL — SIGNIFICANT CHANGE UP (ref 1.6–2.6)
MAGNESIUM SERPL-MCNC: 2.2 MG/DL — SIGNIFICANT CHANGE UP (ref 1.6–2.6)
MCHC RBC-ENTMCNC: 30.7 PG — SIGNIFICANT CHANGE UP (ref 27–34)
MCHC RBC-ENTMCNC: 30.7 PG — SIGNIFICANT CHANGE UP (ref 27–34)
MCHC RBC-ENTMCNC: 35.7 GM/DL — SIGNIFICANT CHANGE UP (ref 32–36)
MCHC RBC-ENTMCNC: 35.7 GM/DL — SIGNIFICANT CHANGE UP (ref 32–36)
MCV RBC AUTO: 86.1 FL — SIGNIFICANT CHANGE UP (ref 80–100)
MCV RBC AUTO: 86.1 FL — SIGNIFICANT CHANGE UP (ref 80–100)
MONOCYTES # BLD AUTO: 0.53 K/UL — SIGNIFICANT CHANGE UP (ref 0–0.9)
MONOCYTES # BLD AUTO: 0.53 K/UL — SIGNIFICANT CHANGE UP (ref 0–0.9)
MONOCYTES NFR BLD AUTO: 6.3 % — SIGNIFICANT CHANGE UP (ref 2–14)
MONOCYTES NFR BLD AUTO: 6.3 % — SIGNIFICANT CHANGE UP (ref 2–14)
NEUTROPHILS # BLD AUTO: 5.32 K/UL — SIGNIFICANT CHANGE UP (ref 1.8–7.4)
NEUTROPHILS # BLD AUTO: 5.32 K/UL — SIGNIFICANT CHANGE UP (ref 1.8–7.4)
NEUTROPHILS NFR BLD AUTO: 62.8 % — SIGNIFICANT CHANGE UP (ref 43–77)
NEUTROPHILS NFR BLD AUTO: 62.8 % — SIGNIFICANT CHANGE UP (ref 43–77)
NRBC # BLD: 0 /100 WBCS — SIGNIFICANT CHANGE UP (ref 0–0)
NRBC # BLD: 0 /100 WBCS — SIGNIFICANT CHANGE UP (ref 0–0)
PLATELET # BLD AUTO: 343 K/UL — SIGNIFICANT CHANGE UP (ref 150–400)
PLATELET # BLD AUTO: 343 K/UL — SIGNIFICANT CHANGE UP (ref 150–400)
POTASSIUM SERPL-MCNC: 4.1 MMOL/L — SIGNIFICANT CHANGE UP (ref 3.5–5.3)
POTASSIUM SERPL-MCNC: 4.1 MMOL/L — SIGNIFICANT CHANGE UP (ref 3.5–5.3)
POTASSIUM SERPL-SCNC: 4.1 MMOL/L — SIGNIFICANT CHANGE UP (ref 3.5–5.3)
POTASSIUM SERPL-SCNC: 4.1 MMOL/L — SIGNIFICANT CHANGE UP (ref 3.5–5.3)
PROT SERPL-MCNC: 8 G/DL — SIGNIFICANT CHANGE UP (ref 6–8.3)
PROT SERPL-MCNC: 8 G/DL — SIGNIFICANT CHANGE UP (ref 6–8.3)
RBC # BLD: 4.66 M/UL — SIGNIFICANT CHANGE UP (ref 3.8–5.2)
RBC # BLD: 4.66 M/UL — SIGNIFICANT CHANGE UP (ref 3.8–5.2)
RBC # FLD: 12.1 % — SIGNIFICANT CHANGE UP (ref 10.3–14.5)
RBC # FLD: 12.1 % — SIGNIFICANT CHANGE UP (ref 10.3–14.5)
SODIUM SERPL-SCNC: 141 MMOL/L — SIGNIFICANT CHANGE UP (ref 135–145)
SODIUM SERPL-SCNC: 141 MMOL/L — SIGNIFICANT CHANGE UP (ref 135–145)
TSH SERPL-MCNC: 1.66 UIU/ML — SIGNIFICANT CHANGE UP (ref 0.36–3.74)
TSH SERPL-MCNC: 1.66 UIU/ML — SIGNIFICANT CHANGE UP (ref 0.36–3.74)
WBC # BLD: 8.47 K/UL — SIGNIFICANT CHANGE UP (ref 3.8–10.5)
WBC # BLD: 8.47 K/UL — SIGNIFICANT CHANGE UP (ref 3.8–10.5)
WBC # FLD AUTO: 8.47 K/UL — SIGNIFICANT CHANGE UP (ref 3.8–10.5)
WBC # FLD AUTO: 8.47 K/UL — SIGNIFICANT CHANGE UP (ref 3.8–10.5)

## 2023-11-07 PROCEDURE — 99239 HOSP IP/OBS DSCHRG MGMT >30: CPT | Mod: GC

## 2023-11-07 PROCEDURE — 70450 CT HEAD/BRAIN W/O DYE: CPT | Mod: 26

## 2023-11-07 PROCEDURE — 99285 EMERGENCY DEPT VISIT HI MDM: CPT

## 2023-11-07 RX ORDER — VALACYCLOVIR 500 MG/1
1 TABLET, FILM COATED ORAL
Qty: 0 | Refills: 0 | DISCHARGE

## 2023-11-07 RX ORDER — MAGNESIUM SULFATE 500 MG/ML
2 VIAL (ML) INJECTION ONCE
Refills: 0 | Status: COMPLETED | OUTPATIENT
Start: 2023-11-07 | End: 2023-11-07

## 2023-11-07 RX ORDER — ONDANSETRON 8 MG/1
4 TABLET, FILM COATED ORAL EVERY 8 HOURS
Refills: 0 | Status: DISCONTINUED | OUTPATIENT
Start: 2023-11-07 | End: 2023-11-13

## 2023-11-07 RX ORDER — ONDANSETRON 8 MG/1
4 TABLET, FILM COATED ORAL ONCE
Refills: 0 | Status: COMPLETED | OUTPATIENT
Start: 2023-11-07 | End: 2023-11-07

## 2023-11-07 RX ORDER — ENOXAPARIN SODIUM 100 MG/ML
40 INJECTION SUBCUTANEOUS EVERY 24 HOURS
Refills: 0 | Status: DISCONTINUED | OUTPATIENT
Start: 2023-11-07 | End: 2023-11-07

## 2023-11-07 RX ORDER — LEVOTHYROXINE SODIUM 125 MCG
25 TABLET ORAL DAILY
Refills: 0 | Status: DISCONTINUED | OUTPATIENT
Start: 2023-11-07 | End: 2023-11-13

## 2023-11-07 RX ORDER — SODIUM CHLORIDE 9 MG/ML
1000 INJECTION INTRAMUSCULAR; INTRAVENOUS; SUBCUTANEOUS ONCE
Refills: 0 | Status: COMPLETED | OUTPATIENT
Start: 2023-11-07 | End: 2023-11-07

## 2023-11-07 RX ORDER — METOCLOPRAMIDE HCL 10 MG
10 TABLET ORAL ONCE
Refills: 0 | Status: COMPLETED | OUTPATIENT
Start: 2023-11-07 | End: 2023-11-07

## 2023-11-07 RX ORDER — INFLUENZA VIRUS VACCINE 15; 15; 15; 15 UG/.5ML; UG/.5ML; UG/.5ML; UG/.5ML
0.5 SUSPENSION INTRAMUSCULAR ONCE
Refills: 0 | Status: DISCONTINUED | OUTPATIENT
Start: 2023-11-07 | End: 2023-11-13

## 2023-11-07 RX ORDER — LANOLIN ALCOHOL/MO/W.PET/CERES
3 CREAM (GRAM) TOPICAL AT BEDTIME
Refills: 0 | Status: DISCONTINUED | OUTPATIENT
Start: 2023-11-07 | End: 2023-11-13

## 2023-11-07 RX ORDER — ACETAMINOPHEN 500 MG
650 TABLET ORAL EVERY 6 HOURS
Refills: 0 | Status: DISCONTINUED | OUTPATIENT
Start: 2023-11-07 | End: 2023-11-11

## 2023-11-07 RX ORDER — ACETAMINOPHEN 500 MG
1000 TABLET ORAL ONCE
Refills: 0 | Status: COMPLETED | OUTPATIENT
Start: 2023-11-07 | End: 2023-11-07

## 2023-11-07 RX ADMIN — Medication 25 GRAM(S): at 12:29

## 2023-11-07 RX ADMIN — Medication 1 CAPSULE(S): at 21:20

## 2023-11-07 RX ADMIN — SODIUM CHLORIDE 1000 MILLILITER(S): 9 INJECTION INTRAMUSCULAR; INTRAVENOUS; SUBCUTANEOUS at 11:28

## 2023-11-07 RX ADMIN — Medication 400 MILLIGRAM(S): at 11:28

## 2023-11-07 RX ADMIN — Medication 1000 MILLIGRAM(S): at 11:57

## 2023-11-07 RX ADMIN — Medication 10 MILLIGRAM(S): at 11:28

## 2023-11-07 RX ADMIN — ONDANSETRON 4 MILLIGRAM(S): 8 TABLET, FILM COATED ORAL at 15:54

## 2023-11-07 RX ADMIN — Medication 1 CAPSULE(S): at 22:20

## 2023-11-07 RX ADMIN — Medication 1 MILLIGRAM(S): at 12:15

## 2023-11-07 NOTE — ED ADULT NURSE NOTE - NSICDXPASTMEDICALHX_GEN_ALL_CORE_FT
PAST MEDICAL HISTORY:  H/O Hashimoto thyroiditis     Hemochromatosis     Hypothyroidism     Viral meningitis

## 2023-11-07 NOTE — ED PROVIDER NOTE - NSCAREINITIATED _GEN_ER
DATE OF ADMISSION:  2018.    DATE OF DISCHARGE:  2018.    ADMIT DIAGNOSES:  1.  Intrauterine pregnancy at 27 weeks' gestation.  2.  Placenta previa.  3.  Small amount of vaginal bleeding.    DISCHARGE DIAGNOSES:  1.  Intrauterine pregnancy at 27 weeks' gestation.  2.  Placenta previa.  3.  Small amount of vaginal bleeding.    HOSPITAL COURSE:  Hayley is a 29-year-old  female,  2, para 1, at   27 and 2/7th weeks' gestation who had presented to Labor and Delivery in the   evening of 2018.  This patient has a known history of placenta previa and   was previously hospitalized for a betamethasone series due to vaginal bleeding.    The patient reported a small amount of vaginal bleeding over yesterday evening.    She was placed under observation and was monitored very closely.  The bleeding   abated and the patient was ready for discharge to home on the morning of   2018.  The patient was instructed to maintain limited activity, obviously   to maintain pelvic rest and to return to the hospital if any heavy vaginal   bleeding occurs.  She was instructed to have a normal diet and to follow with   Dr. Aaron in 1-2 weeks.      DOMINGO/ANA LUISA  dd: 2018 06:58:09 (CDT)  td: 2018 07:35:09 (CDT)  Doc ID   #[id number]  Job ID #[id number]    CC:   
Dictation #2  MRN:[id number]  CSN:[id number]  [id number]  
Toby Dick(NP)

## 2023-11-07 NOTE — ED ADULT TRIAGE NOTE - HOW PATIENT ADDRESSED, PROFILE
Spoke with Dr. Alejandra regarding patient concern for elevated K+.  Plan to send a script for [place] 8.4 Grams twice a week for elevated K+ PRN.  If need can schedule twice a week.  
Patricia

## 2023-11-07 NOTE — H&P ADULT - NSHPREVIEWOFSYSTEMS_GEN_ALL_CORE
REVIEW OF SYSTEMS:    CONSTITUTIONAL:  +weakness  EYES/ENT:  No visual changes;  No vertigo or throat pain   NECK:  No pain or stiffness  RESPIRATORY:  No cough, wheezing, hemoptysis; No shortness of breath  CARDIOVASCULAR:  No chest pain or palpitations  GASTROINTESTINAL:  No abdominal or epigastric pain. No nausea, vomiting, or hematemesis; No diarrhea or constipation. No melena or hematochezia.  GENITOURINARY:  No dysuria, frequency or hematuria  NEUROLOGICAL: +facial numbness, +HA, +b/l UE and LE numbess and weakness   SKIN:  No itching, rashes REVIEW OF SYSTEMS:    CONSTITUTIONAL:  + gen. weakness  EYES/ENT:  No visual changes;  No vertigo or throat pain   NECK:  No pain or stiffness  RESPIRATORY:  No cough, wheezing, hemoptysis; No shortness of breath  CARDIOVASCULAR:  No chest pain or palpitations  GASTROINTESTINAL:  No abdominal or epigastric pain. No nausea, vomiting, or hematemesis; No diarrhea or constipation. No melena or hematochezia.  GENITOURINARY:  No dysuria, frequency or hematuria  NEUROLOGICAL: +facial numbness, +HA, +b/l UE and LE numbess and weakness   SKIN:  No itching, rashes

## 2023-11-07 NOTE — H&P ADULT - HISTORY OF PRESENT ILLNESS
38 year old female with pmhx of Hashimoto's disease, hypothyroidism, hemochromatosis, came in complaining of severe headache and bilateral numbness sensation. On 10/16 patient had right sided numbness and weakness and went to [facility]. After extensive workup, she was diagnosed with viral meningitis and was treated with acyclovir. She was discharged from [facility] on 10/19. On 10/22, patient complained of right sided weakness and numbness and went back to [facility]. After discharge she returned to the hospital on 10/23 complaining of bilateral weakness radiating to her legs. She was admitted and had complete stroke workup including CT, MRI, EEG which were all negative. She was discharged 10/27. On 10/31 she returned again to [facility] with similar complaints and was discharged from the ED. On 11/2 she presented to [facility] ED complaining of headache. She was discharged on Maxalta and has an appt tomorrow with neurology outpatient. Patient now present with her fiancee stating that her symptoms have not resolved since 11/2 and that she has HA along b/l weakness in her face, arms and legs. Patient states she was nauseous before but that resolved after receiving Zofran but she feels weak now and has a HA. She notes that Maxalta has been helping with her headaches. History largely obtained from kylee at bedside as patient was sleeping and very lethargic during exam.   Denies fever, cp, sob, abd pain, N/V/D/C.     ED course:   Vitals: T 98 HR 94 /66 RR 18 SpO2 98% RA  Labs: glucose 129   Given: Zofran    38 year old female with pmhx of Hashimoto's disease, hypothyroidism, hemochromatosis, came in complaining of severe headache and bilateral numbness sensation. On 10/16 patient had right sided numbness and weakness and went to [facility]. After extensive workup, she was diagnosed with viral meningitis and was treated with acyclovir. She was discharged from [facility] on 10/19. On 10/22, patient complained of right sided weakness and numbness and went back to [facility]. After discharge she returned to the hospital on 10/23 complaining of bilateral weakness radiating to her legs. She was admitted and had complete stroke workup including CT, MRI, EEG which were all negative. She was discharged 10/27. On 10/31 she returned again to [facility] with similar complaints and was discharged from the ED. On 11/2 she presented to [facility] ED complaining of headache. She was discharged on Maxalta and has an appt tomorrow with neurology outpatient. Patient now present with her fiancee stating that her symptoms have not resolved since 11/2 and that she has HA along b/l weakness in her face, arms and legs. Patient states she was nauseous before but that resolved after receiving Zofran but she feels weak now and has a HA. She notes that Maxalta has been helping with her headaches. Denies fever, cp, sob, abd pain, N/V/D/C.     ED course:   Vitals: T 98 HR 94 /66 RR 18 SpO2 98% RA  Labs: glucose 129   Given: Zofran

## 2023-11-07 NOTE — ED PROVIDER NOTE - NSICDXPASTMEDICALHX_GEN_ALL_CORE_FT
PAST MEDICAL HISTORY:  H/O Hashimoto thyroiditis     Hemochromatosis     Hypothyroidism     Viral meningitis     
PROVIDER:[TOKEN:[[id number]],FOLLOWUP:[Urgent]]

## 2023-11-07 NOTE — H&P ADULT - NSHPPHYSICALEXAM_GEN_ALL_CORE
T(C): 37.4 (11-07-23 @ 17:04), Max: 37.4 (11-07-23 @ 17:04)  HR: 96 (11-07-23 @ 17:04) (94 - 96)  BP: 114/74 (11-07-23 @ 17:04) (108/66 - 114/74)  RR: 17 (11-07-23 @ 17:04) (17 - 18)  SpO2: 98% (11-07-23 @ 17:04) (98% - 98%)    PHYSICAL EXAM:  GENERAL: lethargic appearing   HEAD:  Atraumatic, Normocephalic  EYES: EOMI, PERRLA, conjunctiva and sclera clear  ENT: Moist mucous membranes  NECK: Supple, No JVD  CHEST/LUNG: Clear to auscultation bilaterally; No rales, rhonchi, wheezing, or rubs. Unlabored respirations  HEART: Regular rate and rhythm; No murmurs, rubs, or gallops  ABDOMEN: Bowel sounds present; Soft, Nontender, Nondistended. No hepatomegally  EXTREMITIES:  2+ Peripheral Pulses, brisk capillary refill. No clubbing, cyanosis, or edema  NERVOUS SYSTEM:  Alert & Oriented X3, speech clear. No deficits. Sensation in upper and lower extremities. ROM intact in upper and lower extremities   SKIN: No rashes or lesions

## 2023-11-07 NOTE — H&P ADULT - PROBLEM SELECTOR PLAN 1
Patient presents with HA, weakness, and b/l numbness in face, UE and LE   - Recently diagnosed with viral meningitis 10/16, was on acyclovir   - Patient with multiple admissions and ED visits with similar complaints   - Stroke workup including CT, MRI and EEG all negative from recent admission in [facility] 10/23-/10/27   - Will continue PRN Zofran and Maxalta (patient's partner to bring from home) for nausea and headaches  - Neuro checks ordered q6 hours  - Neuro, Dr. Bowen consulted, appreciate recs Patient presents with HA, weakness, and b/l numbness in face, UE and LE   - Recently diagnosed with viral meningitis 10/16, was on acyclovir   - Patient with multiple admissions and ED visits with similar complaints   - Stroke workup including CT, MRI and EEG all negative from recent admission in [facility] 10/23-/10/27   - Will repeat CT head   - Will continue PRN Zofran and Maxalta (patient's partner to bring from home) for nausea and headaches  - Neuro checks ordered q6 hours  - Neuro, Dr. Bowen consulted, appreciate recs

## 2023-11-07 NOTE — ED PROVIDER NOTE - ATTENDING APP SHARED VISIT CONTRIBUTION OF CARE
This was a shared visit with OMAR. I reviewed and verified the documentation and independently performed the documented MDM.

## 2023-11-07 NOTE — ED ADULT NURSE NOTE - NSFALLUNIVINTERV_ED_ALL_ED
Bed/Stretcher in lowest position, wheels locked, appropriate side rails in place/Call bell, personal items and telephone in reach/Instruct patient to call for assistance before getting out of bed/chair/stretcher/Non-slip footwear applied when patient is off stretcher/[place] to call system/Physically safe environment - no spills, clutter or unnecessary equipment/Purposeful proactive rounding/Room/bathroom lighting operational, light cord in reach

## 2023-11-07 NOTE — PATIENT PROFILE ADULT - FALL HARM RISK - RISK INTERVENTIONS

## 2023-11-07 NOTE — ED PROVIDER NOTE - PHYSICAL EXAMINATION
Patient appears tired and needed to be prompted multiple times to follow commands but able to follow commands.  All extremities strength 5/5.

## 2023-11-07 NOTE — H&P ADULT - ASSESSMENT
38 year old female with pmhx of Hashimoto's disease, hypothyroidism, hemochromatosis, came in complaining of severe headache and bilateral numbness sensation. On 10/16 patient had right sided numbness and weakness and went to [facility].  Admit for headaches and weakness.

## 2023-11-07 NOTE — H&P ADULT - ATTENDING COMMENTS
38 year old female with pmhx of Hashimoto's disease, hypothyroidism, hemochromatosis, came in complaining of severe headache and bilateral numbness sensation. On 10/16 patient had right sided numbness and weakness and went to [facility].  Admit for headaches and weakness.    HPI as above.     T(C): 37.4 (11-07-23 @ 17:04), Max: 37.4 (11-07-23 @ 17:04)  HR: 96 (11-07-23 @ 17:04) (94 - 96)  BP: 114/74 (11-07-23 @ 17:04) (108/66 - 114/74)  RR: 17 (11-07-23 @ 17:04) (17 - 18)  SpO2: 98% (11-07-23 @ 17:04) (98% - 98%)  Wt(kg): --    Physical Exam:   GENERAL: well-groomed, well-developed, tired, but arousable and answers questions and follows commands  HEENT: head NC/AT; EOM intact, PERRLA, conjunctiva & sclera clear; hearing grossly intact, moist mucous membranes  NECK: supple, no JVD  RESPIRATORY: CTA B/L, no wheezing, rales, rhonchi or rubs  CARDIOVASCULAR: S1&S2, RRR, no murmurs or gallops  ABDOMEN: soft, non-tender, non-distended, + Bowel sounds x4 quadrants, no guarding, rebound or rigidity  MUSCULOSKELETAL:  no clubbing, cyanosis or edema of all 4 extremities  LYMPH: no cervical lymphadenopathy  VASCULAR: Radial pulses 2+ bilaterally, no varicose veins   SKIN: warm and dry, color normal  NEUROLOGIC: AA&O X3, CN2-12 intact w/ no focal deficits, no sensory loss, motor Strength 5/5 in UE & LE B/L    Plan:   Headache: Obtain CT head.   -tylenol prn  -do not suspect CVA/TIA  -likely migraine.     remainder as above.

## 2023-11-07 NOTE — ED ADULT NURSE REASSESSMENT NOTE - NS ED NURSE REASSESS COMMENT FT1
Pt notes increase in anxiousness and agitation, pt noted to be restless on stretcher, pt noted to have puled fist RH #22 lily, dressing applied, ED provider noted. Second 20 lily iv placed in LAC, pt tolerated well, flushed without difficulty. medication being adm. Pt notes to still be anxious and pulling at lines pt notes to have pulled line a blood noted on pt gown and stretcher, pt notes to be more anxious, a harm to self and staff, code grey called and patient assisted to stretcher, RAC #20 lily placed, emotional support provided to family bedside, pt medicated per ED provider, pt placed on bedside cardiac monitor, HOB elevated pt safety checks completed, will continue to monitor pt. Shanta PADILLA

## 2023-11-07 NOTE — ED PROVIDER NOTE - DIFFERENTIAL DIAGNOSIS
Migraine, electrolyte abnormality, intracranial hemorrhage, doubt meningitis.  Anxiety/depression may be playing a role. Differential Diagnosis

## 2023-11-08 LAB
ALBUMIN SERPL ELPH-MCNC: 3.9 G/DL — SIGNIFICANT CHANGE UP (ref 3.3–5)
ALBUMIN SERPL ELPH-MCNC: 3.9 G/DL — SIGNIFICANT CHANGE UP (ref 3.3–5)
ALP SERPL-CCNC: 56 U/L — SIGNIFICANT CHANGE UP (ref 40–120)
ALP SERPL-CCNC: 56 U/L — SIGNIFICANT CHANGE UP (ref 40–120)
ALT FLD-CCNC: 17 U/L — SIGNIFICANT CHANGE UP (ref 12–78)
ALT FLD-CCNC: 17 U/L — SIGNIFICANT CHANGE UP (ref 12–78)
ANION GAP SERPL CALC-SCNC: 6 MMOL/L — SIGNIFICANT CHANGE UP (ref 5–17)
ANION GAP SERPL CALC-SCNC: 6 MMOL/L — SIGNIFICANT CHANGE UP (ref 5–17)
AST SERPL-CCNC: 8 U/L — LOW (ref 15–37)
AST SERPL-CCNC: 8 U/L — LOW (ref 15–37)
B BURGDOR C6 AB SER-ACNC: NEGATIVE — SIGNIFICANT CHANGE UP
B BURGDOR C6 AB SER-ACNC: NEGATIVE — SIGNIFICANT CHANGE UP
B BURGDOR IGG+IGM SER-ACNC: 0.68 INDEX — SIGNIFICANT CHANGE UP (ref 0.01–0.89)
B BURGDOR IGG+IGM SER-ACNC: 0.68 INDEX — SIGNIFICANT CHANGE UP (ref 0.01–0.89)
BILIRUB SERPL-MCNC: 0.6 MG/DL — SIGNIFICANT CHANGE UP (ref 0.2–1.2)
BILIRUB SERPL-MCNC: 0.6 MG/DL — SIGNIFICANT CHANGE UP (ref 0.2–1.2)
BUN SERPL-MCNC: 7 MG/DL — SIGNIFICANT CHANGE UP (ref 7–23)
BUN SERPL-MCNC: 7 MG/DL — SIGNIFICANT CHANGE UP (ref 7–23)
CALCIUM SERPL-MCNC: 8.7 MG/DL — SIGNIFICANT CHANGE UP (ref 8.5–10.1)
CALCIUM SERPL-MCNC: 8.7 MG/DL — SIGNIFICANT CHANGE UP (ref 8.5–10.1)
CHLORIDE SERPL-SCNC: 107 MMOL/L — SIGNIFICANT CHANGE UP (ref 96–108)
CHLORIDE SERPL-SCNC: 107 MMOL/L — SIGNIFICANT CHANGE UP (ref 96–108)
CO2 SERPL-SCNC: 27 MMOL/L — SIGNIFICANT CHANGE UP (ref 22–31)
CO2 SERPL-SCNC: 27 MMOL/L — SIGNIFICANT CHANGE UP (ref 22–31)
CREAT SERPL-MCNC: 0.79 MG/DL — SIGNIFICANT CHANGE UP (ref 0.5–1.3)
CREAT SERPL-MCNC: 0.79 MG/DL — SIGNIFICANT CHANGE UP (ref 0.5–1.3)
EGFR: 98 ML/MIN/1.73M2 — SIGNIFICANT CHANGE UP
EGFR: 98 ML/MIN/1.73M2 — SIGNIFICANT CHANGE UP
GLUCOSE SERPL-MCNC: 107 MG/DL — HIGH (ref 70–99)
GLUCOSE SERPL-MCNC: 107 MG/DL — HIGH (ref 70–99)
HCT VFR BLD CALC: 39 % — SIGNIFICANT CHANGE UP (ref 34.5–45)
HCT VFR BLD CALC: 39 % — SIGNIFICANT CHANGE UP (ref 34.5–45)
HGB BLD-MCNC: 13.1 G/DL — SIGNIFICANT CHANGE UP (ref 11.5–15.5)
HGB BLD-MCNC: 13.1 G/DL — SIGNIFICANT CHANGE UP (ref 11.5–15.5)
MCHC RBC-ENTMCNC: 30.6 PG — SIGNIFICANT CHANGE UP (ref 27–34)
MCHC RBC-ENTMCNC: 30.6 PG — SIGNIFICANT CHANGE UP (ref 27–34)
MCHC RBC-ENTMCNC: 33.6 GM/DL — SIGNIFICANT CHANGE UP (ref 32–36)
MCHC RBC-ENTMCNC: 33.6 GM/DL — SIGNIFICANT CHANGE UP (ref 32–36)
MCV RBC AUTO: 91.1 FL — SIGNIFICANT CHANGE UP (ref 80–100)
MCV RBC AUTO: 91.1 FL — SIGNIFICANT CHANGE UP (ref 80–100)
NRBC # BLD: 0 /100 WBCS — SIGNIFICANT CHANGE UP (ref 0–0)
NRBC # BLD: 0 /100 WBCS — SIGNIFICANT CHANGE UP (ref 0–0)
PLATELET # BLD AUTO: 302 K/UL — SIGNIFICANT CHANGE UP (ref 150–400)
PLATELET # BLD AUTO: 302 K/UL — SIGNIFICANT CHANGE UP (ref 150–400)
POTASSIUM SERPL-MCNC: 3.7 MMOL/L — SIGNIFICANT CHANGE UP (ref 3.5–5.3)
POTASSIUM SERPL-MCNC: 3.7 MMOL/L — SIGNIFICANT CHANGE UP (ref 3.5–5.3)
POTASSIUM SERPL-SCNC: 3.7 MMOL/L — SIGNIFICANT CHANGE UP (ref 3.5–5.3)
POTASSIUM SERPL-SCNC: 3.7 MMOL/L — SIGNIFICANT CHANGE UP (ref 3.5–5.3)
PROT SERPL-MCNC: 7.3 G/DL — SIGNIFICANT CHANGE UP (ref 6–8.3)
PROT SERPL-MCNC: 7.3 G/DL — SIGNIFICANT CHANGE UP (ref 6–8.3)
RBC # BLD: 4.28 M/UL — SIGNIFICANT CHANGE UP (ref 3.8–5.2)
RBC # BLD: 4.28 M/UL — SIGNIFICANT CHANGE UP (ref 3.8–5.2)
RBC # FLD: 12.5 % — SIGNIFICANT CHANGE UP (ref 10.3–14.5)
RBC # FLD: 12.5 % — SIGNIFICANT CHANGE UP (ref 10.3–14.5)
SODIUM SERPL-SCNC: 140 MMOL/L — SIGNIFICANT CHANGE UP (ref 135–145)
SODIUM SERPL-SCNC: 140 MMOL/L — SIGNIFICANT CHANGE UP (ref 135–145)
WBC # BLD: 9.47 K/UL — SIGNIFICANT CHANGE UP (ref 3.8–10.5)
WBC # BLD: 9.47 K/UL — SIGNIFICANT CHANGE UP (ref 3.8–10.5)
WBC # FLD AUTO: 9.47 K/UL — SIGNIFICANT CHANGE UP (ref 3.8–10.5)
WBC # FLD AUTO: 9.47 K/UL — SIGNIFICANT CHANGE UP (ref 3.8–10.5)

## 2023-11-08 PROCEDURE — 70546 MR ANGIOGRAPH HEAD W/O&W/DYE: CPT | Mod: 26,59

## 2023-11-08 PROCEDURE — 70553 MRI BRAIN STEM W/O & W/DYE: CPT | Mod: 26

## 2023-11-08 PROCEDURE — 99222 1ST HOSP IP/OBS MODERATE 55: CPT

## 2023-11-08 PROCEDURE — 99232 SBSQ HOSP IP/OBS MODERATE 35: CPT

## 2023-11-08 RX ORDER — SUMATRIPTAN SUCCINATE 4 MG/.5ML
6 INJECTION, SOLUTION SUBCUTANEOUS ONCE
Refills: 0 | Status: DISCONTINUED | OUTPATIENT
Start: 2023-11-08 | End: 2023-11-13

## 2023-11-08 RX ADMIN — Medication 25 MICROGRAM(S): at 06:14

## 2023-11-08 RX ADMIN — Medication 1 CAPSULE(S): at 11:30

## 2023-11-08 RX ADMIN — Medication 1 CAPSULE(S): at 10:29

## 2023-11-08 RX ADMIN — Medication 1 CAPSULE(S): at 18:12
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
## 2023-11-08 NOTE — PROGRESS NOTE ADULT - SUBJECTIVE AND OBJECTIVE BOX
neuro cons dict  seen for severe/vomiting  etiology unclear  ?vascular HA  BRAIN MRI AND MRV WWO  IMITREX 6 SC X I  would follow up

## 2023-11-08 NOTE — PROGRESS NOTE ADULT - SUBJECTIVE AND OBJECTIVE BOX
Patient is a 38y old  Female who presents with a chief complaint of headaches, weakness (07 Nov 2023 17:56)      INTERVAL HPI/OVERNIGHT EVENTS: Patient seen and examined at bedside. Headache is better for now. No blurry vision, numbness, tingling, chest pain, palpitation, sob     MEDICATIONS  (STANDING):  influenza   Vaccine 0.5 milliLiter(s) IntraMuscular once  levothyroxine 25 MICROGram(s) Oral daily    MEDICATIONS  (PRN):  acetaminophen     Tablet .. 650 milliGRAM(s) Oral every 6 hours PRN Temp greater or equal to 38C (100.4F), Mild Pain (1 - 3)  aluminum hydroxide/magnesium hydroxide/simethicone Suspension 30 milliLiter(s) Oral every 4 hours PRN Dyspepsia  melatonin 3 milliGRAM(s) Oral at bedtime PRN Insomnia  ondansetron Injectable 4 milliGRAM(s) IV Push every 8 hours PRN Nausea and/or Vomiting  Rizatriptan 10 milliGRAM(s) 10 milliGRAM(s) Oral every 8 hours PRN headache      Allergies    penicillin (Short breath)    Intolerances        REVIEW OF SYSTEMS:  Per HPI, all other ROS noted Negative   Vital Signs Last 24 Hrs  T(C): 36.6 (08 Nov 2023 06:10), Max: 37.4 (07 Nov 2023 17:04)  T(F): 97.9 (08 Nov 2023 06:10), Max: 99.3 (07 Nov 2023 17:04)  HR: 77 (08 Nov 2023 06:10) (77 - 96)  BP: 97/62 (08 Nov 2023 06:10) (97/62 - 114/74)  BP(mean): --  RR: 17 (08 Nov 2023 06:10) (17 - 18)  SpO2: 97% (08 Nov 2023 06:10) (96% - 98%)    Parameters below as of 08 Nov 2023 06:10  Patient On (Oxygen Delivery Method): room air        PHYSICAL EXAM:  GENERAL: NAD, well-groomed, well-developed  HEAD:  Atraumatic, Normocephalic  EYES: EOMI, PERRLA, conjunctiva and sclera clear  ENMT: No tonsillar erythema, exudates, or enlargement; Moist mucous membranes, Good dentition, No lesions  NECK: Supple, No JVD, Normal thyroid  NERVOUS SYSTEM:  Alert & Oriented X3, Good concentration; Motor Strength 5/5 B/L upper and lower extremities; DTRs 2+ intact and symmetric  CHEST/LUNG: Clear to auscultation bilaterally; No rales, rhonchi, wheezing, or rubs  HEART: Regular rate and rhythm; No murmurs, rubs, or gallops  ABDOMEN: Soft, Nontender, Nondistended; Bowel sounds present  EXTREMITIES:  2+ Peripheral Pulses, No clubbing, cyanosis, or edema  LYMPH: No lymphadenopathy noted  SKIN: No rashes or lesions    LABS:                        13.1   9.47  )-----------( 302      ( 08 Nov 2023 06:35 )             39.0     08 Nov 2023 06:35    140    |  107    |  7      ----------------------------<  107    3.7     |  27     |  0.79     Ca    8.7        08 Nov 2023 06:35  Mg     2.2       07 Nov 2023 11:30    TPro  7.3    /  Alb  3.9    /  TBili  0.6    /  DBili  x      /  AST  8      /  ALT  17     /  AlkPhos  56     08 Nov 2023 06:35      CAPILLARY BLOOD GLUCOSE      POCT Blood Glucose.: 145 mg/dL (07 Nov 2023 12:03)  POCT Blood Glucose.: 120 mg/dL (07 Nov 2023 10:25)    BLOOD CULTURE    RADIOLOGY & ADDITIONAL TESTS:    Imaging Personally Reviewed:  [ ] YES     Consultant(s) Notes Reviewed:      Care Discussed with Consultants/Other Providers:

## 2023-11-08 NOTE — PROGRESS NOTE ADULT - PROBLEM SELECTOR PLAN 1
Patient presents with HA, weakness, and b/l numbness in face, UE and LE   - Recently diagnosed with viral meningitis 10/16, was on acyclovir   - Patient with multiple admissions and ED visits with similar complaints   - Stroke workup including CT, MRI and EEG all negative from recent admission in [facility] 10/23-/10/27   - Will repeat CT head   - Will continue PRN Zofran and Maxalta (patient's partner to bring from home) for nausea and headaches  - Neuro checks ordered q6 hours  -Fioricet PRN severe headaches   - Neuro, Dr. Bowen consulted

## 2023-11-09 ENCOUNTER — TRANSCRIPTION ENCOUNTER (OUTPATIENT)
Age: 38
End: 2023-11-09

## 2023-11-09 PROCEDURE — 99232 SBSQ HOSP IP/OBS MODERATE 35: CPT

## 2023-11-09 RX ORDER — ONDANSETRON 8 MG/1
1 TABLET, FILM COATED ORAL
Refills: 0 | DISCHARGE

## 2023-11-09 RX ORDER — SUMATRIPTAN SUCCINATE 4 MG/.5ML
1 INJECTION, SOLUTION SUBCUTANEOUS
Qty: 6 | Refills: 0
Start: 2023-11-09 | End: 2023-11-11

## 2023-11-09 RX ADMIN — Medication 1 CAPSULE(S): at 06:40

## 2023-11-09 RX ADMIN — Medication 1 CAPSULE(S): at 15:34

## 2023-11-09 RX ADMIN — Medication 1 CAPSULE(S): at 21:07

## 2023-11-09 RX ADMIN — Medication 25 MICROGRAM(S): at 05:45

## 2023-11-09 RX ADMIN — Medication 1 CAPSULE(S): at 14:34

## 2023-11-09 RX ADMIN — Medication 1 CAPSULE(S): at 05:45

## 2023-11-09 NOTE — DISCHARGE NOTE NURSING/CASE MANAGEMENT/SOCIAL WORK - NSDCPEFALRISK_GEN_ALL_CORE
For information on Fall & Injury Prevention, visit: [email or web address] OR  [email or web address] OR  https://www.cdc.gov/steadi/patient.html

## 2023-11-09 NOTE — CARE COORDINATION ASSESSMENT. - NSPASTMEDSURGHISTORY_GEN_ALL_CORE_FT
PAST MEDICAL & SURGICAL HISTORY:  No significant past surgical history      Hemochromatosis      Viral meningitis      Hypothyroidism      H/O Hashimoto thyroiditis
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
## 2023-11-09 NOTE — CARE COORDINATION ASSESSMENT. - OTHER PERTINENT DISCHARGE PLANNING INFORMATION:
Met with patient and isaiah at bedside. Patient consent to conversation with isaiah Ayon present. Role of CM/transition planning explained. Patient and isaiah verbalize understanding. Transition information provided. Patient lives in private house w/ isaiah Ayon, [address]. Patient is independent wityh all ADL's and ambulation, drives PTA. No needs/HCS present PTA. No new needs anticipated at this time. [facility] will transport home on DC. CM will continue to follow throughout hospital stay. CM contact information provided

## 2023-11-09 NOTE — DISCHARGE NOTE NURSING/CASE MANAGEMENT/SOCIAL WORK - PATIENT PORTAL LINK FT
You can access the FollowMyHealth Patient Portal offered by [facility] by registering at the following website: [email or web address]. By joining [facility]’s FollowMyHealth portal, you will also be able to view your health information using other applications (apps) compatible with our system.

## 2023-11-09 NOTE — DISCHARGE NOTE PROVIDER - HOSPITAL COURSE
38 year old female with pmhx of Hashimoto's disease, hypothyroidism, hemochromatosis, came in complaining of severe headache and bilateral numbness sensation. On 10/16 patient had right sided numbness and weakness and went to [facility].  Admitted for headaches and weakness, now resolved. Work up is negative including MRI/MRV, Serology. Seen by ID and Neuro Dr. Davis. Neuro recommended d/c on Sumatriptan PRN. Seen and examined at bedside. VSS    GENERAL: NAD, well-groomed, well-developed  HEAD:  Atraumatic, Normocephalic  EYES: EOMI, PERRLA, conjunctiva and sclera clear  ENMT: No tonsillar erythema, exudates, or enlargement; Moist mucous membranes, Good dentition, No lesions  NECK: Supple, No JVD, Normal thyroid  NERVOUS SYSTEM:  Alert & Oriented X3, Good concentration; Motor Strength 5/5 B/L upper and lower extremities; DTRs 2+ intact and symmetric  CHEST/LUNG: Clear to auscultation bilaterally; No rales, rhonchi, wheezing, or rubs  HEART: Regular rate and rhythm; No murmurs, rubs, or gallops  ABDOMEN: Soft, Nontender, Nondistended; Bowel sounds present  EXTREMITIES:  2+ Peripheral Pulses, No clubbing, cyanosis, or edema  LYMPH: No lymphadenopathy noted  SKIN: No rashes or lesions      Total discharge time spent 50 minutes including medication reconciliation, counseling and education, prescription writing 38 year old female with pmhx of Hashimoto's disease, hypothyroidism, hemochromatosis, came in complaining of severe headache and bilateral numbness sensation. On 10/16 patient had right sided numbness and weakness and went to [facility].  Admitted for headaches and weakness, now resolved. Work up is negative including MRI/MRV, Serology, tick born disease serology also negative. CT cervical spine unremarkable.  Seen by ID and Neuro Dr. Davis. Cleared for discharge and outpatient follow up. Seen and examined at bedside. VSS    GENERAL: NAD, well-groomed, well-developed  HEAD:  Atraumatic, Normocephalic  EYES: EOMI, PERRLA, conjunctiva and sclera clear  ENMT: No tonsillar erythema, exudates, or enlargement; Moist mucous membranes, Good dentition, No lesions  NECK: Supple, No JVD, Normal thyroid  NERVOUS SYSTEM:  Alert & Oriented X3, Good concentration; Motor Strength 5/5 B/L upper and lower extremities; DTRs 2+ intact and symmetric  CHEST/LUNG: Clear to auscultation bilaterally; No rales, rhonchi, wheezing, or rubs  HEART: Regular rate and rhythm; No murmurs, rubs, or gallops  ABDOMEN: Soft, Nontender, Nondistended; Bowel sounds present  EXTREMITIES:  2+ Peripheral Pulses, No clubbing, cyanosis, or edema  LYMPH: No lymphadenopathy noted  SKIN: No rashes or lesions        Total discharge time spent 50 minutes including medication reconciliation, counseling and education, prescription writing

## 2023-11-09 NOTE — PROGRESS NOTE ADULT - SUBJECTIVE AND OBJECTIVE BOX
Patient is a 38y old  Female who presents with a chief complaint of headaches, weakness (09 Nov 2023 10:01)       INTERVAL HPI/OVERNIGHT EVENTS: Patient seen and examined at bedside. Headaches is better but she does not want to be discharged until work up completed, including tick born disease serology that was ordered today by JORGE Fofana. Denies chest pain, palpitation, sob, N/V/D/ abdominal pain     MEDICATIONS  (STANDING):  influenza   Vaccine 0.5 milliLiter(s) IntraMuscular once  levothyroxine 25 MICROGram(s) Oral daily  SUMAtriptan Injectable 6 milliGRAM(s) SubCutaneous once    MEDICATIONS  (PRN):  acetaminophen     Tablet .. 650 milliGRAM(s) Oral every 6 hours PRN Temp greater or equal to 38C (100.4F), Mild Pain (1 - 3)  acetaminophen 300 mG/butalbital 50 mG/ caffeine 40 mG 1 Capsule(s) Oral every 6 hours PRN severe headache  aluminum hydroxide/magnesium hydroxide/simethicone Suspension 30 milliLiter(s) Oral every 4 hours PRN Dyspepsia  melatonin 3 milliGRAM(s) Oral at bedtime PRN Insomnia  ondansetron Injectable 4 milliGRAM(s) IV Push every 8 hours PRN Nausea and/or Vomiting  Rizatriptan 10 milliGRAM(s) 10 milliGRAM(s) Oral every 8 hours PRN headache      Allergies    penicillin (Short breath)    Intolerances        REVIEW OF SYSTEMS:  Per HPI, all other ROS noted Negative   Vital Signs Last 24 Hrs  T(C): 36.7 (09 Nov 2023 11:20), Max: 36.8 (08 Nov 2023 19:58)  T(F): 98.1 (09 Nov 2023 11:20), Max: 98.3 (08 Nov 2023 19:58)  HR: 68 (09 Nov 2023 11:20) (68 - 82)  BP: 103/71 (09 Nov 2023 11:20) (103/70 - 108/70)  BP(mean): --  RR: 17 (09 Nov 2023 11:20) (17 - 18)  SpO2: 97% (09 Nov 2023 11:20) (97% - 98%)    Parameters below as of 09 Nov 2023 11:20  Patient On (Oxygen Delivery Method): room air        PHYSICAL EXAM:  GENERAL: NAD, well-groomed, well-developed  HEAD:  Atraumatic, Normocephalic  EYES: EOMI, PERRLA, conjunctiva and sclera clear  ENMT: No tonsillar erythema, exudates, or enlargement; Moist mucous membranes, Good dentition, No lesions  NECK: Supple, No JVD, Normal thyroid  NERVOUS SYSTEM:  Alert & Oriented X3, Good concentration; Motor Strength 5/5 B/L upper and lower extremities; DTRs 2+ intact and symmetric  CHEST/LUNG: Clear to auscultation bilaterally; No rales, rhonchi, wheezing, or rubs  HEART: Regular rate and rhythm; No murmurs, rubs, or gallops  ABDOMEN: Soft, Nontender, Nondistended; Bowel sounds present  EXTREMITIES:  2+ Peripheral Pulses, No clubbing, cyanosis, or edema  LYMPH: No lymphadenopathy noted  SKIN: No rashes or lesions    LABS:      Ca    8.7        08 Nov 2023 06:35        CAPILLARY BLOOD GLUCOSE        BLOOD CULTURE    RADIOLOGY & ADDITIONAL TESTS:    Imaging Personally Reviewed:  [ ] YES     Consultant(s) Notes Reviewed:      Care Discussed with Consultants/Other Providers:

## 2023-11-09 NOTE — PATIENT CHOICE NOTE. - NSPTCHOICESTATE_GEN_ALL_CORE

## 2023-11-09 NOTE — PROGRESS NOTE ADULT - SUBJECTIVE AND OBJECTIVE BOX
Neurology Follow up note    MORE ANDREW38yFemale    HPI:  38 year old female with pmhx of Hashimoto's disease, hypothyroidism, hemochromatosis, came in complaining of severe headache and bilateral numbness sensation. On 10/16 patient had right sided numbness and weakness and went to [facility]. After extensive workup, she was diagnosed with viral meningitis and was treated with acyclovir. She was discharged from [facility] on 10/19. On 10/22, patient complained of right sided weakness and numbness and went back to [facility]. After discharge she returned to the hospital on 10/23 complaining of bilateral weakness radiating to her legs. She was admitted and had complete stroke workup including CT, MRI, EEG which were all negative. She was discharged 10/27. On 10/31 she returned again to [facility] with similar complaints and was discharged from the ED. On 11/2 she presented to [facility] ED complaining of headache. She was discharged on Maxalta and has an appt tomorrow with neurology outpatient. Patient now present with her fiancee stating that her symptoms have not resolved since 11/2 and that she has HA along b/l weakness in her face, arms and legs. Patient states she was nauseous before but that resolved after receiving Zofran but she feels weak now and has a HA. She notes that Maxalta has been helping with her headaches. Denies fever, cp, sob, abd pain, N/V/D/C.     ED course:   Vitals: T 98 HR 94 /66 RR 18 SpO2 98% RA  Labs: glucose 129   Given: Zofran    (07 Nov 2023 17:56)      Interval History -ha improving    Patient is seen, chart was reviewed and case was discussed with the treatment team.  Pt is not in any distress.   Lying on bed comfortably.   No events reported overnight.   .    Vital Signs Last 24 Hrs  T(C): 36.7 (09 Nov 2023 11:20), Max: 36.8 (08 Nov 2023 19:58)  T(F): 98.1 (09 Nov 2023 11:20), Max: 98.3 (08 Nov 2023 19:58)  HR: 68 (09 Nov 2023 11:20) (68 - 82)  BP: 103/71 (09 Nov 2023 11:20) (103/70 - 108/70)  BP(mean): --  RR: 17 (09 Nov 2023 11:20) (17 - 18)  SpO2: 97% (09 Nov 2023 11:20) (97% - 98%)    Parameters below as of 09 Nov 2023 11:20  Patient On (Oxygen Delivery Method): room air            REVIEW OF SYSTEMS:    Constitutional: No fever, weight loss or fatigue  Eyes: No eye pain, visual disturbances, or discharge  ENT:  No difficulty hearing, tinnitus, vertigo; No sinus or throat pain  Neck: No pain or stiffness  Respiratory: No cough, wheezing, chills or hemoptysis  Cardiovascular: No chest pain, palpitations, shortness of breath, dizziness or leg swelling  Gastrointestinal: No abdominal or epigastric pain. No nausea, vomiting or hematemesis; No diarrhea or constipation. No melena or hematochezia.  Genitourinary: No dysuria, frequency, hematuria or incontinence  Neurological: No , memory loss, loss of strength, numbness or tremors  Psychiatric: No depression, anxiety, mood swings or difficulty sleeping  Musculoskeletal: No joint pain or swelling; No muscle, back or extremity pain  Skin: No itching, burning, rashes or lesions   Lymph Nodes: No enlarged glands  Endocrine: No heat or cold intolerance; No hair loss,   Allergy and Immunologic: No hives or eczema    On Neurological Examination:    Mental Status - Pt is alert, awake, oriented X3. Higher functions are intact. Follows commands well and able to answer questions appropriately.Mood and affect  normal    Speech -  Normal. .    Cranial Nerves - Pupils 3 mm equal and reactive to light, extraocular eye movements intact. Pt has no visual field deficit.  Pt has no facial asymmetry. Facial sensation is intact.Tongue - is in midline.    Muscle tone - is normal all over. Moves all extremities equally. No asymmetry is seen.      Motor Exam - 5/5 all over, No drift. No shaking or tremors.    Sensory Exam - Pin prick, temperature, joint position and vibration are intact on either side. Pt withdraws all extremities equally on stimulation. No asymmetry seen. No complaints of tingling, numbness.    Gait - Able to stand and walk unassisted.        coordination:    Finger to nose: normal    Heel to shin: normal    Deep tendon Reflexes - 2 plus all over.        Romberg - Negative.    Neck Supple -  Yes.     MEDICATIONS    acetaminophen     Tablet .. 650 milliGRAM(s) Oral every 6 hours PRN  acetaminophen 300 mG/butalbital 50 mG/ caffeine 40 mG 1 Capsule(s) Oral every 6 hours PRN  aluminum hydroxide/magnesium hydroxide/simethicone Suspension 30 milliLiter(s) Oral every 4 hours PRN  influenza   Vaccine 0.5 milliLiter(s) IntraMuscular once  levothyroxine 25 MICROGram(s) Oral daily  melatonin 3 milliGRAM(s) Oral at bedtime PRN  ondansetron Injectable 4 milliGRAM(s) IV Push every 8 hours PRN  Rizatriptan 10 milliGRAM(s) 10 milliGRAM(s) Oral every 8 hours PRN  SUMAtriptan Injectable 6 milliGRAM(s) SubCutaneous once      Allergies    penicillin (Short breath)    Intolerances        LABS:  CBC Full  -  ( 08 Nov 2023 06:35 )  WBC Count : 9.47 K/uL  RBC Count : 4.28 M/uL  Hemoglobin : 13.1 g/dL  Hematocrit : 39.0 %  Platelet Count - Automated : 302 K/uL  Mean Cell Volume : 91.1 fl  Mean Cell Hemoglobin : 30.6 pg  Mean Cell Hemoglobin Concentration : 33.6 gm/dL  Auto Neutrophil # : x  Auto Lymphocyte # : x  Auto Monocyte # : x  Auto Eosinophil # : x  Auto Basophil # : x  Auto Neutrophil % : x  Auto Lymphocyte % : x  Auto Monocyte % : x  Auto Eosinophil % : x  Auto Basophil % : x    Urinalysis Basic - ( 08 Nov 2023 06:35 )    Color: x / Appearance: x / SG: x / pH: x  Gluc: 107 mg/dL / Ketone: x  / Bili: x / Urobili: x   Blood: x / Protein: x / Nitrite: x   Leuk Esterase: x / RBC: x / WBC x   Sq Epi: x / Non Sq Epi: x / Bacteria: x      11-08    140  |  107  |  7   ----------------------------<  107<H>  3.7   |  27  |  0.79    Ca    8.7      08 Nov 2023 06:35    TPro  7.3  /  Alb  3.9  /  TBili  0.6  /  DBili  x   /  AST  8<L>  /  ALT  17  /  AlkPhos  56  11-08    Hemoglobin A1C:     Vitamin B12     RADIOLOGY    ASSESSMENT AND PLAN:      seen for likely migrain  repeat brain mri wwo- negative  MRV -no sinuses thrombosis    imitrex prn  tick panel is pending  Physical therapy evaluation.  OOB to chair/ambulation with assistance only.  Pain is accessed and addressed.  Would continue to follow.

## 2023-11-09 NOTE — DISCHARGE NOTE PROVIDER - NSDCMRMEDTOKEN_GEN_ALL_CORE_FT
levothyroxine 25 mcg (0.025 mg) oral tablet: 1 tab(s) orally once a day  SUMAtriptan 100 mg oral tablet: 1 tab(s) orally 2 times a day as needed for  headache   levothyroxine 25 mcg (0.025 mg) oral tablet: 1 tab(s) orally once a day  SUMAtriptan 100 mg oral tablet: 1 tab(s) orally 2 times a day as needed for  headache  topiramate 25 mg oral tablet: 1 tab(s) orally once a day

## 2023-11-09 NOTE — DISCHARGE NOTE PROVIDER - CARE PROVIDERS DIRECT ADDRESSES
,DirectAddress_Unknown,[email or web address]

## 2023-11-09 NOTE — DISCHARGE NOTE PROVIDER - NSDCCPCAREPLAN_GEN_ALL_CORE_FT
PRINCIPAL DISCHARGE DIAGNOSIS  Diagnosis: Acute headache  Assessment and Plan of Treatment: MRI is negative  you wre seen by ID and Neuro  Serology is also netgative  please f/u with Neuro as out patient   Please f/u with ID as outpatient if you have any questions or concerns     PRINCIPAL DISCHARGE DIAGNOSIS  Diagnosis: Acute headache  Assessment and Plan of Treatment: MRI is negative  you wre seen by ID and Neuro  Serology is also negative   please f/u with Neuro and ID as out patient   please talk to your PCP for any questions or concenrs

## 2023-11-09 NOTE — DISCHARGE NOTE PROVIDER - CARE PROVIDER_API CALL
Yandel Bowen  Neurology  [address] [place] [address]  Phone: [phone]  Fax: [phone]  Follow Up Time:     Toñito Fofana  Infectious Disease  [address] [place] [id number]  Phone: [phone]  Fax: [phone]  Follow Up Time:

## 2023-11-09 NOTE — CARE COORDINATION ASSESSMENT. - NSCAREPROVIDERS_GEN_ALL_CORE_FT
CARE PROVIDERS:  Accepting Physician: Gilbert Hernandez  Access Services: Luna Oconnor  Administration: Charlotte Ballesteros  Administration: Hermes Elias  Admitting: Gilbert Hernandez  Attending: Do Sibley  Case Management: Nancy Hanna  Consultant: Yandel Bowen  Consultant: Ava Mead  Covering Team: Gustavo Yee  ED ACP: Toby Dick  ED Attending: Nima Caba  ED Nurse: Madelyn Buchanan  Nurse: Oma Aldridge  Nurse: Inna Arguello  Nurse: Lulú Carrillo  Nurse: Lakeisha Bright  Ordered: ADM, User  Ordered: Toñito Fofana  Override: Dillon Almendarez  Override: Mel Ricketts  Override: Ubaldo Bright  PCA/Nursing Assistant: Ubaldo Bright  PCA/Nursing Assistant: Mel Ricketts  PCA/Nursing Assistant: Flory Johnston  Primary Team: Do Sibley  Primary Team: Rosa Hameed  Primary Team: Gilbert Hernandez  Registered Dietitian: Danna Powell  Team: [facility], Team

## 2023-11-09 NOTE — PROGRESS NOTE ADULT - PROBLEM SELECTOR PLAN 1
Patient presents with HA, weakness, and b/l numbness in face, UE and LE   - Recently diagnosed with viral meningitis 10/16, was on acyclovir and has intermittent headaches since then   - Patient with multiple admissions and ED visits with similar complaints   - Stroke workup including CT, MRI and EEG all negative from recent admission in [facility] 10/23-/10/27   - MRI, MRV from 11/08/2023 all negative   -Serology is also negative   - Will continue PRN Zofran  -Sumatriptan PRN   -Fioricet PRN severe headaches   - Neuro, Dr. Bowen cleared patient for discharge   -ID saw patient, patient wants to wait until tick born serology is performed and comes back as negative.

## 2023-11-10 PROCEDURE — 99231 SBSQ HOSP IP/OBS SF/LOW 25: CPT

## 2023-11-10 PROCEDURE — 99233 SBSQ HOSP IP/OBS HIGH 50: CPT

## 2023-11-10 RX ORDER — SUMATRIPTAN SUCCINATE 4 MG/.5ML
6 INJECTION, SOLUTION SUBCUTANEOUS ONCE
Refills: 0 | Status: COMPLETED | OUTPATIENT
Start: 2023-11-10 | End: 2023-11-10

## 2023-11-10 RX ADMIN — Medication 1 CAPSULE(S): at 03:14

## 2023-11-10 RX ADMIN — Medication 25 MICROGRAM(S): at 05:44

## 2023-11-10 RX ADMIN — Medication 1 CAPSULE(S): at 17:50

## 2023-11-10 RX ADMIN — Medication 1 CAPSULE(S): at 11:46

## 2023-11-10 RX ADMIN — Medication 1 CAPSULE(S): at 10:46

## 2023-11-10 RX ADMIN — Medication 1 CAPSULE(S): at 21:47

## 2023-11-10 RX ADMIN — SUMATRIPTAN SUCCINATE 6 MILLIGRAM(S): 4 INJECTION, SOLUTION SUBCUTANEOUS at 12:59

## 2023-11-10 RX ADMIN — Medication 1 CAPSULE(S): at 20:47

## 2023-11-10 RX ADMIN — SUMATRIPTAN SUCCINATE 6 MILLIGRAM(S): 4 INJECTION, SOLUTION SUBCUTANEOUS at 11:59

## 2023-11-10 NOTE — PROGRESS NOTE ADULT - SUBJECTIVE AND OBJECTIVE BOX
[facility]  INFECTIOUS DISEASES - [address], [place] [address]  Tel: [phone]     Fax: [phone]  =======================================================    MORE ANDREW [id number]    Follow up: ID called back to answer patient's questions.    Allergies:  penicillin (Short breath)      Antibiotics:  acetaminophen     Tablet .. 650 milliGRAM(s) Oral every 6 hours PRN  acetaminophen 300 mG/butalbital 50 mG/ caffeine 40 mG 1 Capsule(s) Oral every 6 hours PRN  aluminum hydroxide/magnesium hydroxide/simethicone Suspension 30 milliLiter(s) Oral every 4 hours PRN  influenza   Vaccine 0.5 milliLiter(s) IntraMuscular once  levothyroxine 25 MICROGram(s) Oral daily  melatonin 3 milliGRAM(s) Oral at bedtime PRN  ondansetron Injectable 4 milliGRAM(s) IV Push every 8 hours PRN  Rizatriptan 10 milliGRAM(s) 10 milliGRAM(s) Oral every 8 hours PRN  SUMAtriptan Injectable 6 milliGRAM(s) SubCutaneous once       REVIEW OF SYSTEMS:  CONSTITUTIONAL:  No Fever or chills  HEENT:   No sore throat or runny nose.  CARDIOVASCULAR:  No chest pain or SOB  RESPIRATORY:  No cough, shortness of breath  GASTROINTESTINAL:  No nausea, vomiting or diarrhea.  GENITOURINARY:  No dysuria, frequency or urgency  MUSCULOSKELETAL:  no joint aches, no muscle pain  NEUROLOGIC:  (+) headache  PSYCHIATRIC:  No disorder of thought or mood.     Physical Exam:  ICU Vital Signs Last 24 Hrs  T(C): 36.6 (10 Nov 2023 12:04), Max: 37 (09 Nov 2023 19:49)  T(F): 97.9 (10 Nov 2023 12:04), Max: 98.6 (09 Nov 2023 19:49)  HR: 71 (10 Nov 2023 12:04) (71 - 78)  BP: 95/64 (10 Nov 2023 12:04) (95/64 - 97/67)  BP(mean): --  ABP: --  ABP(mean): --  RR: 18 (10 Nov 2023 12:04) (18 - 18)  SpO2: 95% (10 Nov 2023 12:04) (95% - 98%)    O2 Parameters below as of 10 Nov 2023 12:04  Patient On (Oxygen Delivery Method): room air           GEN: NAD  HEENT: normocephalic and atraumatic.  NECK: Supple.   LUNGS: Normal respiratory effort  HEART: Regular rate and rhythm   ABDOMEN: Soft, nondistended  EXTREMITIES: No leg edema.  NEUROLOGIC: grossly intact.  PSYCHIATRIC: Appropriate affect .    Labs:                  RECENT CULTURES:        All imaging and data are reviewed.

## 2023-11-10 NOTE — PROGRESS NOTE ADULT - SUBJECTIVE AND OBJECTIVE BOX
Patient is a 38y old  Female who presents with a chief complaint of headaches, weakness (09 Nov 2023 18:17)       INTERVAL HPI/OVERNIGHT EVENTS: Patient seen and examined at bedside. Had an episode of headache last night with right sided facial numbness, now resolved. Denies chest pain, palpitation, sob     MEDICATIONS  (STANDING):  influenza   Vaccine 0.5 milliLiter(s) IntraMuscular once  levothyroxine 25 MICROGram(s) Oral daily  SUMAtriptan Injectable 6 milliGRAM(s) SubCutaneous once    MEDICATIONS  (PRN):  acetaminophen     Tablet .. 650 milliGRAM(s) Oral every 6 hours PRN Temp greater or equal to 38C (100.4F), Mild Pain (1 - 3)  acetaminophen 300 mG/butalbital 50 mG/ caffeine 40 mG 1 Capsule(s) Oral every 6 hours PRN severe headache  aluminum hydroxide/magnesium hydroxide/simethicone Suspension 30 milliLiter(s) Oral every 4 hours PRN Dyspepsia  melatonin 3 milliGRAM(s) Oral at bedtime PRN Insomnia  ondansetron Injectable 4 milliGRAM(s) IV Push every 8 hours PRN Nausea and/or Vomiting  Rizatriptan 10 milliGRAM(s) 10 milliGRAM(s) Oral every 8 hours PRN headache      Allergies    penicillin (Short breath)    Intolerances        REVIEW OF SYSTEMS:  Per HPI, All other ROS noted Negative   Vital Signs Last 24 Hrs  T(C): 36.8 (10 Nov 2023 05:00), Max: 37 (09 Nov 2023 19:49)  T(F): 98.2 (10 Nov 2023 05:00), Max: 98.6 (09 Nov 2023 19:49)  HR: 78 (10 Nov 2023 05:00) (68 - 78)  BP: 96/65 (10 Nov 2023 05:00) (96/65 - 103/71)  BP(mean): --  RR: 18 (10 Nov 2023 05:00) (17 - 18)  SpO2: 97% (10 Nov 2023 05:00) (97% - 98%)    Parameters below as of 10 Nov 2023 05:00  Patient On (Oxygen Delivery Method): room air        PHYSICAL EXAM:  GENERAL: NAD, well-groomed, well-developed  HEAD:  Atraumatic, Normocephalic  EYES: EOMI, PERRLA, conjunctiva and sclera clear  ENMT: No tonsillar erythema, exudates, or enlargement; Moist mucous membranes, Good dentition, No lesions  NECK: Supple, No JVD, Normal thyroid  NERVOUS SYSTEM:  Alert & Oriented X3, Good concentration; Motor Strength 5/5 B/L upper and lower extremities  CHEST/LUNG: Clear to auscultation bilaterally; No rales, rhonchi, wheezing, or rubs  HEART: Regular rate and rhythm; No murmurs, rubs, or gallops  ABDOMEN: Soft, Nontender, Nondistended; Bowel sounds present  EXTREMITIES:  2+ Peripheral Pulses, No clubbing, cyanosis, or edema  LYMPH: No lymphadenopathy noted  SKIN: No rashes or lesions    LABS:            CAPILLARY BLOOD GLUCOSE        BLOOD CULTURE    RADIOLOGY & ADDITIONAL TESTS:    Imaging Personally Reviewed:  [ ] YES     Consultant(s) Notes Reviewed:      Care Discussed with Consultants/Other Providers:

## 2023-11-10 NOTE — PROGRESS NOTE ADULT - SUBJECTIVE AND OBJECTIVE BOX
Neurology Follow up note    MORE ANDREW38yFemale    HPI:  38 year old female with pmhx of Hashimoto's disease, hypothyroidism, hemochromatosis, came in complaining of severe headache and bilateral numbness sensation. On 10/16 patient had right sided numbness and weakness and went to [facility]. After extensive workup, she was diagnosed with viral meningitis and was treated with acyclovir. She was discharged from [facility] on 10/19. On 10/22, patient complained of right sided weakness and numbness and went back to [facility]. After discharge she returned to the hospital on 10/23 complaining of bilateral weakness radiating to her legs. She was admitted and had complete stroke workup including CT, MRI, EEG which were all negative. She was discharged 10/27. On 10/31 she returned again to [facility] with similar complaints and was discharged from the ED. On 11/2 she presented to [facility] ED complaining of headache. She was discharged on Maxalta and has an appt tomorrow with neurology outpatient. Patient now present with her fiancee stating that her symptoms have not resolved since 11/2 and that she has HA along b/l weakness in her face, arms and legs. Patient states she was nauseous before but that resolved after receiving Zofran but she feels weak now and has a HA. She notes that Maxalta has been helping with her headaches. Denies fever, cp, sob, abd pain, N/V/D/C.     ED course:   Vitals: T 98 HR 94 /66 RR 18 SpO2 98% RA  Labs: glucose 129   Given: Zofran    (07 Nov 2023 17:56)      Interval History -reported bad HA(8/10)    Patient is seen, chart was reviewed and case was discussed with the treatment team.  Pt is not in any distress.   Lying on bed comfortably.      .    Vital Signs Last 24 Hrs  T(C): 36.6 (10 Nov 2023 12:04), Max: 37 (09 Nov 2023 19:49)  T(F): 97.9 (10 Nov 2023 12:04), Max: 98.6 (09 Nov 2023 19:49)  HR: 71 (10 Nov 2023 12:04) (71 - 78)  BP: 95/64 (10 Nov 2023 12:04) (95/64 - 97/67)  BP(mean): --  RR: 18 (10 Nov 2023 12:04) (18 - 18)  SpO2: 95% (10 Nov 2023 12:04) (95% - 98%)    Parameters below as of 10 Nov 2023 12:04  Patient On (Oxygen Delivery Method): room air                REVIEW OF SYSTEMS:    Constitutional: No fever, weight loss or fatigue  Eyes: No eye pain, visual disturbances, or discharge  ENT:  No difficulty hearing, tinnitus, vertigo; No sinus or throat pain  Neck: No pain or stiffness  Respiratory: No cough, wheezing, chills or hemoptysis  Cardiovascular: No chest pain, palpitations, shortness of breath, dizziness or leg swelling  Gastrointestinal: No abdominal or epigastric pain. No nausea, vomiting or hematemesis; No diarrhea or constipation. No melena or hematochezia.  Genitourinary: No dysuria, frequency, hematuria or incontinence  Neurological: No , memory loss, loss of strength, numbness or tremors  Psychiatric: No depression, anxiety, mood swings or difficulty sleeping  Musculoskeletal: No joint pain or swelling; No muscle, back or extremity pain  Skin: No itching, burning, rashes or lesions   Lymph Nodes: No enlarged glands  Endocrine: No heat or cold intolerance; No hair loss,   Allergy and Immunologic: No hives or eczema    On Neurological Examination:    Mental Status - Pt is alert, awake, oriented X3. Higher functions are intact. Follows commands well and able to answer questions appropriately.Mood and affect  normal    Speech -  Normal. .    Cranial Nerves - Pupils 3 mm equal and reactive to light, extraocular eye movements intact. Pt has no visual field deficit.  Pt has no facial asymmetry. Facial sensation is intact.Tongue - is in midline.    Muscle tone - is normal all over. Moves all extremities equally. No asymmetry is seen.      Motor Exam - 5/5 all over, No drift. No shaking or tremors.    Sensory Exam - Pin prick, temperature, joint position and vibration are intact on either side. Pt withdraws all extremities equally on stimulation. No asymmetry seen. No complaints of tingling, numbness.    Gait - Able to stand and walk unassisted.        coordination:    Finger to nose: normal    Heel to shin: normal    Deep tendon Reflexes - 2 plus all over.        Romberg - Negative.    Neck Supple -  Yes.     MEDICATIONS    acetaminophen     Tablet .. 650 milliGRAM(s) Oral every 6 hours PRN  acetaminophen 300 mG/butalbital 50 mG/ caffeine 40 mG 1 Capsule(s) Oral every 6 hours PRN  aluminum hydroxide/magnesium hydroxide/simethicone Suspension 30 milliLiter(s) Oral every 4 hours PRN  influenza   Vaccine 0.5 milliLiter(s) IntraMuscular once  levothyroxine 25 MICROGram(s) Oral daily  melatonin 3 milliGRAM(s) Oral at bedtime PRN  ondansetron Injectable 4 milliGRAM(s) IV Push every 8 hours PRN  Rizatriptan 10 milliGRAM(s) 10 milliGRAM(s) Oral every 8 hours PRN  SUMAtriptan Injectable 6 milliGRAM(s) SubCutaneous once      Allergies    penicillin (Short breath)    Intolerances        Hemoglobin A1C:     Vitamin B12     RADIOLOGY    ASSESSMENT AND PLAN:      seen for ha/facial paresthesia- likely migraine   repeat brain mri wwo- negative  MRV -no sinuses thrombosis    imitrex 6 mg sc x1  topamax 25 mg qd  tick panel is pending  discharge planning  Physical therapy evaluation.  OOB to chair/ambulation with assistance only.  Pain is accessed and addressed.  Would continue to follow.

## 2023-11-10 NOTE — PROGRESS NOTE ADULT - PROBLEM SELECTOR PLAN 1
Patient presents with HA, weakness, and b/l numbness in face, UE and LE   - Recently diagnosed with viral meningitis 10/16, was on acyclovir and has intermittent headaches since then   - Patient with multiple admissions and ED visits with similar complaints   - Stroke workup including CT, MRI and EEG all negative from recent admission in [facility] 10/23-/10/27   - MRI, MRV from 11/08/2023 all negative   -Serology is also negative   - Will continue PRN Zofran  -Sumatriptan PRN   -Fioricet PRN severe headaches   - Neuro, Dr. Bowen cleared patient for discharge   -ID saw patient, patient wants to wait until tick born serology is performed and comes back as negative.

## 2023-11-10 NOTE — CASE MANAGEMENT PROGRESS NOTE - NSCMPROGRESSNOTE_GEN_ALL_CORE
Patient had another episode of intractable headache, seen by Neuro , ordered SQ injection of Sumatriptan. Team will continue to monitor for now. Pt is not cleared for transition home. No skilled needs anticipated.

## 2023-11-11 LAB
ANION GAP SERPL CALC-SCNC: 6 MMOL/L — SIGNIFICANT CHANGE UP (ref 5–17)
ANION GAP SERPL CALC-SCNC: 6 MMOL/L — SIGNIFICANT CHANGE UP (ref 5–17)
BUN SERPL-MCNC: 8 MG/DL — SIGNIFICANT CHANGE UP (ref 7–23)
BUN SERPL-MCNC: 8 MG/DL — SIGNIFICANT CHANGE UP (ref 7–23)
CALCIUM SERPL-MCNC: 9 MG/DL — SIGNIFICANT CHANGE UP (ref 8.5–10.1)
CALCIUM SERPL-MCNC: 9 MG/DL — SIGNIFICANT CHANGE UP (ref 8.5–10.1)
CHLORIDE SERPL-SCNC: 109 MMOL/L — HIGH (ref 96–108)
CHLORIDE SERPL-SCNC: 109 MMOL/L — HIGH (ref 96–108)
CO2 SERPL-SCNC: 27 MMOL/L — SIGNIFICANT CHANGE UP (ref 22–31)
CO2 SERPL-SCNC: 27 MMOL/L — SIGNIFICANT CHANGE UP (ref 22–31)
CREAT SERPL-MCNC: 0.77 MG/DL — SIGNIFICANT CHANGE UP (ref 0.5–1.3)
CREAT SERPL-MCNC: 0.77 MG/DL — SIGNIFICANT CHANGE UP (ref 0.5–1.3)
EGFR: 101 ML/MIN/1.73M2 — SIGNIFICANT CHANGE UP
EGFR: 101 ML/MIN/1.73M2 — SIGNIFICANT CHANGE UP
GLUCOSE SERPL-MCNC: 111 MG/DL — HIGH (ref 70–99)
GLUCOSE SERPL-MCNC: 111 MG/DL — HIGH (ref 70–99)
HCT VFR BLD CALC: 37.9 % — SIGNIFICANT CHANGE UP (ref 34.5–45)
HCT VFR BLD CALC: 37.9 % — SIGNIFICANT CHANGE UP (ref 34.5–45)
HGB BLD-MCNC: 13.1 G/DL — SIGNIFICANT CHANGE UP (ref 11.5–15.5)
HGB BLD-MCNC: 13.1 G/DL — SIGNIFICANT CHANGE UP (ref 11.5–15.5)
MCHC RBC-ENTMCNC: 30.8 PG — SIGNIFICANT CHANGE UP (ref 27–34)
MCHC RBC-ENTMCNC: 30.8 PG — SIGNIFICANT CHANGE UP (ref 27–34)
MCHC RBC-ENTMCNC: 34.6 GM/DL — SIGNIFICANT CHANGE UP (ref 32–36)
MCHC RBC-ENTMCNC: 34.6 GM/DL — SIGNIFICANT CHANGE UP (ref 32–36)
MCV RBC AUTO: 89 FL — SIGNIFICANT CHANGE UP (ref 80–100)
MCV RBC AUTO: 89 FL — SIGNIFICANT CHANGE UP (ref 80–100)
NRBC # BLD: 0 /100 WBCS — SIGNIFICANT CHANGE UP (ref 0–0)
NRBC # BLD: 0 /100 WBCS — SIGNIFICANT CHANGE UP (ref 0–0)
PLATELET # BLD AUTO: 272 K/UL — SIGNIFICANT CHANGE UP (ref 150–400)
PLATELET # BLD AUTO: 272 K/UL — SIGNIFICANT CHANGE UP (ref 150–400)
POTASSIUM SERPL-MCNC: 4.3 MMOL/L — SIGNIFICANT CHANGE UP (ref 3.5–5.3)
POTASSIUM SERPL-MCNC: 4.3 MMOL/L — SIGNIFICANT CHANGE UP (ref 3.5–5.3)
POTASSIUM SERPL-SCNC: 4.3 MMOL/L — SIGNIFICANT CHANGE UP (ref 3.5–5.3)
POTASSIUM SERPL-SCNC: 4.3 MMOL/L — SIGNIFICANT CHANGE UP (ref 3.5–5.3)
RBC # BLD: 4.26 M/UL — SIGNIFICANT CHANGE UP (ref 3.8–5.2)
RBC # BLD: 4.26 M/UL — SIGNIFICANT CHANGE UP (ref 3.8–5.2)
RBC # FLD: 12.2 % — SIGNIFICANT CHANGE UP (ref 10.3–14.5)
RBC # FLD: 12.2 % — SIGNIFICANT CHANGE UP (ref 10.3–14.5)
SODIUM SERPL-SCNC: 142 MMOL/L — SIGNIFICANT CHANGE UP (ref 135–145)
SODIUM SERPL-SCNC: 142 MMOL/L — SIGNIFICANT CHANGE UP (ref 135–145)
WBC # BLD: 8.95 K/UL — SIGNIFICANT CHANGE UP (ref 3.8–10.5)
WBC # BLD: 8.95 K/UL — SIGNIFICANT CHANGE UP (ref 3.8–10.5)
WBC # FLD AUTO: 8.95 K/UL — SIGNIFICANT CHANGE UP (ref 3.8–10.5)
WBC # FLD AUTO: 8.95 K/UL — SIGNIFICANT CHANGE UP (ref 3.8–10.5)

## 2023-11-11 PROCEDURE — 99233 SBSQ HOSP IP/OBS HIGH 50: CPT

## 2023-11-11 RX ORDER — SUMATRIPTAN SUCCINATE 4 MG/.5ML
6 INJECTION, SOLUTION SUBCUTANEOUS ONCE
Refills: 0 | Status: COMPLETED | OUTPATIENT
Start: 2023-11-11 | End: 2023-11-11

## 2023-11-11 RX ORDER — FAMOTIDINE 10 MG/ML
20 INJECTION INTRAVENOUS ONCE
Refills: 0 | Status: COMPLETED | OUTPATIENT
Start: 2023-11-11 | End: 2023-11-11

## 2023-11-11 RX ORDER — TOPIRAMATE 25 MG
25 TABLET ORAL DAILY
Refills: 0 | Status: DISCONTINUED | OUTPATIENT
Start: 2023-11-11 | End: 2023-11-13

## 2023-11-11 RX ORDER — ACETAMINOPHEN 500 MG
1000 TABLET ORAL ONCE
Refills: 0 | Status: COMPLETED | OUTPATIENT
Start: 2023-11-11 | End: 2023-11-11

## 2023-11-11 RX ADMIN — Medication 1 CAPSULE(S): at 02:40

## 2023-11-11 RX ADMIN — Medication 1000 MILLIGRAM(S): at 16:00

## 2023-11-11 RX ADMIN — Medication 25 MICROGRAM(S): at 06:06

## 2023-11-11 RX ADMIN — FAMOTIDINE 20 MILLIGRAM(S): 10 INJECTION INTRAVENOUS at 22:55

## 2023-11-11 RX ADMIN — SUMATRIPTAN SUCCINATE 6 MILLIGRAM(S): 4 INJECTION, SOLUTION SUBCUTANEOUS at 03:39

## 2023-11-11 RX ADMIN — Medication 400 MILLIGRAM(S): at 15:34

## 2023-11-11 RX ADMIN — Medication 1 CAPSULE(S): at 18:41

## 2023-11-11 RX ADMIN — SUMATRIPTAN SUCCINATE 6 MILLIGRAM(S): 4 INJECTION, SOLUTION SUBCUTANEOUS at 04:39

## 2023-11-11 RX ADMIN — Medication 1 CAPSULE(S): at 03:40

## 2023-11-11 RX ADMIN — Medication 1 CAPSULE(S): at 09:42

## 2023-11-11 RX ADMIN — Medication 1 CAPSULE(S): at 10:42

## 2023-11-11 RX ADMIN — Medication 25 MILLIGRAM(S): at 13:10

## 2023-11-11 NOTE — PROVIDER CONTACT NOTE (OTHER) - SITUATION
Pt complains of headache asked provider if can give PRN rizatriptan for headache. Also informed provider pt complains of new onset of indigestion and heartburn in last hr, pt denies chest pain. A
patient called RN regarding onset of episode of right-sided paralysis

## 2023-11-11 NOTE — CHART NOTE - NSCHARTNOTEFT_GEN_A_CORE
Called by RN, patient with migraine headache rated at 10/10. Patient received Fioricet and reports that she continues to have migraine headache 8/10. Ofirmev 1g ordered.
Called by RN, patient with right sided paresthesia and weakness. Patient reports right sided facial paresthesia from her ears radiating to her jaw, right UE and LE weakness. Patient reports that she has previously experienced similar symptoms, last episode was last night at 3AM but occurred on the left side. The episode last night lasted 2-3 hours and resolved spontaneously. Endorses continued migraine headache rated 10/10 even after receiving Fioricet. States that her headache improved earlier yesterday afternoon after receiving Imitrex. On examination, PERRLA, EOMI, CN II - XII intact, decreased sensation of the right face, arm and leg, 2/5 motor strength of RUE and RLE, no facial droop. Imitrex 6mg SubQ x1 ordered. Topamax as per Neuro ordered.
Patient had another episode of intractable headache, seen by Neuro , ordered SQ injection of Sumatriptan. Will monitor for now. W/u so far negative. D/w patient that Tick borne Ab Panel might take some time to be resulted and that she can f/u with ID as outpatient. D/w ID Dr. Sykes covering for Dr. Fofana, will also speak to patient. All questions answered and concerns addressed.
Resident was called because patient was having a headache, indigestion and mild SOB when walking to bathroom. Upon seeing the patient, she explained that she is having some mild burning mid sternal region, and an acid like taste in her mouth. She noted that when she got up to use the bathroom, she felt mild SOB due to this indigestion feeling. The SOB went away, but patient is still feeling some indigestion. She also noted that her headache had returned. Approved for patient to receive her PRN rizatriptan, but awaiting medication to be brought up from Pharmacy. Ordered Pepcid to help with indigestion. Patient denies any chest pain, palpitations, SOB, chills, vomiting. Physical exam chest was CTA and heart RRR.

## 2023-11-11 NOTE — PROGRESS NOTE ADULT - PROBLEM SELECTOR PLAN 1
Patient presents with HA, weakness, and b/l numbness in face, UE and LE   - Recently diagnosed with viral meningitis 10/16, was on acyclovir and has intermittent headaches since then   - Patient with multiple admissions and ED visits with similar complaints   - Stroke workup including CT, MRI and EEG all negative from recent admission in [facility] 10/23-/10/27   - MRI, MRV from 11/08/2023 all negative   -Serology is also negative   - Will continue PRN Zofran  -Sumatriptan PRN   -Fioricet PRN severe headaches   - Neuro, Dr. Bowen. Recommended Topamax and PRN Imitrex. Also  cleared patient for discharge but patient wants to make sure headaches resolves completely before she goes home because do not want to be readmitted again   -ID saw patient, patient wants to wait until tick born serology is performed, pending result. ID recommended outpatient f/u

## 2023-11-11 NOTE — PROGRESS NOTE ADULT - SUBJECTIVE AND OBJECTIVE BOX
Patient is a 38y old  Female who presents with a chief complaint of headaches, weakness (10 Nov 2023 18:42)      INTERVAL HPI/OVERNIGHT EVENTS: Patient seen and examined at bedside. Overnight events noted. Headaches is better now but not comfortable going home yet. Denies chest pain, palpitation, sob, nausea, vomiting or diarrhea     MEDICATIONS  (STANDING):  acetaminophen   IVPB .. 1000 milliGRAM(s) IV Intermittent once  influenza   Vaccine 0.5 milliLiter(s) IntraMuscular once  levothyroxine 25 MICROGram(s) Oral daily  SUMAtriptan Injectable 6 milliGRAM(s) SubCutaneous once  topiramate 25 milliGRAM(s) Oral daily    MEDICATIONS  (PRN):  acetaminophen     Tablet .. 650 milliGRAM(s) Oral every 6 hours PRN Temp greater or equal to 38C (100.4F), Mild Pain (1 - 3)  acetaminophen 300 mG/butalbital 50 mG/ caffeine 40 mG 1 Capsule(s) Oral every 6 hours PRN severe headache  aluminum hydroxide/magnesium hydroxide/simethicone Suspension 30 milliLiter(s) Oral every 4 hours PRN Dyspepsia  melatonin 3 milliGRAM(s) Oral at bedtime PRN Insomnia  ondansetron Injectable 4 milliGRAM(s) IV Push every 8 hours PRN Nausea and/or Vomiting  Rizatriptan 10 milliGRAM(s) 10 milliGRAM(s) Oral every 8 hours PRN headache      Allergies    penicillin (Short breath)    Intolerances        REVIEW OF SYSTEMS:  CONSTITUTIONAL: No fever, weight loss, or fatigue  EYES: No eye pain, visual disturbances, or discharge  ENMT:  No difficulty hearing, tinnitus, vertigo; No sinus or throat pain  NECK: No pain or stiffness  RESPIRATORY: No cough, wheezing, chills or hemoptysis; No shortness of breath  CARDIOVASCULAR: No chest pain, palpitations, dizziness, or leg swelling  GASTROINTESTINAL: No abdominal or epigastric pain. No nausea, vomiting, or hematemesis; No diarrhea or constipation. No melena or hematochezia.  GENITOURINARY: No dysuria, frequency, hematuria, or incontinence  NEUROLOGICAL: No  headache this morning, No memory loss, loss of strength, numbness, or tremors  SKIN: No itching, burning, rashes, or lesions   LYMPH NODES: No enlarged glands  ENDOCRINE: No heat or cold intolerance; No hair loss; No polydipsia or polyuria  MUSCULOSKELETAL: No joint pain or swelling; No muscle, back, or extremity pain  PSYCHIATRIC: No depression, anxiety, mood swings, or difficulty sleeping  HEME/LYMPH: No easy bruising, or bleeding gums  ALLERGY AND IMMUNOLOGIC: No hives or eczema    Vital Signs Last 24 Hrs  T(C): 36.4 (11 Nov 2023 05:39), Max: 36.9 (10 Nov 2023 20:11)  T(F): 97.6 (11 Nov 2023 05:39), Max: 98.5 (10 Nov 2023 20:11)  HR: 89 (11 Nov 2023 05:39) (71 - 89)  BP: 107/65 (11 Nov 2023 05:39) (95/64 - 107/65)  BP(mean): --  RR: 18 (11 Nov 2023 05:39) (18 - 18)  SpO2: 98% (11 Nov 2023 05:39) (95% - 98%)    Parameters below as of 11 Nov 2023 05:39  Patient On (Oxygen Delivery Method): room air        PHYSICAL EXAM:  GENERAL: NAD, well-groomed, well-developed  HEAD:  Atraumatic, Normocephalic  EYES: EOMI, PERRLA, conjunctiva and sclera clear  ENMT: No tonsillar erythema, exudates, or enlargement; Moist mucous membranes, Good dentition, No lesions  NECK: Supple, No JVD, Normal thyroid  NERVOUS SYSTEM:  Alert & Oriented X3, Good concentration; Motor Strength 5/5 B/L upper and lower extremities; DTRs 2+ intact and symmetric  CHEST/LUNG: Clear to auscultation bilaterally; No rales, rhonchi, wheezing, or rubs  HEART: Regular rate and rhythm; No murmurs, rubs, or gallops  ABDOMEN: Soft, Nontender, Nondistended; Bowel sounds present  EXTREMITIES:  2+ Peripheral Pulses, No clubbing, cyanosis, or edema  LYMPH: No lymphadenopathy noted  SKIN: No rashes or lesions    LABS:            CAPILLARY BLOOD GLUCOSE        BLOOD CULTURE    RADIOLOGY & ADDITIONAL TESTS:    Imaging Personally Reviewed:  [ ] YES     Consultant(s) Notes Reviewed:      Care Discussed with Consultants/Other Providers:

## 2023-11-11 NOTE — PROVIDER CONTACT NOTE (OTHER) - REASON
pt complains of headache and new onset in last hr of indigestion/heartburn
right-sided paralysis: face, arm and leg

## 2023-11-11 NOTE — PROVIDER CONTACT NOTE (OTHER) - ASSESSMENT
Neurological check completed. Patient able to answer all questions correctly, alert and oriented x4. Patient has strong strength left arm and left leg, poor strength of right arm and right leg. No facial droop observed. Patient continues to have headache

## 2023-11-12 LAB
A PHAGOCYTOPH IGG TITR SER IF: SIGNIFICANT CHANGE UP TITER
A PHAGOCYTOPH IGG TITR SER IF: SIGNIFICANT CHANGE UP TITER
B BURGDOR AB SER QL IA: NEGATIVE — SIGNIFICANT CHANGE UP
B BURGDOR AB SER QL IA: NEGATIVE — SIGNIFICANT CHANGE UP
B MICROTI IGG TITR SER: SIGNIFICANT CHANGE UP TITER
B MICROTI IGG TITR SER: SIGNIFICANT CHANGE UP TITER
E CHAFFEENSIS IGG TITR SER IF: SIGNIFICANT CHANGE UP TITER
E CHAFFEENSIS IGG TITR SER IF: SIGNIFICANT CHANGE UP TITER

## 2023-11-12 PROCEDURE — 99233 SBSQ HOSP IP/OBS HIGH 50: CPT

## 2023-11-12 RX ORDER — ACETAMINOPHEN 500 MG
1000 TABLET ORAL ONCE
Refills: 0 | Status: COMPLETED | OUTPATIENT
Start: 2023-11-12 | End: 2023-11-12

## 2023-11-12 RX ORDER — CALCIUM CARBONATE 500(1250)
1 TABLET ORAL EVERY 6 HOURS
Refills: 0 | Status: DISCONTINUED | OUTPATIENT
Start: 2023-11-12 | End: 2023-11-13

## 2023-11-12 RX ADMIN — Medication 400 MILLIGRAM(S): at 20:05

## 2023-11-12 RX ADMIN — Medication 25 MILLIGRAM(S): at 11:41

## 2023-11-12 RX ADMIN — Medication 1 CAPSULE(S): at 09:58

## 2023-11-12 RX ADMIN — Medication 1 CAPSULE(S): at 08:58

## 2023-11-12 RX ADMIN — Medication 1 CAPSULE(S): at 18:36

## 2023-11-12 RX ADMIN — Medication 1 CAPSULE(S): at 17:32

## 2023-11-12 RX ADMIN — Medication 25 MICROGRAM(S): at 06:13

## 2023-11-12 RX ADMIN — Medication 1 TABLET(S): at 18:42

## 2023-11-12 RX ADMIN — Medication 1000 MILLIGRAM(S): at 21:01

## 2023-11-12 RX ADMIN — Medication 1 CAPSULE(S): at 02:55

## 2023-11-12 NOTE — PROGRESS NOTE ADULT - SUBJECTIVE AND OBJECTIVE BOX
Patient is a 38y old  Female who presents with a chief complaint of headaches, weakness (11 Nov 2023 07:43)       INTERVAL HPI/OVERNIGHT EVENTS: Patient seen and examined at bedside. Events noted for headache, heart burn.     MEDICATIONS  (STANDING):  influenza   Vaccine 0.5 milliLiter(s) IntraMuscular once  levothyroxine 25 MICROGram(s) Oral daily  SUMAtriptan Injectable 6 milliGRAM(s) SubCutaneous once  topiramate 25 milliGRAM(s) Oral daily    MEDICATIONS  (PRN):  acetaminophen 300 mG/butalbital 50 mG/ caffeine 40 mG 1 Capsule(s) Oral every 6 hours PRN severe headache  aluminum hydroxide/magnesium hydroxide/simethicone Suspension 30 milliLiter(s) Oral every 4 hours PRN Dyspepsia  melatonin 3 milliGRAM(s) Oral at bedtime PRN Insomnia  ondansetron Injectable 4 milliGRAM(s) IV Push every 8 hours PRN Nausea and/or Vomiting  Rizatriptan 10 milliGRAM(s) 10 milliGRAM(s) Oral every 8 hours PRN headache      Allergies    penicillin (Short breath)    Intolerances        REVIEW OF SYSTEMS:  CONSTITUTIONAL: No fever, weight loss, or fatigue  EYES: No eye pain, visual disturbances, or discharge  ENMT:  No difficulty hearing, tinnitus, vertigo; No sinus or throat pain  NECK: No pain or stiffness  RESPIRATORY: No cough, wheezing, chills or hemoptysis; No shortness of breath  CARDIOVASCULAR: No chest pain, palpitations, dizziness, or leg swelling  GASTROINTESTINAL: No abdominal or epigastric pain. No nausea, vomiting, or hematemesis; No diarrhea or constipation. No melena or hematochezia.  GENITOURINARY: No dysuria, frequency, hematuria, or incontinence  NEUROLOGICAL: + headaches, No  memory loss, loss of strength, numbness, or tremors  SKIN: No itching, burning, rashes, or lesions   LYMPH NODES: No enlarged glands  ENDOCRINE: No heat or cold intolerance; No hair loss; No polydipsia or polyuria  MUSCULOSKELETAL: No joint pain or swelling; No muscle, back, or extremity pain  PSYCHIATRIC: No depression, anxiety, mood swings, or difficulty sleeping  HEME/LYMPH: No easy bruising, or bleeding gums  ALLERGY AND IMMUNOLOGIC: No hives or eczema    Vital Signs Last 24 Hrs  T(C): 36.8 (12 Nov 2023 05:17), Max: 36.8 (11 Nov 2023 18:52)  T(F): 98.2 (12 Nov 2023 05:17), Max: 98.3 (11 Nov 2023 18:52)  HR: 86 (12 Nov 2023 05:17) (65 - 86)  BP: 96/66 (12 Nov 2023 05:17) (96/66 - 132/74)  BP(mean): --  RR: 18 (12 Nov 2023 05:17) (18 - 18)  SpO2: 96% (12 Nov 2023 05:17) (96% - 98%)    Parameters below as of 12 Nov 2023 05:17  Patient On (Oxygen Delivery Method): room air        PHYSICAL EXAM:  GENERAL: NAD, well-groomed, well-developed  HEAD:  Atraumatic, Normocephalic  EYES: EOMI, PERRLA, conjunctiva and sclera clear  ENMT: No tonsillar erythema, exudates, or enlargement; Moist mucous membranes, Good dentition, No lesions  NECK: Supple, No JVD, Normal thyroid  NERVOUS SYSTEM:  Alert & Oriented X3, Good concentration; Motor Strength 5/5 B/L upper and lower extremities; DTRs 2+ intact and symmetric  CHEST/LUNG: Clear to auscultation bilaterally; No rales, rhonchi, wheezing, or rubs  HEART: Regular rate and rhythm; No murmurs, rubs, or gallops  ABDOMEN: Soft, Nontender, Nondistended; Bowel sounds present  EXTREMITIES:  2+ Peripheral Pulses, No clubbing, cyanosis, or edema  LYMPH: No lymphadenopathy noted  SKIN: No rashes or lesions    LABS:                        13.1   8.95  )-----------( 272      ( 11 Nov 2023 08:02 )             37.9     11 Nov 2023 08:02    142    |  109    |  8      ----------------------------<  111    4.3     |  27     |  0.77     Ca    9.0        11 Nov 2023 08:02        CAPILLARY BLOOD GLUCOSE        BLOOD CULTURE    RADIOLOGY & ADDITIONAL TESTS:    Imaging Personally Reviewed:  [ ] YES     Consultant(s) Notes Reviewed:      Care Discussed with Consultants/Other Providers: Patient is a 38y old  Female who presents with a chief complaint of headaches, weakness (11 Nov 2023 07:43)       INTERVAL HPI/OVERNIGHT EVENTS: Patient seen and examined at bedside. Events noted for headache, heart burn. Denies chest pain, palpitation, sob now. Headache is better also     MEDICATIONS  (STANDING):  influenza   Vaccine 0.5 milliLiter(s) IntraMuscular once  levothyroxine 25 MICROGram(s) Oral daily  SUMAtriptan Injectable 6 milliGRAM(s) SubCutaneous once  topiramate 25 milliGRAM(s) Oral daily    MEDICATIONS  (PRN):  acetaminophen 300 mG/butalbital 50 mG/ caffeine 40 mG 1 Capsule(s) Oral every 6 hours PRN severe headache  aluminum hydroxide/magnesium hydroxide/simethicone Suspension 30 milliLiter(s) Oral every 4 hours PRN Dyspepsia  melatonin 3 milliGRAM(s) Oral at bedtime PRN Insomnia  ondansetron Injectable 4 milliGRAM(s) IV Push every 8 hours PRN Nausea and/or Vomiting  Rizatriptan 10 milliGRAM(s) 10 milliGRAM(s) Oral every 8 hours PRN headache      Allergies    penicillin (Short breath)    Intolerances        REVIEW OF SYSTEMS:  CONSTITUTIONAL: No fever, weight loss, or fatigue  EYES: No eye pain, visual disturbances, or discharge  ENMT:  No difficulty hearing, tinnitus, vertigo; No sinus or throat pain  NECK: No pain or stiffness  RESPIRATORY: No cough, wheezing, chills or hemoptysis; No shortness of breath  CARDIOVASCULAR: No chest pain, palpitations, dizziness, or leg swelling  GASTROINTESTINAL: No abdominal or epigastric pain. No nausea, vomiting, or hematemesis; No diarrhea or constipation. No melena or hematochezia.  GENITOURINARY: No dysuria, frequency, hematuria, or incontinence  NEUROLOGICAL: + headaches, No  memory loss, loss of strength, numbness, or tremors  SKIN: No itching, burning, rashes, or lesions   LYMPH NODES: No enlarged glands  ENDOCRINE: No heat or cold intolerance; No hair loss; No polydipsia or polyuria  MUSCULOSKELETAL: No joint pain or swelling; No muscle, back, or extremity pain  PSYCHIATRIC: No depression, anxiety, mood swings, or difficulty sleeping  HEME/LYMPH: No easy bruising, or bleeding gums  ALLERGY AND IMMUNOLOGIC: No hives or eczema    Vital Signs Last 24 Hrs  T(C): 36.8 (12 Nov 2023 05:17), Max: 36.8 (11 Nov 2023 18:52)  T(F): 98.2 (12 Nov 2023 05:17), Max: 98.3 (11 Nov 2023 18:52)  HR: 86 (12 Nov 2023 05:17) (65 - 86)  BP: 96/66 (12 Nov 2023 05:17) (96/66 - 132/74)  BP(mean): --  RR: 18 (12 Nov 2023 05:17) (18 - 18)  SpO2: 96% (12 Nov 2023 05:17) (96% - 98%)    Parameters below as of 12 Nov 2023 05:17  Patient On (Oxygen Delivery Method): room air        PHYSICAL EXAM:  GENERAL: NAD, well-groomed, well-developed  HEAD:  Atraumatic, Normocephalic  EYES: EOMI, PERRLA, conjunctiva and sclera clear  ENMT: No tonsillar erythema, exudates, or enlargement; Moist mucous membranes, Good dentition, No lesions  NECK: Supple, No JVD, Normal thyroid  NERVOUS SYSTEM:  Alert & Oriented X3, Good concentration; Motor Strength 5/5 B/L upper and lower extremities; DTRs 2+ intact and symmetric  CHEST/LUNG: Clear to auscultation bilaterally; No rales, rhonchi, wheezing, or rubs  HEART: Regular rate and rhythm; No murmurs, rubs, or gallops  ABDOMEN: Soft, Nontender, Nondistended; Bowel sounds present  EXTREMITIES:  2+ Peripheral Pulses, No clubbing, cyanosis, or edema  LYMPH: No lymphadenopathy noted  SKIN: No rashes or lesions    LABS:                        13.1   8.95  )-----------( 272      ( 11 Nov 2023 08:02 )             37.9     11 Nov 2023 08:02    142    |  109    |  8      ----------------------------<  111    4.3     |  27     |  0.77     Ca    9.0        11 Nov 2023 08:02        CAPILLARY BLOOD GLUCOSE        BLOOD CULTURE    RADIOLOGY & ADDITIONAL TESTS:    Imaging Personally Reviewed:  [ ] YES     Consultant(s) Notes Reviewed:      Care Discussed with Consultants/Other Providers:

## 2023-11-12 NOTE — PROGRESS NOTE ADULT - PROBLEM SELECTOR PLAN 1
Patient presents with HA, weakness, and b/l numbness in face, UE and LE   - Recently diagnosed with viral meningitis 10/16, was on acyclovir and has intermittent headaches since then   - Patient with multiple admissions and ED visits with similar complaints   - Stroke workup including CT, MRI and EEG all negative from recent admission in [facility] 10/23-/10/27   - MRI, MRV from 11/08/2023 all negative   -Serology is also negative   - Will continue PRN Zofran  -Sumatriptan PRN   -Fioricet PRN severe headaches   - Neuro, Dr. Bowen. Recommended Topamax and PRN Imitrex. Also  cleared patient for discharge but patient wants to make sure headaches resolves completely before she goes home because do not want to be readmitted again   -ID saw patient, patient wants to wait until tick born serology is performed, pending result. ID recommended outpatient f/u Patient presents with HA, weakness, and b/l numbness in face, UE and LE   - Recently diagnosed with viral meningitis 10/16, was on acyclovir and has intermittent headaches since then   - Patient with multiple admissions and ED visits with similar complaints   - Stroke workup including CT, MRI and EEG all negative from recent admission in [facility] 10/23-/10/27   - MRI, MRV from 11/08/2023 all negative   -Serology is also negative   - Will continue PRN Zofran  -Tums PRN acid reflux symptoms   -Sumatriptan PRN   -Fioricet PRN severe headaches   - Neuro, Dr. Bowen. Recommended Topamax and PRN Imitrex. Also  cleared patient for discharge but patient wants to make sure headaches resolves completely before she goes home because do not want to be readmitted again   -ID saw patient, patient wants to wait until tick born serology is performed, pending result. ID recommended outpatient f/u

## 2023-11-13 VITALS
RESPIRATION RATE: 18 BRPM | OXYGEN SATURATION: 94 % | DIASTOLIC BLOOD PRESSURE: 63 MMHG | HEART RATE: 82 BPM | SYSTOLIC BLOOD PRESSURE: 97 MMHG | TEMPERATURE: 98 F | WEIGHT: 140.21 LBS

## 2023-11-13 PROBLEM — Z86.39 PERSONAL HISTORY OF OTHER ENDOCRINE, NUTRITIONAL AND METABOLIC DISEASE: Chronic | Status: ACTIVE | Noted: 2023-11-07

## 2023-11-13 PROCEDURE — 86753 PROTOZOA ANTIBODY NOS: CPT

## 2023-11-13 PROCEDURE — 86666 EHRLICHIA ANTIBODY: CPT

## 2023-11-13 PROCEDURE — 96375 TX/PRO/DX INJ NEW DRUG ADDON: CPT

## 2023-11-13 PROCEDURE — 85027 COMPLETE CBC AUTOMATED: CPT

## 2023-11-13 PROCEDURE — 83735 ASSAY OF MAGNESIUM: CPT

## 2023-11-13 PROCEDURE — 80048 BASIC METABOLIC PNL TOTAL CA: CPT

## 2023-11-13 PROCEDURE — 99285 EMERGENCY DEPT VISIT HI MDM: CPT

## 2023-11-13 PROCEDURE — 99239 HOSP IP/OBS DSCHRG MGMT >30: CPT

## 2023-11-13 PROCEDURE — 80053 COMPREHEN METABOLIC PANEL: CPT

## 2023-11-13 PROCEDURE — 96374 THER/PROPH/DIAG INJ IV PUSH: CPT

## 2023-11-13 PROCEDURE — 70546 MR ANGIOGRAPH HEAD W/O&W/DYE: CPT

## 2023-11-13 PROCEDURE — 70553 MRI BRAIN STEM W/O & W/DYE: CPT

## 2023-11-13 PROCEDURE — 86703 HIV-1/HIV-2 1 RESULT ANTBDY: CPT

## 2023-11-13 PROCEDURE — 72125 CT NECK SPINE W/O DYE: CPT | Mod: 26

## 2023-11-13 PROCEDURE — 86618 LYME DISEASE ANTIBODY: CPT

## 2023-11-13 PROCEDURE — 85025 COMPLETE CBC W/AUTO DIFF WBC: CPT

## 2023-11-13 PROCEDURE — 36415 COLL VENOUS BLD VENIPUNCTURE: CPT

## 2023-11-13 PROCEDURE — 84702 CHORIONIC GONADOTROPIN TEST: CPT

## 2023-11-13 PROCEDURE — A9579: CPT

## 2023-11-13 PROCEDURE — 70450 CT HEAD/BRAIN W/O DYE: CPT

## 2023-11-13 PROCEDURE — 72125 CT NECK SPINE W/O DYE: CPT

## 2023-11-13 PROCEDURE — 82962 GLUCOSE BLOOD TEST: CPT

## 2023-11-13 PROCEDURE — 84443 ASSAY THYROID STIM HORMONE: CPT

## 2023-11-13 RX ORDER — TOPIRAMATE 25 MG
1 TABLET ORAL
Qty: 7 | Refills: 0
Start: 2023-11-13 | End: 2023-11-19

## 2023-11-13 RX ORDER — SUMATRIPTAN SUCCINATE 4 MG/.5ML
1 INJECTION, SOLUTION SUBCUTANEOUS
Qty: 6 | Refills: 0
Start: 2023-11-13 | End: 2023-11-15

## 2023-11-13 RX ADMIN — Medication 1 CAPSULE(S): at 05:00

## 2023-11-13 RX ADMIN — Medication 25 MILLIGRAM(S): at 11:13

## 2023-11-13 RX ADMIN — Medication 25 MICROGRAM(S): at 05:00

## 2023-11-13 NOTE — PROGRESS NOTE ADULT - PROVIDER SPECIALTY LIST ADULT
Infectious Disease
Neurology
Neurology
Infectious Disease
Internal Medicine
Neurology
Neurology
Internal Medicine

## 2023-11-13 NOTE — PROGRESS NOTE ADULT - SUBJECTIVE AND OBJECTIVE BOX
Patient is a 38y old  Female who presents with a chief complaint of headaches, weakness (12 Nov 2023 07:16)       INTERVAL HPI/OVERNIGHT EVENTS: Patient seen and examined at bedside. Headaches is better but c/o neck pain and has history of neck injury in 2012. Wants to make sure she does not have herniated disc that got worse and giving her the symptoms. Denies chest pain, palpitation, sob, N/V/D, abdominal pain     MEDICATIONS  (STANDING):  influenza   Vaccine 0.5 milliLiter(s) IntraMuscular once  levothyroxine 25 MICROGram(s) Oral daily  SUMAtriptan Injectable 6 milliGRAM(s) SubCutaneous once  topiramate 25 milliGRAM(s) Oral daily    MEDICATIONS  (PRN):  acetaminophen 300 mG/butalbital 50 mG/ caffeine 40 mG 1 Capsule(s) Oral every 6 hours PRN severe headache  aluminum hydroxide/magnesium hydroxide/simethicone Suspension 30 milliLiter(s) Oral every 4 hours PRN Dyspepsia  calcium carbonate    500 mG (Tums) Chewable 1 Tablet(s) Chew every 6 hours PRN Heartburn  melatonin 3 milliGRAM(s) Oral at bedtime PRN Insomnia  ondansetron Injectable 4 milliGRAM(s) IV Push every 8 hours PRN Nausea and/or Vomiting  Rizatriptan 10 milliGRAM(s) 10 milliGRAM(s) Oral every 8 hours PRN headache      Allergies    penicillin (Short breath)    Intolerances        REVIEW OF SYSTEMS:  Per HPI. All other ROS noted Negative   Vital Signs Last 24 Hrs  T(C): 36.6 (13 Nov 2023 05:43), Max: 36.9 (12 Nov 2023 11:33)  T(F): 97.9 (13 Nov 2023 05:43), Max: 98.4 (12 Nov 2023 11:33)  HR: 82 (13 Nov 2023 05:43) (69 - 82)  BP: 97/63 (13 Nov 2023 05:43) (97/63 - 105/70)  BP(mean): --  RR: 18 (13 Nov 2023 05:43) (18 - 19)  SpO2: 94% (13 Nov 2023 05:43) (94% - 98%)    Parameters below as of 13 Nov 2023 05:43  Patient On (Oxygen Delivery Method): room air        PHYSICAL EXAM:  GENERAL: NAD, well-groomed, well-developed  HEAD:  Atraumatic, Normocephalic  EYES: EOMI, PERRLA, conjunctiva and sclera clear  ENMT: No tonsillar erythema, exudates, or enlargement; Moist mucous membranes, Good dentition, No lesions  NECK: Supple, No JVD, Normal thyroid  NERVOUS SYSTEM:  Alert & Oriented X3, Good concentration; Motor Strength 5/5 B/L upper and lower extremities; DTRs 2+ intact and symmetric  CHEST/LUNG: Clear to auscultation bilaterally; No rales, rhonchi, wheezing, or rubs  HEART: Regular rate and rhythm; No murmurs, rubs, or gallops  ABDOMEN: Soft, Nontender, Nondistended; Bowel sounds present  EXTREMITIES:  2+ Peripheral Pulses, No clubbing, cyanosis, or edema  LYMPH: No lymphadenopathy noted  SKIN: No rashes or lesions    LABS:      Ca    9.0        11 Nov 2023 08:02        CAPILLARY BLOOD GLUCOSE        BLOOD CULTURE    RADIOLOGY & ADDITIONAL TESTS:    Imaging Personally Reviewed:  [ ] YES     Consultant(s) Notes Reviewed:      Care Discussed with Consultants/Other Providers:

## 2023-11-13 NOTE — PROGRESS NOTE ADULT - REASON FOR ADMISSION
headaches, weakness

## 2023-11-13 NOTE — CASE MANAGEMENT PROGRESS NOTE - NSCMPROGRESSNOTE_GEN_ALL_CORE
Discussed pt on rounds, pt remains acute with complaints of headaches. CM will continue to collaborate with interdisciplinary team and remain available to assist.

## 2023-11-13 NOTE — PROGRESS NOTE ADULT - PROBLEM SELECTOR PLAN 2
Continue Levothyroxine

## 2023-11-13 NOTE — PROGRESS NOTE ADULT - ASSESSMENT
37yo female with PMH of Hashimoto's disease, hypothyroidism, hemochromatosis, who was admitted with severe headache and bilateral numbness. On 10/16 patient had right sided numbness and weakness and went to [facility]. After workup, she was diagnosed with viral meningitis and was empirically treated with acyclovir. She was discharged from [facility] on 10/19.   No recent travel, was in [facility] when had headache and LP. As per discharge summary there was a lymphocytosis in LP with normal Prot and Glucose and negative culture and PCR (HSV negative as well) but she was treated with acyclovir and later switched to valtrex.     Remains afebrile. HIV negative. Lyme negative. Rheumatology work up negative. CRP<3 and ESR 8. MRI brain with IV contrast unremarkable.    # Headache  # Recent Viral meningitis ?     -continue supportive management  -follow up tick serologies--can follow up with ID as outpatient  -discussed with Dr. Sibley  -will sign off, please call ID if any questions or concerns. Thank you.    Salome Sykes MD  Division of infectious Diseases  Cell [phone] between 8am and 6pm  After 6pm and over the weekends please call ID service line at [phone].     35 minutes spent on total encounter assessing patient, examination, chart review, counseling and coordinating care by the attending physician/nurse/care manager.   
38 year old female with pmhx of Hashimoto's disease, hypothyroidism, hemochromatosis, came in complaining of severe headache and bilateral numbness sensation. On 10/16 patient had right sided numbness and weakness and went to [facility].  Admit for headaches and weakness. 
38 year old female with pmhx of Hashimoto's disease, hypothyroidism, hemochromatosis, came in complaining of severe headache and bilateral numbness sensation. On 10/16 patient had right sided numbness and weakness and went to [facility].  Admit for headaches and weakness. 
38 year old female with pmhx of Hashimoto's disease, hypothyroidism, hemochromatosis, came in complaining of severe headache and bilateral numbness sensation. On 10/16 patient had right sided numbness and weakness and went to [facility].  Admit for headaches and weakness. 
38 year old female with pmhx of Hashimoto's disease, hypothyroidism, hemochromatosis, came in complaining of severe headache and bilateral numbness sensation. On 10/16 patient had right sided numbness and weakness and went to [facility].  Admit for headaches and weakness. 
38 year old female with pmhx of Hashimoto's disease, hypothyroidism, hemochromatosis, came in complaining of severe headache and bilateral numbness sensation. On 10/16 patient had right sided numbness and weakness and went to [facility].  Admit for headaches and weakness. 
38 year old female with pmhx of Hashimoto's disease, hypothyroidism, hemochromatosis, came in complaining of severe headache and bilateral numbness sensation. On 10/16 patient had right sided numbness and weakness and went to [facility].  Admit for headaches and weakness.

## 2023-11-13 NOTE — PROGRESS NOTE ADULT - TIME BILLING
Note written by attending. Meds, labs, vitals, chart reviewed. Plan d/w Patient, all questions answered.
Note written by attending. Meds, labs, vitals, chart reviewed. Plan d/w Patient, all questions answered
Note written by attending. Meds, labs, vitals, chart reviewed. Plan d/w Patient, all questions answered. D/w patient's  at bedside, with patient's permission as well as her mother yesterday

## 2023-11-13 NOTE — PROGRESS NOTE ADULT - PROBLEM SELECTOR PLAN 1
Patient presents with HA, weakness, and b/l numbness in face, UE and LE   - Recently diagnosed with viral meningitis 10/16, was on acyclovir and has intermittent headaches since then   - Patient with multiple admissions and ED visits with similar complaints   - Stroke workup including CT, MRI and EEG all negative from recent admission in [facility] 10/23-/10/27   - MRI, MRV from 11/08/2023 all negative   -Serology is also negative   - Will continue PRN Zofran  -Tums PRN acid reflux symptoms   -Sumatriptan PRN   -Fioricet PRN severe headaches   - Neuro, Dr. Bowen. Recommended Topamax and PRN Imitrex. Also  cleared patient for discharge but patient wants to make sure headaches resolves completely before she goes home because do not want to be readmitted again   -ID saw patient, patient wants to wait until tick born serology is performed and is noted negative   -Patient concerned about h/o neck injury from 2012 and possible herniated disc and wants to talk to neurologist. CT cervical spine ordered and Dr. Davis informed to talk to her as well

## 2023-11-13 NOTE — PROGRESS NOTE ADULT - SUBJECTIVE AND OBJECTIVE BOX
Neurology Follow up note    MORE [id number]    HPI:  38 year old female with pmhx of Hashimoto's disease, hypothyroidism, hemochromatosis, came in complaining of severe headache and bilateral numbness sensation. On 10/16 patient had right sided numbness and weakness and went to [facility]. After extensive workup, she was diagnosed with viral meningitis and was treated with acyclovir. She was discharged from [facility] on 10/19. On 10/22, patient complained of right sided weakness and numbness and went back to [facility]. After discharge she returned to the hospital on 10/23 complaining of bilateral weakness radiating to her legs. She was admitted and had complete stroke workup including CT, MRI, EEG which were all negative. She was discharged 10/27. On 10/31 she returned again to [facility] with similar complaints and was discharged from the ED. On 11/2 she presented to [facility] ED complaining of headache. She was discharged on Maxalta and has an appt tomorrow with neurology outpatient. Patient now present with her fiancee stating that her symptoms have not resolved since 11/2 and that she has HA along b/l weakness in her face, arms and legs. Patient states she was nauseous before but that resolved after receiving Zofran but she feels weak now and has a HA. She notes that Maxalta has been helping with her headaches. Denies fever, cp, sob, abd pain, N/V/D/C.     ED course:   Vitals: T 98 HR 94 /66 RR 18 SpO2 98% RA  Labs: glucose 129   Given: Zofran    (07 Nov 2023 17:56)      Interval History -no ha now    Patient is seen, chart was reviewed and case was discussed with the treatment team.  Pt is not in any distress.   Lying on bed comfortably.      .    Vital Signs Last 24 Hrs  T(C): 36.6 (13 Nov 2023 05:43), Max: 36.9 (12 Nov 2023 11:33)  T(F): 97.9 (13 Nov 2023 05:43), Max: 98.4 (12 Nov 2023 11:33)  HR: 82 (13 Nov 2023 05:43) (69 - 82)  BP: 97/63 (13 Nov 2023 05:43) (97/63 - 105/70)  BP(mean): --  RR: 18 (13 Nov 2023 05:43) (18 - 19)  SpO2: 94% (13 Nov 2023 05:43) (94% - 98%)    Parameters below as of 13 Nov 2023 05:43  Patient On (Oxygen Delivery Method): room air                    REVIEW OF SYSTEMS:    Constitutional: No fever, weight loss or fatigue  Eyes: No eye pain, visual disturbances, or discharge  ENT:  No difficulty hearing, tinnitus, vertigo; No sinus or throat pain  Neck: No pain or stiffness  Respiratory: No cough, wheezing, chills or hemoptysis  Cardiovascular: No chest pain, palpitations, shortness of breath, dizziness or leg swelling  Gastrointestinal: No abdominal or epigastric pain. No nausea, vomiting or hematemesis; No diarrhea or constipation. No melena or hematochezia.  Genitourinary: No dysuria, frequency, hematuria or incontinence  Neurological: No , memory loss, loss of strength, numbness or tremors  Psychiatric: No depression, anxiety, mood swings or difficulty sleeping  Musculoskeletal: No joint pain or swelling; No muscle, back or extremity pain  Skin: No itching, burning, rashes or lesions   Lymph Nodes: No enlarged glands  Endocrine: No heat or cold intolerance; No hair loss,   Allergy and Immunologic: No hives or eczema    On Neurological Examination:    Mental Status - Pt is alert, awake, oriented X3. Higher functions are intact. Follows commands well and able to answer questions appropriately.Mood and affect  normal    Speech -  Normal. .    Cranial Nerves - Pupils 3 mm equal and reactive to light, extraocular eye movements intact. Pt has no visual field deficit.  Pt has no facial asymmetry. Facial sensation is intact.Tongue - is in midline.    Muscle tone - is normal all over. Moves all extremities equally. No asymmetry is seen.      Motor Exam - 5/5 all over, No drift. No shaking or tremors.    Sensory Exam - Pin prick, temperature, joint position and vibration are intact on either side. Pt withdraws all extremities equally on stimulation. No asymmetry seen. No complaints of tingling, numbness.    Gait - Able to stand and walk unassisted.        coordination:    Finger to nose: normal    Heel to shin: normal    Deep tendon Reflexes - 2 plus all over.        Romberg - Negative.    Neck Supple -  Yes.    MEDICATIONS  (STANDING):  influenza   Vaccine 0.5 milliLiter(s) IntraMuscular once  levothyroxine 25 MICROGram(s) Oral daily  SUMAtriptan Injectable 6 milliGRAM(s) SubCutaneous once  topiramate 25 milliGRAM(s) Oral daily    MEDICATIONS  (PRN):  acetaminophen 300 mG/butalbital 50 mG/ caffeine 40 mG 1 Capsule(s) Oral every 6 hours PRN severe headache  aluminum hydroxide/magnesium hydroxide/simethicone Suspension 30 milliLiter(s) Oral every 4 hours PRN Dyspepsia  calcium carbonate    500 mG (Tums) Chewable 1 Tablet(s) Chew every 6 hours PRN Heartburn  melatonin 3 milliGRAM(s) Oral at bedtime PRN Insomnia  ondansetron Injectable 4 milliGRAM(s) IV Push every 8 hours PRN Nausea and/or Vomiting  Rizatriptan 10 milliGRAM(s) 10 milliGRAM(s) Oral every 8 hours PRN headache          Allergies    penicillin (Short breath)    Intolerances        Hemoglobin A1C:     Vitamin B12     RADIOLOGY    ASSESSMENT AND PLAN:      seen for ha/facial paresthesia- likely migraine   repeat brain mri wwo- negative  MRV -no sinuses thrombosis    imitrex 100 mg q 12 orn for ha  topamax 25 mg qd  tick panel is negative  discharge planning  management de patient and dr Sibley  Pain is accessed and addressed.  Would continue to follow.

## 2023-11-15 ENCOUNTER — APPOINTMENT (OUTPATIENT)
Dept: INTERNAL MEDICINE | Facility: CLINIC | Age: 38
End: 2023-11-15
Payer: COMMERCIAL

## 2023-11-15 VITALS
WEIGHT: 145 LBS | HEART RATE: 80 BPM | BODY MASS INDEX: 26.68 KG/M2 | DIASTOLIC BLOOD PRESSURE: 68 MMHG | SYSTOLIC BLOOD PRESSURE: 110 MMHG | OXYGEN SATURATION: 99 % | RESPIRATION RATE: 14 BRPM | TEMPERATURE: 97.6 F | HEIGHT: 62 IN

## 2023-11-15 PROCEDURE — 99214 OFFICE O/P EST MOD 30 MIN: CPT

## 2023-11-16 ENCOUNTER — EMERGENCY (EMERGENCY)
Facility: HOSPITAL | Age: 38
LOS: 1 days | Discharge: HOME | End: 2023-11-16
Attending: STUDENT IN AN ORGANIZED HEALTH CARE EDUCATION/TRAINING PROGRAM | Admitting: STUDENT IN AN ORGANIZED HEALTH CARE EDUCATION/TRAINING PROGRAM
Payer: COMMERCIAL

## 2023-11-16 ENCOUNTER — EMERGENCY (EMERGENCY)
Facility: HOSPITAL | Age: 38
LOS: 1 days | Discharge: HOME | End: 2023-11-16
Attending: INTERNAL MEDICINE | Admitting: EMERGENCY MEDICINE
Payer: COMMERCIAL

## 2023-11-16 ENCOUNTER — APPOINTMENT (OUTPATIENT)
Dept: NEUROLOGY | Facility: CLINIC | Age: 38
End: 2023-11-16

## 2023-11-16 VITALS
TEMPERATURE: 97 F | HEIGHT: 63 IN | HEART RATE: 92 BPM | DIASTOLIC BLOOD PRESSURE: 82 MMHG | SYSTOLIC BLOOD PRESSURE: 109 MMHG | RESPIRATION RATE: 18 BRPM | OXYGEN SATURATION: 95 % | WEIGHT: 143.3 LBS

## 2023-11-16 VITALS
HEART RATE: 79 BPM | TEMPERATURE: 98 F | SYSTOLIC BLOOD PRESSURE: 104 MMHG | RESPIRATION RATE: 16 BRPM | DIASTOLIC BLOOD PRESSURE: 67 MMHG | OXYGEN SATURATION: 99 %

## 2023-11-16 VITALS
HEART RATE: 77 BPM | RESPIRATION RATE: 16 BRPM | SYSTOLIC BLOOD PRESSURE: 105 MMHG | OXYGEN SATURATION: 99 % | DIASTOLIC BLOOD PRESSURE: 71 MMHG | TEMPERATURE: 98 F

## 2023-11-16 VITALS
TEMPERATURE: 98 F | SYSTOLIC BLOOD PRESSURE: 110 MMHG | HEART RATE: 90 BPM | WEIGHT: 139.99 LBS | HEIGHT: 63 IN | RESPIRATION RATE: 16 BRPM | OXYGEN SATURATION: 98 % | DIASTOLIC BLOOD PRESSURE: 76 MMHG

## 2023-11-16 PROBLEM — E03.9 HYPOTHYROIDISM, UNSPECIFIED: Chronic | Status: ACTIVE | Noted: 2023-11-07

## 2023-11-16 PROBLEM — A87.9 VIRAL MENINGITIS, UNSPECIFIED: Chronic | Status: ACTIVE | Noted: 2023-11-07

## 2023-11-16 LAB
ALBUMIN SERPL ELPH-MCNC: 4 G/DL — SIGNIFICANT CHANGE UP (ref 3.3–5)
ALBUMIN SERPL ELPH-MCNC: 4 G/DL — SIGNIFICANT CHANGE UP (ref 3.3–5)
ALBUMIN SERPL ELPH-MCNC: 4.1 G/DL — SIGNIFICANT CHANGE UP (ref 3.3–5)
ALBUMIN SERPL ELPH-MCNC: 4.1 G/DL — SIGNIFICANT CHANGE UP (ref 3.3–5)
ALP SERPL-CCNC: 58 U/L — SIGNIFICANT CHANGE UP (ref 40–120)
ALP SERPL-CCNC: 58 U/L — SIGNIFICANT CHANGE UP (ref 40–120)
ALP SERPL-CCNC: 62 U/L — SIGNIFICANT CHANGE UP (ref 40–120)
ALP SERPL-CCNC: 62 U/L — SIGNIFICANT CHANGE UP (ref 40–120)
ALT FLD-CCNC: 31 U/L — SIGNIFICANT CHANGE UP (ref 12–78)
ALT FLD-CCNC: 31 U/L — SIGNIFICANT CHANGE UP (ref 12–78)
ALT FLD-CCNC: 33 U/L — SIGNIFICANT CHANGE UP (ref 12–78)
ALT FLD-CCNC: 33 U/L — SIGNIFICANT CHANGE UP (ref 12–78)
ANION GAP SERPL CALC-SCNC: 7 MMOL/L — SIGNIFICANT CHANGE UP (ref 5–17)
ANION GAP SERPL CALC-SCNC: 7 MMOL/L — SIGNIFICANT CHANGE UP (ref 5–17)
ANION GAP SERPL CALC-SCNC: 8 MMOL/L — SIGNIFICANT CHANGE UP (ref 5–17)
ANION GAP SERPL CALC-SCNC: 8 MMOL/L — SIGNIFICANT CHANGE UP (ref 5–17)
APPEARANCE UR: CLEAR — SIGNIFICANT CHANGE UP
APPEARANCE UR: CLEAR — SIGNIFICANT CHANGE UP
APTT BLD: 29.2 SEC — SIGNIFICANT CHANGE UP (ref 24.5–35.6)
APTT BLD: 29.2 SEC — SIGNIFICANT CHANGE UP (ref 24.5–35.6)
APTT BLD: 30.8 SEC — SIGNIFICANT CHANGE UP (ref 24.5–35.6)
APTT BLD: 30.8 SEC — SIGNIFICANT CHANGE UP (ref 24.5–35.6)
AST SERPL-CCNC: 16 U/L — SIGNIFICANT CHANGE UP (ref 15–37)
BACTERIA # UR AUTO: ABNORMAL /HPF
BACTERIA # UR AUTO: ABNORMAL /HPF
BASOPHILS # BLD AUTO: 0.05 K/UL — SIGNIFICANT CHANGE UP (ref 0–0.2)
BASOPHILS NFR BLD AUTO: 0.6 % — SIGNIFICANT CHANGE UP (ref 0–2)
BILIRUB SERPL-MCNC: 0.4 MG/DL — SIGNIFICANT CHANGE UP (ref 0.2–1.2)
BILIRUB SERPL-MCNC: 0.4 MG/DL — SIGNIFICANT CHANGE UP (ref 0.2–1.2)
BILIRUB SERPL-MCNC: 0.5 MG/DL — SIGNIFICANT CHANGE UP (ref 0.2–1.2)
BILIRUB SERPL-MCNC: 0.5 MG/DL — SIGNIFICANT CHANGE UP (ref 0.2–1.2)
BILIRUB UR-MCNC: NEGATIVE — SIGNIFICANT CHANGE UP
BILIRUB UR-MCNC: NEGATIVE — SIGNIFICANT CHANGE UP
BUN SERPL-MCNC: 6 MG/DL — LOW (ref 7–23)
BUN SERPL-MCNC: 6 MG/DL — LOW (ref 7–23)
BUN SERPL-MCNC: 9 MG/DL — SIGNIFICANT CHANGE UP (ref 7–23)
BUN SERPL-MCNC: 9 MG/DL — SIGNIFICANT CHANGE UP (ref 7–23)
CALCIUM SERPL-MCNC: 9.3 MG/DL — SIGNIFICANT CHANGE UP (ref 8.5–10.1)
CALCIUM SERPL-MCNC: 9.3 MG/DL — SIGNIFICANT CHANGE UP (ref 8.5–10.1)
CALCIUM SERPL-MCNC: 9.8 MG/DL — SIGNIFICANT CHANGE UP (ref 8.5–10.1)
CALCIUM SERPL-MCNC: 9.8 MG/DL — SIGNIFICANT CHANGE UP (ref 8.5–10.1)
CHLORIDE SERPL-SCNC: 107 MMOL/L — SIGNIFICANT CHANGE UP (ref 96–108)
CHLORIDE SERPL-SCNC: 107 MMOL/L — SIGNIFICANT CHANGE UP (ref 96–108)
CHLORIDE SERPL-SCNC: 113 MMOL/L — HIGH (ref 96–108)
CHLORIDE SERPL-SCNC: 113 MMOL/L — HIGH (ref 96–108)
CO2 SERPL-SCNC: 22 MMOL/L — SIGNIFICANT CHANGE UP (ref 22–31)
CO2 SERPL-SCNC: 22 MMOL/L — SIGNIFICANT CHANGE UP (ref 22–31)
CO2 SERPL-SCNC: 25 MMOL/L — SIGNIFICANT CHANGE UP (ref 22–31)
CO2 SERPL-SCNC: 25 MMOL/L — SIGNIFICANT CHANGE UP (ref 22–31)
COLOR SPEC: YELLOW — SIGNIFICANT CHANGE UP
COLOR SPEC: YELLOW — SIGNIFICANT CHANGE UP
CREAT SERPL-MCNC: 0.78 MG/DL — SIGNIFICANT CHANGE UP (ref 0.5–1.3)
CREAT SERPL-MCNC: 0.78 MG/DL — SIGNIFICANT CHANGE UP (ref 0.5–1.3)
CREAT SERPL-MCNC: 0.87 MG/DL — SIGNIFICANT CHANGE UP (ref 0.5–1.3)
CREAT SERPL-MCNC: 0.87 MG/DL — SIGNIFICANT CHANGE UP (ref 0.5–1.3)
D DIMER BLD IA.RAPID-MCNC: 247 NG/ML DDU — HIGH
D DIMER BLD IA.RAPID-MCNC: 247 NG/ML DDU — HIGH
DIFF PNL FLD: NEGATIVE — SIGNIFICANT CHANGE UP
DIFF PNL FLD: NEGATIVE — SIGNIFICANT CHANGE UP
EGFR: 100 ML/MIN/1.73M2 — SIGNIFICANT CHANGE UP
EGFR: 100 ML/MIN/1.73M2 — SIGNIFICANT CHANGE UP
EGFR: 87 ML/MIN/1.73M2 — SIGNIFICANT CHANGE UP
EGFR: 87 ML/MIN/1.73M2 — SIGNIFICANT CHANGE UP
EOSINOPHIL # BLD AUTO: 0.06 K/UL — SIGNIFICANT CHANGE UP (ref 0–0.5)
EOSINOPHIL # BLD AUTO: 0.06 K/UL — SIGNIFICANT CHANGE UP (ref 0–0.5)
EOSINOPHIL # BLD AUTO: 0.12 K/UL — SIGNIFICANT CHANGE UP (ref 0–0.5)
EOSINOPHIL # BLD AUTO: 0.12 K/UL — SIGNIFICANT CHANGE UP (ref 0–0.5)
EOSINOPHIL NFR BLD AUTO: 0.7 % — SIGNIFICANT CHANGE UP (ref 0–6)
EOSINOPHIL NFR BLD AUTO: 0.7 % — SIGNIFICANT CHANGE UP (ref 0–6)
EOSINOPHIL NFR BLD AUTO: 1.4 % — SIGNIFICANT CHANGE UP (ref 0–6)
EOSINOPHIL NFR BLD AUTO: 1.4 % — SIGNIFICANT CHANGE UP (ref 0–6)
EPI CELLS # UR: PRESENT
EPI CELLS # UR: PRESENT
GLUCOSE SERPL-MCNC: 105 MG/DL — HIGH (ref 70–99)
GLUCOSE SERPL-MCNC: 105 MG/DL — HIGH (ref 70–99)
GLUCOSE SERPL-MCNC: 147 MG/DL — HIGH (ref 70–99)
GLUCOSE SERPL-MCNC: 147 MG/DL — HIGH (ref 70–99)
GLUCOSE UR QL: NEGATIVE MG/DL — SIGNIFICANT CHANGE UP
GLUCOSE UR QL: NEGATIVE MG/DL — SIGNIFICANT CHANGE UP
HCG SERPL-ACNC: <1 MIU/ML — SIGNIFICANT CHANGE UP
HCG SERPL-ACNC: <1 MIU/ML — SIGNIFICANT CHANGE UP
HCT VFR BLD CALC: 35.4 % — SIGNIFICANT CHANGE UP (ref 34.5–45)
HCT VFR BLD CALC: 35.4 % — SIGNIFICANT CHANGE UP (ref 34.5–45)
HCT VFR BLD CALC: 37.9 % — SIGNIFICANT CHANGE UP (ref 34.5–45)
HCT VFR BLD CALC: 37.9 % — SIGNIFICANT CHANGE UP (ref 34.5–45)
HGB BLD-MCNC: 12.5 G/DL — SIGNIFICANT CHANGE UP (ref 11.5–15.5)
HGB BLD-MCNC: 12.5 G/DL — SIGNIFICANT CHANGE UP (ref 11.5–15.5)
HGB BLD-MCNC: 13.3 G/DL — SIGNIFICANT CHANGE UP (ref 11.5–15.5)
HGB BLD-MCNC: 13.3 G/DL — SIGNIFICANT CHANGE UP (ref 11.5–15.5)
IMM GRANULOCYTES NFR BLD AUTO: 0.2 % — SIGNIFICANT CHANGE UP (ref 0–0.9)
INR BLD: 0.94 RATIO — SIGNIFICANT CHANGE UP (ref 0.85–1.18)
INR BLD: 0.94 RATIO — SIGNIFICANT CHANGE UP (ref 0.85–1.18)
INR BLD: 0.96 RATIO — SIGNIFICANT CHANGE UP (ref 0.85–1.18)
INR BLD: 0.96 RATIO — SIGNIFICANT CHANGE UP (ref 0.85–1.18)
KETONES UR-MCNC: NEGATIVE MG/DL — SIGNIFICANT CHANGE UP
KETONES UR-MCNC: NEGATIVE MG/DL — SIGNIFICANT CHANGE UP
LACTATE SERPL-SCNC: 1.6 MMOL/L — SIGNIFICANT CHANGE UP (ref 0.7–2)
LACTATE SERPL-SCNC: 1.6 MMOL/L — SIGNIFICANT CHANGE UP (ref 0.7–2)
LEUKOCYTE ESTERASE UR-ACNC: ABNORMAL
LEUKOCYTE ESTERASE UR-ACNC: ABNORMAL
LIDOCAIN IGE QN: 35 U/L — SIGNIFICANT CHANGE UP (ref 13–75)
LIDOCAIN IGE QN: 35 U/L — SIGNIFICANT CHANGE UP (ref 13–75)
LYMPHOCYTES # BLD AUTO: 2.41 K/UL — SIGNIFICANT CHANGE UP (ref 1–3.3)
LYMPHOCYTES # BLD AUTO: 2.41 K/UL — SIGNIFICANT CHANGE UP (ref 1–3.3)
LYMPHOCYTES # BLD AUTO: 2.63 K/UL — SIGNIFICANT CHANGE UP (ref 1–3.3)
LYMPHOCYTES # BLD AUTO: 2.63 K/UL — SIGNIFICANT CHANGE UP (ref 1–3.3)
LYMPHOCYTES # BLD AUTO: 29.9 % — SIGNIFICANT CHANGE UP (ref 13–44)
LYMPHOCYTES # BLD AUTO: 29.9 % — SIGNIFICANT CHANGE UP (ref 13–44)
LYMPHOCYTES # BLD AUTO: 30.4 % — SIGNIFICANT CHANGE UP (ref 13–44)
LYMPHOCYTES # BLD AUTO: 30.4 % — SIGNIFICANT CHANGE UP (ref 13–44)
MAGNESIUM SERPL-MCNC: 2.1 MG/DL — SIGNIFICANT CHANGE UP (ref 1.6–2.6)
MAGNESIUM SERPL-MCNC: 2.1 MG/DL — SIGNIFICANT CHANGE UP (ref 1.6–2.6)
MCHC RBC-ENTMCNC: 31.1 PG — SIGNIFICANT CHANGE UP (ref 27–34)
MCHC RBC-ENTMCNC: 31.1 PG — SIGNIFICANT CHANGE UP (ref 27–34)
MCHC RBC-ENTMCNC: 31.2 PG — SIGNIFICANT CHANGE UP (ref 27–34)
MCHC RBC-ENTMCNC: 31.2 PG — SIGNIFICANT CHANGE UP (ref 27–34)
MCHC RBC-ENTMCNC: 35.1 GM/DL — SIGNIFICANT CHANGE UP (ref 32–36)
MCHC RBC-ENTMCNC: 35.1 GM/DL — SIGNIFICANT CHANGE UP (ref 32–36)
MCHC RBC-ENTMCNC: 35.3 GM/DL — SIGNIFICANT CHANGE UP (ref 32–36)
MCHC RBC-ENTMCNC: 35.3 GM/DL — SIGNIFICANT CHANGE UP (ref 32–36)
MCV RBC AUTO: 88.3 FL — SIGNIFICANT CHANGE UP (ref 80–100)
MCV RBC AUTO: 88.3 FL — SIGNIFICANT CHANGE UP (ref 80–100)
MCV RBC AUTO: 88.6 FL — SIGNIFICANT CHANGE UP (ref 80–100)
MCV RBC AUTO: 88.6 FL — SIGNIFICANT CHANGE UP (ref 80–100)
MONOCYTES # BLD AUTO: 0.5 K/UL — SIGNIFICANT CHANGE UP (ref 0–0.9)
MONOCYTES # BLD AUTO: 0.5 K/UL — SIGNIFICANT CHANGE UP (ref 0–0.9)
MONOCYTES # BLD AUTO: 0.61 K/UL — SIGNIFICANT CHANGE UP (ref 0–0.9)
MONOCYTES # BLD AUTO: 0.61 K/UL — SIGNIFICANT CHANGE UP (ref 0–0.9)
MONOCYTES NFR BLD AUTO: 5.8 % — SIGNIFICANT CHANGE UP (ref 2–14)
MONOCYTES NFR BLD AUTO: 5.8 % — SIGNIFICANT CHANGE UP (ref 2–14)
MONOCYTES NFR BLD AUTO: 7.6 % — SIGNIFICANT CHANGE UP (ref 2–14)
MONOCYTES NFR BLD AUTO: 7.6 % — SIGNIFICANT CHANGE UP (ref 2–14)
NEUTROPHILS # BLD AUTO: 4.92 K/UL — SIGNIFICANT CHANGE UP (ref 1.8–7.4)
NEUTROPHILS # BLD AUTO: 4.92 K/UL — SIGNIFICANT CHANGE UP (ref 1.8–7.4)
NEUTROPHILS # BLD AUTO: 5.33 K/UL — SIGNIFICANT CHANGE UP (ref 1.8–7.4)
NEUTROPHILS # BLD AUTO: 5.33 K/UL — SIGNIFICANT CHANGE UP (ref 1.8–7.4)
NEUTROPHILS NFR BLD AUTO: 61 % — SIGNIFICANT CHANGE UP (ref 43–77)
NEUTROPHILS NFR BLD AUTO: 61 % — SIGNIFICANT CHANGE UP (ref 43–77)
NEUTROPHILS NFR BLD AUTO: 61.6 % — SIGNIFICANT CHANGE UP (ref 43–77)
NEUTROPHILS NFR BLD AUTO: 61.6 % — SIGNIFICANT CHANGE UP (ref 43–77)
NITRITE UR-MCNC: NEGATIVE — SIGNIFICANT CHANGE UP
NITRITE UR-MCNC: NEGATIVE — SIGNIFICANT CHANGE UP
NRBC # BLD: 0 /100 WBCS — SIGNIFICANT CHANGE UP (ref 0–0)
NT-PROBNP SERPL-SCNC: 114 PG/ML — SIGNIFICANT CHANGE UP (ref 0–125)
NT-PROBNP SERPL-SCNC: 114 PG/ML — SIGNIFICANT CHANGE UP (ref 0–125)
PH UR: 7 — SIGNIFICANT CHANGE UP (ref 5–8)
PH UR: 7 — SIGNIFICANT CHANGE UP (ref 5–8)
PLATELET # BLD AUTO: 290 K/UL — SIGNIFICANT CHANGE UP (ref 150–400)
PLATELET # BLD AUTO: 290 K/UL — SIGNIFICANT CHANGE UP (ref 150–400)
PLATELET # BLD AUTO: 308 K/UL — SIGNIFICANT CHANGE UP (ref 150–400)
PLATELET # BLD AUTO: 308 K/UL — SIGNIFICANT CHANGE UP (ref 150–400)
POTASSIUM SERPL-MCNC: 3.4 MMOL/L — LOW (ref 3.5–5.3)
POTASSIUM SERPL-MCNC: 3.4 MMOL/L — LOW (ref 3.5–5.3)
POTASSIUM SERPL-MCNC: 3.7 MMOL/L — SIGNIFICANT CHANGE UP (ref 3.5–5.3)
POTASSIUM SERPL-MCNC: 3.7 MMOL/L — SIGNIFICANT CHANGE UP (ref 3.5–5.3)
POTASSIUM SERPL-SCNC: 3.4 MMOL/L — LOW (ref 3.5–5.3)
POTASSIUM SERPL-SCNC: 3.4 MMOL/L — LOW (ref 3.5–5.3)
POTASSIUM SERPL-SCNC: 3.7 MMOL/L — SIGNIFICANT CHANGE UP (ref 3.5–5.3)
POTASSIUM SERPL-SCNC: 3.7 MMOL/L — SIGNIFICANT CHANGE UP (ref 3.5–5.3)
PROT SERPL-MCNC: 7.4 G/DL — SIGNIFICANT CHANGE UP (ref 6–8.3)
PROT UR-MCNC: NEGATIVE MG/DL — SIGNIFICANT CHANGE UP
PROT UR-MCNC: NEGATIVE MG/DL — SIGNIFICANT CHANGE UP
PROTHROM AB SERPL-ACNC: 11 SEC — SIGNIFICANT CHANGE UP (ref 9.5–13)
PROTHROM AB SERPL-ACNC: 11 SEC — SIGNIFICANT CHANGE UP (ref 9.5–13)
PROTHROM AB SERPL-ACNC: 11.2 SEC — SIGNIFICANT CHANGE UP (ref 9.5–13)
PROTHROM AB SERPL-ACNC: 11.2 SEC — SIGNIFICANT CHANGE UP (ref 9.5–13)
RAPID RVP RESULT: SIGNIFICANT CHANGE UP
RAPID RVP RESULT: SIGNIFICANT CHANGE UP
RBC # BLD: 4.01 M/UL — SIGNIFICANT CHANGE UP (ref 3.8–5.2)
RBC # BLD: 4.01 M/UL — SIGNIFICANT CHANGE UP (ref 3.8–5.2)
RBC # BLD: 4.28 M/UL — SIGNIFICANT CHANGE UP (ref 3.8–5.2)
RBC # BLD: 4.28 M/UL — SIGNIFICANT CHANGE UP (ref 3.8–5.2)
RBC # FLD: 12.5 % — SIGNIFICANT CHANGE UP (ref 10.3–14.5)
RBC # FLD: 12.5 % — SIGNIFICANT CHANGE UP (ref 10.3–14.5)
RBC # FLD: 12.6 % — SIGNIFICANT CHANGE UP (ref 10.3–14.5)
RBC # FLD: 12.6 % — SIGNIFICANT CHANGE UP (ref 10.3–14.5)
RBC CASTS # UR COMP ASSIST: SIGNIFICANT CHANGE UP /HPF (ref 0–4)
RBC CASTS # UR COMP ASSIST: SIGNIFICANT CHANGE UP /HPF (ref 0–4)
SARS-COV-2 RNA SPEC QL NAA+PROBE: SIGNIFICANT CHANGE UP
SARS-COV-2 RNA SPEC QL NAA+PROBE: SIGNIFICANT CHANGE UP
SODIUM SERPL-SCNC: 139 MMOL/L — SIGNIFICANT CHANGE UP (ref 135–145)
SODIUM SERPL-SCNC: 139 MMOL/L — SIGNIFICANT CHANGE UP (ref 135–145)
SODIUM SERPL-SCNC: 143 MMOL/L — SIGNIFICANT CHANGE UP (ref 135–145)
SODIUM SERPL-SCNC: 143 MMOL/L — SIGNIFICANT CHANGE UP (ref 135–145)
SP GR SPEC: 1.01 — SIGNIFICANT CHANGE UP (ref 1–1.03)
SP GR SPEC: 1.01 — SIGNIFICANT CHANGE UP (ref 1–1.03)
TROPONIN I, HIGH SENSITIVITY RESULT: 22 NG/L — SIGNIFICANT CHANGE UP
TROPONIN I, HIGH SENSITIVITY RESULT: 22 NG/L — SIGNIFICANT CHANGE UP
TROPONIN I, HIGH SENSITIVITY RESULT: 22.5 NG/L — SIGNIFICANT CHANGE UP
TROPONIN I, HIGH SENSITIVITY RESULT: 22.5 NG/L — SIGNIFICANT CHANGE UP
TROPONIN I, HIGH SENSITIVITY RESULT: 26.3 NG/L — SIGNIFICANT CHANGE UP
TROPONIN I, HIGH SENSITIVITY RESULT: 26.3 NG/L — SIGNIFICANT CHANGE UP
UROBILINOGEN FLD QL: 0.2 MG/DL — SIGNIFICANT CHANGE UP (ref 0.2–1)
UROBILINOGEN FLD QL: 0.2 MG/DL — SIGNIFICANT CHANGE UP (ref 0.2–1)
WBC # BLD: 8.07 K/UL — SIGNIFICANT CHANGE UP (ref 3.8–10.5)
WBC # BLD: 8.07 K/UL — SIGNIFICANT CHANGE UP (ref 3.8–10.5)
WBC # BLD: 8.65 K/UL — SIGNIFICANT CHANGE UP (ref 3.8–10.5)
WBC # BLD: 8.65 K/UL — SIGNIFICANT CHANGE UP (ref 3.8–10.5)
WBC # FLD AUTO: 8.07 K/UL — SIGNIFICANT CHANGE UP (ref 3.8–10.5)
WBC # FLD AUTO: 8.07 K/UL — SIGNIFICANT CHANGE UP (ref 3.8–10.5)
WBC # FLD AUTO: 8.65 K/UL — SIGNIFICANT CHANGE UP (ref 3.8–10.5)
WBC # FLD AUTO: 8.65 K/UL — SIGNIFICANT CHANGE UP (ref 3.8–10.5)
WBC UR QL: SIGNIFICANT CHANGE UP /HPF (ref 0–5)
WBC UR QL: SIGNIFICANT CHANGE UP /HPF (ref 0–5)

## 2023-11-16 PROCEDURE — 99285 EMERGENCY DEPT VISIT HI MDM: CPT

## 2023-11-16 PROCEDURE — 93010 ELECTROCARDIOGRAM REPORT: CPT

## 2023-11-16 PROCEDURE — 71275 CT ANGIOGRAPHY CHEST: CPT | Mod: MA

## 2023-11-16 PROCEDURE — 87086 URINE CULTURE/COLONY COUNT: CPT

## 2023-11-16 PROCEDURE — 96375 TX/PRO/DX INJ NEW DRUG ADDON: CPT

## 2023-11-16 PROCEDURE — 71045 X-RAY EXAM CHEST 1 VIEW: CPT

## 2023-11-16 PROCEDURE — 85610 PROTHROMBIN TIME: CPT

## 2023-11-16 PROCEDURE — 99284 EMERGENCY DEPT VISIT MOD MDM: CPT

## 2023-11-16 PROCEDURE — 85730 THROMBOPLASTIN TIME PARTIAL: CPT

## 2023-11-16 PROCEDURE — 93971 EXTREMITY STUDY: CPT | Mod: 26,LT

## 2023-11-16 PROCEDURE — 83690 ASSAY OF LIPASE: CPT

## 2023-11-16 PROCEDURE — 36415 COLL VENOUS BLD VENIPUNCTURE: CPT

## 2023-11-16 PROCEDURE — 84702 CHORIONIC GONADOTROPIN TEST: CPT

## 2023-11-16 PROCEDURE — 71275 CT ANGIOGRAPHY CHEST: CPT | Mod: 26,MA

## 2023-11-16 PROCEDURE — 81001 URINALYSIS AUTO W/SCOPE: CPT

## 2023-11-16 PROCEDURE — 93005 ELECTROCARDIOGRAM TRACING: CPT

## 2023-11-16 PROCEDURE — 71045 X-RAY EXAM CHEST 1 VIEW: CPT | Mod: 26

## 2023-11-16 PROCEDURE — 0225U NFCT DS DNA&RNA 21 SARSCOV2: CPT

## 2023-11-16 PROCEDURE — 71045 X-RAY EXAM CHEST 1 VIEW: CPT | Mod: 26,77

## 2023-11-16 PROCEDURE — 85379 FIBRIN DEGRADATION QUANT: CPT

## 2023-11-16 PROCEDURE — 96365 THER/PROPH/DIAG IV INF INIT: CPT

## 2023-11-16 PROCEDURE — 99285 EMERGENCY DEPT VISIT HI MDM: CPT | Mod: 25

## 2023-11-16 PROCEDURE — 84484 ASSAY OF TROPONIN QUANT: CPT

## 2023-11-16 PROCEDURE — 83605 ASSAY OF LACTIC ACID: CPT

## 2023-11-16 PROCEDURE — 85025 COMPLETE CBC W/AUTO DIFF WBC: CPT

## 2023-11-16 PROCEDURE — 87040 BLOOD CULTURE FOR BACTERIA: CPT

## 2023-11-16 PROCEDURE — 93010 ELECTROCARDIOGRAM REPORT: CPT | Mod: 77

## 2023-11-16 PROCEDURE — 83735 ASSAY OF MAGNESIUM: CPT

## 2023-11-16 PROCEDURE — 93010 ELECTROCARDIOGRAM REPORT: CPT | Mod: 76

## 2023-11-16 PROCEDURE — 80053 COMPREHEN METABOLIC PANEL: CPT

## 2023-11-16 PROCEDURE — 83880 ASSAY OF NATRIURETIC PEPTIDE: CPT

## 2023-11-16 PROCEDURE — 93971 EXTREMITY STUDY: CPT

## 2023-11-16 RX ORDER — KETOROLAC TROMETHAMINE 30 MG/ML
15 SYRINGE (ML) INJECTION ONCE
Refills: 0 | Status: DISCONTINUED | OUTPATIENT
Start: 2023-11-16 | End: 2023-11-16

## 2023-11-16 RX ORDER — ACETAMINOPHEN 500 MG
1000 TABLET ORAL ONCE
Refills: 0 | Status: COMPLETED | OUTPATIENT
Start: 2023-11-16 | End: 2023-11-16

## 2023-11-16 RX ORDER — IBUPROFEN 200 MG
600 TABLET ORAL ONCE
Refills: 0 | Status: COMPLETED | OUTPATIENT
Start: 2023-11-16 | End: 2023-11-16

## 2023-11-16 RX ORDER — POTASSIUM CHLORIDE 20 MEQ
40 PACKET (EA) ORAL ONCE
Refills: 0 | Status: COMPLETED | OUTPATIENT
Start: 2023-11-16 | End: 2023-11-16

## 2023-11-16 RX ORDER — FAMOTIDINE 10 MG/ML
20 INJECTION INTRAVENOUS ONCE
Refills: 0 | Status: COMPLETED | OUTPATIENT
Start: 2023-11-16 | End: 2023-11-16

## 2023-11-16 RX ORDER — METOCLOPRAMIDE HCL 10 MG
10 TABLET ORAL ONCE
Refills: 0 | Status: COMPLETED | OUTPATIENT
Start: 2023-11-16 | End: 2023-11-16

## 2023-11-16 RX ORDER — SODIUM CHLORIDE 9 MG/ML
1000 INJECTION INTRAMUSCULAR; INTRAVENOUS; SUBCUTANEOUS ONCE
Refills: 0 | Status: COMPLETED | OUTPATIENT
Start: 2023-11-16 | End: 2023-11-16

## 2023-11-16 RX ORDER — DIPHENHYDRAMINE HCL 50 MG
25 CAPSULE ORAL ONCE
Refills: 0 | Status: COMPLETED | OUTPATIENT
Start: 2023-11-16 | End: 2023-11-16

## 2023-11-16 RX ADMIN — Medication 15 MILLIGRAM(S): at 20:40

## 2023-11-16 RX ADMIN — FAMOTIDINE 20 MILLIGRAM(S): 10 INJECTION INTRAVENOUS at 14:57

## 2023-11-16 RX ADMIN — Medication 400 MILLIGRAM(S): at 14:54

## 2023-11-16 RX ADMIN — Medication 1000 MILLIGRAM(S): at 15:30

## 2023-11-16 RX ADMIN — Medication 40 MILLIEQUIVALENT(S): at 16:56

## 2023-11-16 RX ADMIN — Medication 600 MILLIGRAM(S): at 10:04

## 2023-11-16 RX ADMIN — Medication 1000 MILLIGRAM(S): at 15:15

## 2023-11-16 RX ADMIN — Medication 30 MILLILITER(S): at 14:55

## 2023-11-16 RX ADMIN — Medication 600 MILLIGRAM(S): at 11:00

## 2023-11-16 RX ADMIN — Medication 15 MILLIGRAM(S): at 21:14

## 2023-11-16 RX ADMIN — SODIUM CHLORIDE 1000 MILLILITER(S): 9 INJECTION INTRAMUSCULAR; INTRAVENOUS; SUBCUTANEOUS at 14:54

## 2023-11-16 NOTE — ED ADULT NURSE NOTE - NSFALLUNIVINTERV_ED_ALL_ED
Bed/Stretcher in lowest position, wheels locked, appropriate side rails in place/Call bell, personal items and telephone in reach/Instruct patient to call for assistance before getting out of bed/chair/stretcher/Non-slip footwear applied when patient is off stretcher/[place] to call system/Physically safe environment - no spills, clutter or unnecessary equipment/Purposeful proactive rounding/Room/bathroom lighting operational, light cord in reach

## 2023-11-16 NOTE — ED ADULT TRIAGE NOTE - CHIEF COMPLAINT QUOTE
pt was discharged on monday with viral meningitis and c/o chest pain yesterday and tonight at 10 pm and also fever pt was discharged on monday with viral meningitis and c/o chest pain yesterday and tonight at 10 pm and also fever and took ibuprofen

## 2023-11-16 NOTE — ED PROVIDER NOTE - PATIENT PORTAL LINK FT
You can access the FollowMyHealth Patient Portal offered by [facility] by registering at the following website: [email or web address]. By joining [employer]’s FollowMyHealth portal, you will also be able to view your health information using other applications (apps) compatible with our system.

## 2023-11-16 NOTE — ED PROVIDER NOTE - SIGNIFICANT NEGATIVE FINDINGS
no headache, no rash no toxemia, no meningeal signs, no Syncope ,  no diaphoresis, no radiation, no abdominal pain, no n/v, no urinary symptoms,   no focal neuro changes.

## 2023-11-16 NOTE — ED ADULT TRIAGE NOTE - CHIEF COMPLAINT QUOTE
c/o chest pain- radiating to the left side of the back and pain to the left lower leg, was seen in the er and discharged today morning after seeing Dr Parsons

## 2023-11-16 NOTE — ED PROVIDER NOTE - CLINICAL SUMMARY MEDICAL DECISION MAKING FREE TEXT BOX
39 y/o female H/O Dx  viral meningitis on 10/16 , Post viral meningitis  paralysis and  intractable headaches.   Over the past two days she is experiencing intermittent and increasing episodes of  chest pains, mild SOB,  the symptoms began after starting the Sumatriptan and Topamax  .   The  chest pain increased this evening  at 7pm and 11pm, she noted that her bp was elevated at home 140mmhg systolic. . She is also having fever x 2 days Temp max 101F. no headache, no rash no toxemia, no meningeal signs, no Syncope ,  no diaphoresis, no radiation, no abdominal pain, no n/v, no urinary symptoms,   no focal neuro changes.  VS in triage /82 temp 97.4F Physical exam is stable , labs cxr ekg enzymes are normal, cleared by cardiology, discharged with nO/P referral provided

## 2023-11-16 NOTE — ED ADULT NURSE NOTE - OBJECTIVE STATEMENT
Pt c/o chest pain, sob, weakness, and paralysis on left side of body. Neuro assessment complete per flow sheet. Pt states pain on left side of body is a 9/10. Pt c/o pain on left leg and calf. Pt states pain in her leg is a 8/10.  No warmth or redness noted on left calf and leg. Pt states she was here in the ED last night for chest pain and was cleared to be d/c. EKG done at bedside and pt on bedside cardiac monitoring. Mother and significant other at bedside. care continues.

## 2023-11-16 NOTE — ED PROVIDER NOTE - PATIENT PORTAL LINK FT
You can access the FollowMyHealth Patient Portal offered by [facility] by registering at the following website: [email or web address]. By joining [employer]’s FollowMyHealth portal, you will also be able to view your health information using other applications (apps) compatible with our system.

## 2023-11-16 NOTE — ED PROVIDER NOTE - CLINICAL SUMMARY MEDICAL DECISION MAKING FREE TEXT BOX
repeat visit for chest pain. plan: check labs, treat symptoms, ekg/monitor, repeat XR chest. cards evaluation, discuss with neuro for potential medication change.

## 2023-11-16 NOTE — ED ADULT NURSE NOTE - SKIN INTEGRITY
"Skilled nurse visit in the home, for discontinuation of FLUOROURACIL 4370 mg IV infused over 46 hours.     Pt is relaxed, pleasant, denies any concerns or problems.  States, \"Is all good.\" Resp easy, regular; lungs clear.  States GI/ wnl.  Peripheral neuropathy is \"not bad\" and unchanged from prior dose of chemo.    Tori Gomes RN  [phone]   [email or web address]      " intact

## 2023-11-16 NOTE — ED ADULT NURSE REASSESSMENT NOTE - NS ED NURSE REASSESS COMMENT FT1
patient is A&Ox4. vital signs within normal limits for patient. patient denies chest pain, or shortness of breath.   safety precautions maintained.  will continue to assess and monitor for safety.
Assumed patient care at 0730, patient is AOx3, repeat troponin sent to lab by this RN 2/2 night shift reports he walked down troponin 2/2 tube station down but the lab lost it. Patient updated on plan of care, awaiting cardiology consult. Will continue to monitor.

## 2023-11-16 NOTE — ED PROVIDER NOTE - CARE PROVIDER_API CALL
Isaias Parsons  Cardiovascular Disease  [address] [place] [address]  Phone: [phone]  Fax: [phone]  Follow Up Time:

## 2023-11-16 NOTE — ED PROVIDER NOTE - OBJECTIVE STATEMENT
37 y/o female H/O Dx  viral meningitis on 10/16 , Post meningitis  paralysis and  intractable headaches.   She is taking  sumatriptan and is experiencing increasing  chest pains, sob x 2 day.   The  chest pain increased this evening  at 7pm. She is also having fever x 2 days Temp max 101F. no headache, no radiation, no diaphoresis, no n/v,  no focal  neuro changes. 37 y/o female H/O Dx  viral meningitis on 10/16 , Post viral meningitis  paralysis and  intractable headaches.   Over the past two days she is experiencing intermittent and increasing episodes of  chest pains, the symptoms began after starting the Sumatriptan .   The  chest pain increased this evening  at 7pm and 11pm, she noted that her bp was elevated at home 140mmhg systolic. . She is also having fever x 2 days Temp max 101F. no headache, no rash no toxemia, no meningeal signs, no SOB,  no diaphoresis, no radiation, no abdominal pain, no n/v, no urinary symptoms,   no focal neuro changes. 37 y/o female H/O Dx  viral meningitis on 10/16 , Post viral meningitis  paralysis and  intractable headaches.   Over the past two days she is experiencing intermittent and increasing episodes of  chest pains, mild SOB,  the symptoms began after starting the Sumatriptan .   The  chest pain increased this evening  at 7pm and 11pm, she noted that her bp was elevated at home 140mmhg systolic. . She is also having fever x 2 days Temp max 101F. no headache, no rash no toxemia, no meningeal signs, no Syncope ,  no diaphoresis, no radiation, no abdominal pain, no n/v, no urinary symptoms,   no focal neuro changes. 39 y/o female H/O Dx  viral meningitis on 10/16 , Post viral meningitis  paralysis and  intractable headaches.   Over the past two days she is experiencing intermittent and increasing episodes of  chest pains, mild SOB,  the symptoms began after starting the Sumatriptan and Topamax  .   The  chest pain increased this evening  at 7pm and 11pm, she noted that her bp was elevated at home 140mmhg systolic. . She is also having fever x 2 days Temp max 101F. no headache, no rash no toxemia, no meningeal signs, no Syncope ,  no diaphoresis, no radiation, no abdominal pain, no n/v, no urinary symptoms,   no focal neuro changes. 37 y/o female H/O Dx  viral meningitis on 10/16 , Post viral meningitis  paralysis and  intractable headaches.   Over the past two days she is experiencing intermittent and increasing episodes of  chest pains, mild SOB,  the symptoms began after starting the Sumatriptan and Topamax  .   The  chest pain increased this evening  at 7pm and 11pm, she noted that her bp was elevated at home 140mmhg systolic. . She is also having fever x 2 days Temp max 101F. no headache, no rash no toxemia, no meningeal signs, no Syncope ,  no diaphoresis, no radiation, no abdominal pain, no n/v, no urinary symptoms,   no focal neuro changes.  VS in triage /82 temp 97.4F

## 2023-11-16 NOTE — ED PROVIDER NOTE - CARE PROVIDER_API CALL
Isaias Parsons  Cardiovascular Disease  [address] [place] [address]  Phone: [phone]  Fax: [phone]  Follow Up Time: 1-3 Days

## 2023-11-16 NOTE — ED ADULT TRIAGE NOTE - PATIENT ON (OXYGEN DELIVERY METHOD)
muscle relaxer, pain meds, recommend follow up spine, no imaging in ED due to no red flags on HPI
room air

## 2023-11-16 NOTE — ED ADULT NURSE REASSESSMENT NOTE - STATUS
External INR result from Kimberley with [facility], result of 3.1. Goal range should be 2.0-3.0. The patient had an elevated INR on Wednesday and has held her warfarin 12/19 and will today, 12/20. Kimberley can be reached at [phone].    [facility] closed.  Routed to [facility]   awaiting consult

## 2023-11-16 NOTE — CONSULT NOTE ADULT - SUBJECTIVE AND OBJECTIVE BOX
Patient is a 38y old Female who presents with a chief complaint of chest pain.    HPI: 38F PMH of hashimoto's disease, hypothyroidism, hemochromatosis, viral meningitis on 10/16, post-viral meningitis, paralysis, migraines presents for CP. Patient was recently admitted to [facility] for headaches and facial numbness. MRI head was negative. Patient was diagnosed with migraines and discharged 3 days ago (23) and was prescribed sumatriptan and topiramate. Patient started taking sumtriptan and topiramate monday morning 3 days ago. After taking the medications 3 days ago, patient started to develop constant chest pain starting at around 6-7pm the same day. Patient describes chest pain as a constant burning sensation in the mid chest with associated palpitations, SOB, sweats, dizziness, and nausea that gets worse throughout the day (worst at night) and with exertion. Patient states chest pain is worse with inspiration. Patient switched her topiramate to qulipta 60mg qd 2 days ago as per Dr. Bowen without improvement of symptoms. Patient has been taking her migraine medications consistently until this morning. Ice packs and bed elevation helps relieve some chest pain . Pt also notes 2 episodes of fever tuesday night and yest night that improved with ibuprofen.  Pt admits to improvement of headache and numbness of face while on new medications but now has constipation. Denies rash, toxemia, meningeal signs, focal deficits, syncope, radiation of pain, abd pain, vomiting, diarrhea, urinary symptoms. Pt states she had had decreased PO intake of food and fluids since being discharged 3 days ago. As of note, patient presented to [facility] ED in 2018 for pleuritic chest pain and near syncope which was suspected to be vasovagal of nature without signs of acute ischemia so patient was discharged.    PAST MEDICAL & SURGICAL HISTORY:  Hemochromatosis      H/O Hashimoto thyroiditis      Hypothyroidism      Viral meningitis      No significant past surgical history        FAMILY HISTORY:  Hx of CAD and CHF of maternal and paternal grandparents      ROS:  Constitutional: +fever, +chills  HEENT: denies blurry vision, difficulty hearing  Respiratory: +SOB, +RIOS. cough  Cardiovascular: +CP, +palpitations. Denies orthopnea, PND, LE edema  Gastrointestinal: +nausea, +constipation. denies vomiting, abdominal pain  Genitourinary: denies urinary changes  Skin: Denies rashes, itching  Neurologic: +chronic headache and facial numbness (improving). Denies weakness, dizziness  Hematology/Oncology: denies bleeding, easy bruising  ROS negative except as noted above      SOCIAL HISTORY:    No tobacco, Alcohol or Ddrug use    Vital Signs Last 24 Hrs  T(C): 36.8 (2023 08:15), Max: 36.8 (2023 08:15)  T(F): 98.2 (2023 08:15), Max: 98.2 (2023 08:15)  HR: 86 (2023 08:15) (84 - 92)  BP: 109/72 (2023 08:15) (100/55 - 109/82)  BP(mean): --  RR: 18 (2023 08:15) (18 - 18)  SpO2: 97% (2023 08:15) (95% - 97%)    Parameters below as of 2023 08:15  Patient On (Oxygen Delivery Method): room air        Physical Exam:  General: Well developed, well nourished, NAD  HEENT: NCAT, PERRLA, EOMI bl, moist mucous membranes   Neck: Supple, nontender, no mass  Neurology: A&Ox3, nonfocal, sensation intact   Respiratory: CTA B/L, No W/R/R  CV: RRR, +S1/S2, no murmurs, rubs or gallops  Abdominal: mild epigastric tenderness. Soft, ND +BSx4, no palpable masses  Extremities: No C/C/E, + peripheral pulses  MSK: +mid-sternal tenderness. Normal ROM, no joint erythema or warmth, no joint swelling   Heme: No obvious ecchymosis or petechiae   Skin: warm, dry, normal color      ECG: NSR with q wave in lead III    I&O's Detail      LABS:                        13.3   8.65  )-----------( 290      ( 2023 03:20 )             37.9     11-16    139  |  107  |  9   ----------------------------<  147<H>  3.7   |  25  |  0.87    Ca    9.8      2023 03:20    TPro  7.4  /  Alb  4.1  /  TBili  0.4  /  DBili  x   /  AST  16  /  ALT  33  /  AlkPhos  62  11-16        PT/INR - ( 2023 03:20 )   PT: 11.0 sec;   INR: 0.94 ratio         PTT - ( 2023 03:20 )  PTT:30.8 sec  Urinalysis Basic - ( 2023 04:00 )    Color: Yellow / Appearance: Clear / S.006 / pH: x  Gluc: x / Ketone: Negative mg/dL  / Bili: Negative / Urobili: 0.2 mg/dL   Blood: x / Protein: Negative mg/dL / Nitrite: Negative   Leuk Esterase: Trace / RBC: 0-2 /HPF / WBC 0-2 /HPF   Sq Epi: x / Non Sq Epi: x / Bacteria: Many /HPF      Radiology:  < from: CT Angio Chest PE Protocol w/ IV Cont (23 @ 05:24) >    ACC: [id number] EXAM:  CT ANGIO CHEST PULM ART WAWIC   ORDERED BY:   SANDOR GLEASON     PROCEDURE DATE:  2023          INTERPRETATION:  CLINICAL INFORMATION: Chest pain, shortness of breath    COMPARISON: CTA chest 2018    CONTRAST/COMPLICATIONS:  IV Contrast: Omnipaque 350  70 cc administered   30 cc discarded  Oral Contrast: NONE  Complications: None reported at time of study completion    PROCEDURE:  CT Angiography of the Chest.  Sagittal and coronal reformats were performed as well as 3D (MIP)   reconstructions.    FINDINGS:    LUNGS AND AIRWAYS: Patent central airways.  Lungs are clear.  PLEURA: No pleural effusion.  MEDIASTINUM AND VANCE: No lymphadenopathy.  VESSELS: Evaluation limited to the proximal segmental level due to   suboptimal opacification. Within these limitations, no obvious central   pulmonary embolism. The main pulmonary artery is normal in caliber.  HEART: Heart size is normal. No pericardial effusion.  CHEST WALL AND LOWER NECK: Within normal limits.  VISUALIZED UPPER ABDOMEN: Within normal limits.  BONES: Within normal limits.    IMPRESSION:  No evidence of central pulmonary embolism, although distal evaluation is   limited due to suboptimal bolus opacification. If there is persistent   clinical concern, consider repeat examination.        --- End of Report ---             BRITTANY LAZO MD; Attending Radiologist  This document has been electronically signed. 2023  5:24AM    < end of copied text >

## 2023-11-16 NOTE — ED PROVIDER NOTE - NSFOLLOWUPINSTRUCTIONS_ED_ALL_ED_FT
Please take ibuprofen or naproxen as needed for pain   Stop sumatriptan  Discuss with your neurologist other options for migraine prevention   Seek Medical attention or return to the emergency department for any new or worsening symptoms.  Follow up with cardiology Dr. Parsons

## 2023-11-16 NOTE — ED PROVIDER NOTE - DIFFERENTIAL DIAGNOSIS
pancreatitis, pneumothorax, gastritis, gastric perforation, doubt acs, doubt PE. AAA considered. Differential Diagnosis

## 2023-11-16 NOTE — ED PROVIDER NOTE - NSFOLLOWUPINSTRUCTIONS_ED_ALL_ED_FT
CHEST PAIN - AfterCare(R) Instructions(ER/ED)    Chest Pain    WHAT YOU NEED TO KNOW:    Chest pain can be caused by a range of conditions, from not serious to life-threatening. Chest pain can be a symptom of a digestive problem, such as acid reflux or a stomach ulcer. An anxiety attack or a strong emotion, such as anger, can also cause chest pain. Infection, inflammation, or a fracture in the bones or cartilage in your chest can cause pain or discomfort. Sometimes chest pain or pressure is caused by poor blood flow to your heart (angina). Chest pain may also be caused by life-threatening conditions such as a heart attack or blood clot in your lungs.    DISCHARGE INSTRUCTIONS:    Call your local emergency number (911 in the [place]) or have someone call if:    You have any of the following signs of a heart attack:  Squeezing, pressure, or pain in your chest    You may also have any of the following:  Discomfort or pain in your back, neck, jaw, stomach, or arm    Shortness of breath    Nausea or vomiting    Lightheadedness or a sudden cold sweat    Return to the emergency department if:    You have chest discomfort that gets worse, even with medicine.    You cough or vomit blood.    Your bowel movements are black or bloody.    You cannot stop vomiting, or it hurts to swallow.  Call your doctor if:    You have questions or concerns about your condition or care.    Medicines:    Medicines may be given to treat the cause of your chest pain. Examples include pain medicine, anxiety medicine, or medicines to increase blood flow to your heart.    Do not take certain medicines without asking your healthcare provider first. These include NSAIDs, herbal or vitamin supplements, and hormones, such as estrogen or progestin.    Take your medicine as directed. Contact your healthcare provider if you think your medicine is not helping or if you have side effects. Tell your provider if you are allergic to any medicine. Keep a list of the medicines, vitamins, and herbs you take. Include the amounts, and when and why you take them. Bring the list or the pill bottles to follow-up visits. Carry your medicine list with you in case of an emergency.  Healthy living tips: If the cause of your chest pain is known, your healthcare provider will give you specific guidelines to follow. The following are general healthy guidelines:    Do not smoke. Nicotine and other chemicals in cigarettes and cigars can cause lung and heart damage. Ask your healthcare provider for information if you currently smoke and need help to quit. E-cigarettes or smokeless tobacco still contain nicotine. Talk to your healthcare provider before you use these products.    Choose a variety of healthy foods as often as possible. Include fresh, frozen, or canned fruits and vegetables. Also include low-fat dairy products, fish, chicken (without skin), and lean meats. Your healthcare provider or a dietitian can help you create meal plans. You may need to avoid certain foods or drinks if your pain is caused by a digestion problem.  Healthy Foods      Lower your sodium (salt) intake. Limit foods that are high in sodium, such as canned foods, salty snacks, and cold cuts. If you add salt when you cook food, do not add more at the table. Choose low-sodium canned foods as much as possible.        Drink plenty of water every day. Water helps your body to control your temperature and blood pressure. Ask your healthcare provider how much water you should drink every day.    Ask about activity. Your healthcare provider will tell you which activities to limit or avoid. Ask when you can drive, return to work, and have sex. Ask about the best exercise plan for you.    Maintain a healthy weight. Ask your healthcare provider what a healthy weight is for you. Ask him or her to help you create a safe weight loss plan if you are overweight.    Ask about vaccines you may need. Your healthcare provider can tell you which vaccines you need, and when to get them. The following vaccines help prevent diseases that can become serious for a person with a heart condition:  The influenza (flu) vaccine is given each year. Get a flu vaccine as soon as recommended, usually in September or October.    The pneumonia vaccine is usually given every 5 years. Your healthcare provider may recommend the pneumonia vaccine if you are 65 or older.    COVID-19 vaccines are given to adults as a shot in 1 or 2 doses. Vaccination is recommended for all adults. A booster (additional) dose is also recommended for all adults. A second booster is recommended for all adults 50 or older and for immunocompromised adults 18 or older. The second booster is also recommended for adults who received the 1-dose vaccine for the first and booster doses. Your healthcare provider can tell you when to get one or both boosters.  Prevent Heart Disease   Follow up with your doctor within 72 hours, or as directed: You may need to return for more tests to find the cause of your chest pain. You may be referred to a specialist, such as a cardiologist or gastroenterologist. Write down your questions so you remember to ask them during your visits.

## 2023-11-16 NOTE — ED PROVIDER NOTE - PROGRESS NOTE DETAILS
Family requesting alternative provider- concerns elevated to admin. patient calm when interviewed alone.     After Mom and significant other arrive, called to room: patient states she feels short of breath spO2 100% on room air. patient hyperventilating and with tachycardia. on exam lungs clear. patient also reports she feels her tongue is swollen, no tongue swelling appreciated. Oropharynx widely patent and without edema. patient also reports her face feels paralyzed and she has paralyzation of the left side of her body but patient speaking normally and  moving her left arm to scratch nose. no headache. patient and family re-assured. call placed to Dr Bowen, neurologist who saw patient on last admission. He feels anxiety is playing large role. Numbness and paralysis felt to be part of her migraine syndrome. patient currently taking Qulipta samples and was told to follow up with another neurologist as Dr Bowen does not take her insurance. Recommending reglan and benadryl for treatment of migraine symptoms.    Patient placed on O2 for comfort. after some time, HR improved and repeat ekg sinus tachycardia at 109. Des: I reassess patient. She feels much improved. Discussed in detail her results with no imminent findings. Recommend that she follow up with Dr. Parsons, her neurologist and stop sumatriptan.

## 2023-11-16 NOTE — ED ADULT NURSE NOTE - OBJECTIVE STATEMENT
pt complains of CP/ Agita / SOB beginning last night. pt states that she believes this a reaction to her sumatriptan. pt was here 10/17 for dx of meningitis  along with constant migraines. pt has PMH of hashimoto's and hemachromatosis. patient is A&Ox4. vital signs within normal limits for patient. patient denies chest pain, or shortness of breath.    safety precautions maintained.  will continue to assess and monitor for safety.

## 2023-11-16 NOTE — ED PROVIDER NOTE - OBJECTIVE STATEMENT
38 F return visit for chest pain. patient seen here this morning for chest pain. had work up and was cleared by cardiology. patient went home and had severe upper back and chest pain with associated shortness of breath. patient also notes recent fever for the past few days. also with feeling of "agita" and feeling "queasy" with reported nausea, vomiting. patient with slight dry cough. patient recently hospitalized for severe headaches. Started on Sumatriptan and Topamax which was switched to something that starts with a G in the office. patient also with associated lightheadedness and dizziness. patient also reports left thigh pain.    PMD Klaudia  Cards Savella  Neuro Andrés

## 2023-11-16 NOTE — CONSULT NOTE ADULT - ATTENDING COMMENTS
atypical reproducible chest pain  normal ekg and troponins are negative  no suspicion for acs  pain likely of msk etiology and would give nsaids  no issue with dc home.

## 2023-11-16 NOTE — CONSULT NOTE ADULT - ASSESSMENT
38F PMH of hashimoto's disease, hypothyroidism, hemochromatosis, viral meningitis on 10/16, post-viral meningitis, paralysis, migraines presents for CP. Consulted for CP    #chest pain:  - patient presents with constant pleuritic chest pain and associated SOB worse with exertion likely medication induced (recently started on sumatriptan, topiramate, and qulipta)  - ECG: NSR with q wave in lead III  - Patient is   - No clear evidence of acute ischemia, trops negative x 1  - No acute changes on EKG compared to previous.  - No meaningful evidence of volume overload.  - BP well controlled, monitor routine hemodynamics.  - Continue ___.                 38F PMH of hashimoto's disease, hypothyroidism, hemochromatosis, viral meningitis on 10/16, post-viral meningitis, paralysis, migraines presents for CP. Consulted for CP    #chest pain:  - patient presents with constant atypical pleuritic chest pain and associated SOB worse with exertion likely medication induced (recently started on sumatriptan, topiramate, and qulipta) vs costochondritis  - ECG: NSR with q wave in lead III  - No clear evidence of acute ischemia, trops negative x 1  - No acute changes on EKG compared to previous.  - low concern for ACS at this time  - No meaningful evidence of volume overload.  - ordered toradol 15mg IVP x1  - pt cleared for discharge from cardiac standpoint with close outpatient followup and further testing                 38F PMH of hashimoto's disease, hypothyroidism, hemochromatosis, viral meningitis on 10/16, post-viral meningitis, paralysis, migraines presents for CP. Consulted for CP    #chest pain:  - patient presents with constant atypical pleuritic chest pain and associated SOB worse with exertion likely medication induced (recently started on sumatriptan, topiramate, and qulipta) vs costochondritis  - ECG: NSR with q wave in lead III  - No clear evidence of acute ischemia, trops negative x 1  - No acute changes on EKG compared to previous.  - low concern for ACS at this time  - No meaningful evidence of volume overload.  - ordered motrin 600mg po x1  - pt cleared for discharge from cardiac standpoint with close outpatient followup and further testing                 38F PMH of hashimoto's disease, hypothyroidism, hemochromatosis, viral meningitis on 10/16, post-viral meningitis, paralysis, migraines presents for CP. Consulted for CP    #chest pain:  - patient presents with constant atypical pleuritic chest pain and associated SOB worse with exertion likely medication induced (recently started on sumatriptan, topiramate, and qulipta) vs costochondritis  - ECG: NSR with q wave in lead III  - No clear evidence of acute ischemia, trops negative x 1  - No acute changes on EKG compared to previous.  - low concern for ACS at this time  - D-dimer elevated but CTA chest negative for PE  - No meaningful evidence of volume overload.  - ordered motrin 600mg po x1  - pt cleared for discharge from cardiac standpoint with close outpatient followup and further testing                 38F PMH of hashimoto's disease, hypothyroidism, hemochromatosis, viral meningitis on 10/16, post-viral meningitis, paralysis, migraines presents for CP. Consulted for CP    #chest pain:  - patient presents with constant atypical pleuritic chest pain and associated SOB worse with exertion likely medication induced (recently started on sumatriptan, topiramate, and qulipta) vs costochondritis  - ECG: NSR with q wave in lead III  - No clear evidence of acute ischemia, trops negative x 1  - No acute changes on EKG compared to previous.  - low concern for ACS at this time  - D-dimer elevated but CTA chest negative for PE  - No meaningful evidence of volume overload.  - give nsaids  - pt cleared for discharge from cardiac standpoint with close outpatient followup and further testing

## 2023-11-16 NOTE — ED PROVIDER NOTE - MDM ORDERS SUBMITTED SELECTION
Labs/EKG/Imaging Studies [place] Counseling:  I discussed with the patient the risks of imiquimod including but not limited to erythema, scaling, itching, weeping, crusting, and pain.  Patient understands that the inflammatory response to imiquimod is variable from person to person and was educated regarded proper titration schedule.  If flu-like symptoms develop, patient knows to discontinue the medication and contact us.

## 2023-11-16 NOTE — ED PROVIDER NOTE - PROGRESS NOTE DETAILS
savella (fred) seen eval pt, cleared for d/c and outpt f/u.  Reevaluated patient at bedside.  Patient feeling much improved.  Discussed the results of all diagnostic testing in ED and copies of all reports given.   An opportunity to ask questions was given.  Discussed the importance of prompt, close medical follow-up.  Patient will return with any changes, concerns or persistent / worsening symptoms.  Understanding of all instructions verbalized.

## 2023-11-16 NOTE — ED ADULT NURSE REASSESSMENT NOTE - NS ED NURSE REASSESS COMMENT FT1
patient verbalizes she wants a different doctor. refusing benadryl and reglan. reports she was told by another doctor that she cant have anymore "headache medicine." Dr Caba aware. remains on o2 placed by MD for comfort. o2 sat WNL
Pt was instructed to follow up with cardiologist, and PCP. she was instructed to take Motrin and naproxen as instructed on discharge plan with food. Instructed to return to Ed if indicated> verbalized understanding

## 2023-11-16 NOTE — ED ADULT NURSE NOTE - CHIEF COMPLAINT QUOTE
pt was discharged on monday with viral meningitis and c/o chest pain yesterday and tonight at 10 pm and also fever and took ibuprofen

## 2023-11-18 ENCOUNTER — LABORATORY RESULT (OUTPATIENT)
Age: 38
End: 2023-11-18

## 2023-11-18 ENCOUNTER — APPOINTMENT (OUTPATIENT)
Dept: INTERNAL MEDICINE | Facility: CLINIC | Age: 38
End: 2023-11-18
Payer: COMMERCIAL

## 2023-11-18 VITALS
RESPIRATION RATE: 14 BRPM | SYSTOLIC BLOOD PRESSURE: 120 MMHG | DIASTOLIC BLOOD PRESSURE: 76 MMHG | TEMPERATURE: 97.9 F | OXYGEN SATURATION: 99 % | HEART RATE: 93 BPM

## 2023-11-18 DIAGNOSIS — E03.9 HYPOTHYROIDISM, UNSPECIFIED: ICD-10-CM

## 2023-11-18 DIAGNOSIS — R30.0 DYSURIA: ICD-10-CM

## 2023-11-18 LAB
T3 SERPL-MCNC: 73 NG/DL
T4 FREE SERPL-MCNC: 1.4 NG/DL
TSH SERPL-ACNC: 3.11 UIU/ML

## 2023-11-18 PROCEDURE — 99214 OFFICE O/P EST MOD 30 MIN: CPT

## 2023-11-18 RX ORDER — ESCITALOPRAM OXALATE 5 MG/1
5 TABLET ORAL DAILY
Qty: 30 | Refills: 2 | Status: ACTIVE | COMMUNITY
Start: 2023-11-18 | End: 1900-01-01

## 2023-11-18 RX ORDER — LEVOTHYROXINE SODIUM 0.03 MG/1
25 TABLET ORAL
Qty: 30 | Refills: 3 | Status: DISCONTINUED | COMMUNITY
Start: 2023-06-22 | End: 2023-11-18

## 2023-11-19 LAB
APPEARANCE: CLEAR
BILIRUBIN URINE: NEGATIVE
BLOOD URINE: ABNORMAL
COLOR: YELLOW
GLUCOSE QUALITATIVE U: NEGATIVE MG/DL
KETONES URINE: NEGATIVE MG/DL
LEUKOCYTE ESTERASE URINE: ABNORMAL
NITRITE URINE: NEGATIVE
PH URINE: 6
PROTEIN URINE: 30 MG/DL
SPECIFIC GRAVITY URINE: 1.01
THYROPEROXIDASE AB SERPL IA-ACNC: 590 IU/ML
UROBILINOGEN URINE: 0.2 MG/DL

## 2023-11-20 ENCOUNTER — TRANSCRIPTION ENCOUNTER (OUTPATIENT)
Age: 38
End: 2023-11-20

## 2023-11-20 ENCOUNTER — NON-APPOINTMENT (OUTPATIENT)
Age: 38
End: 2023-11-20

## 2023-11-20 LAB — TSI ACT/NOR SER: <0.1 IU/L

## 2023-11-21 ENCOUNTER — APPOINTMENT (OUTPATIENT)
Dept: CARDIOLOGY | Facility: CLINIC | Age: 38
End: 2023-11-21
Payer: COMMERCIAL

## 2023-11-21 ENCOUNTER — NON-APPOINTMENT (OUTPATIENT)
Age: 38
End: 2023-11-21

## 2023-11-21 VITALS
OXYGEN SATURATION: 97 % | HEIGHT: 62 IN | SYSTOLIC BLOOD PRESSURE: 105 MMHG | WEIGHT: 140 LBS | DIASTOLIC BLOOD PRESSURE: 71 MMHG | BODY MASS INDEX: 25.76 KG/M2 | HEART RATE: 84 BPM

## 2023-11-21 DIAGNOSIS — R07.89 OTHER CHEST PAIN: ICD-10-CM

## 2023-11-21 LAB
CULTURE RESULTS: SIGNIFICANT CHANGE UP
SPECIMEN SOURCE: SIGNIFICANT CHANGE UP

## 2023-11-21 PROCEDURE — 93000 ELECTROCARDIOGRAM COMPLETE: CPT

## 2023-11-21 PROCEDURE — 99214 OFFICE O/P EST MOD 30 MIN: CPT | Mod: 25

## 2023-11-22 ENCOUNTER — APPOINTMENT (OUTPATIENT)
Dept: NEUROLOGY | Facility: CLINIC | Age: 38
End: 2023-11-22
Payer: COMMERCIAL

## 2023-11-22 ENCOUNTER — APPOINTMENT (OUTPATIENT)
Dept: CARDIOLOGY | Facility: CLINIC | Age: 38
End: 2023-11-22
Payer: COMMERCIAL

## 2023-11-22 VITALS
WEIGHT: 140 LBS | HEART RATE: 92 BPM | DIASTOLIC BLOOD PRESSURE: 76 MMHG | HEIGHT: 62 IN | SYSTOLIC BLOOD PRESSURE: 108 MMHG | BODY MASS INDEX: 25.76 KG/M2

## 2023-11-22 DIAGNOSIS — Z82.49 FAMILY HISTORY OF ISCHEMIC HEART DISEASE AND OTHER DISEASES OF THE CIRCULATORY SYSTEM: ICD-10-CM

## 2023-11-22 DIAGNOSIS — Z80.1 FAMILY HISTORY OF MALIGNANT NEOPLASM OF TRACHEA, BRONCHUS AND LUNG: ICD-10-CM

## 2023-11-22 DIAGNOSIS — Z82.0 FAMILY HISTORY OF EPILEPSY AND OTHER DISEASES OF THE NERVOUS SYSTEM: ICD-10-CM

## 2023-11-22 DIAGNOSIS — Z78.9 OTHER SPECIFIED HEALTH STATUS: ICD-10-CM

## 2023-11-22 PROCEDURE — 99205 OFFICE O/P NEW HI 60 MIN: CPT

## 2023-11-22 PROCEDURE — 93306 TTE W/DOPPLER COMPLETE: CPT

## 2023-11-28 ENCOUNTER — APPOINTMENT (OUTPATIENT)
Dept: NEUROLOGY | Facility: CLINIC | Age: 38
End: 2023-11-28
Payer: COMMERCIAL

## 2023-11-28 VITALS
BODY MASS INDEX: 25.76 KG/M2 | HEIGHT: 62 IN | WEIGHT: 140 LBS | SYSTOLIC BLOOD PRESSURE: 105 MMHG | DIASTOLIC BLOOD PRESSURE: 72 MMHG | HEART RATE: 84 BPM

## 2023-11-28 PROCEDURE — 99204 OFFICE O/P NEW MOD 45 MIN: CPT

## 2023-11-28 PROCEDURE — 99214 OFFICE O/P EST MOD 30 MIN: CPT

## 2023-11-29 DIAGNOSIS — R07.9 CHEST PAIN, UNSPECIFIED: ICD-10-CM

## 2023-11-30 ENCOUNTER — APPOINTMENT (OUTPATIENT)
Dept: INFECTIOUS DISEASE | Facility: CLINIC | Age: 38
End: 2023-11-30
Payer: COMMERCIAL

## 2023-11-30 DIAGNOSIS — A87.9 VIRAL MENINGITIS, UNSPECIFIED: ICD-10-CM

## 2023-11-30 PROCEDURE — 99213 OFFICE O/P EST LOW 20 MIN: CPT

## 2023-12-02 ENCOUNTER — NON-APPOINTMENT (OUTPATIENT)
Age: 38
End: 2023-12-02

## 2023-12-18 ENCOUNTER — APPOINTMENT (OUTPATIENT)
Dept: INTERNAL MEDICINE | Facility: CLINIC | Age: 38
End: 2023-12-18
Payer: COMMERCIAL

## 2023-12-18 VITALS
TEMPERATURE: 98.2 F | HEIGHT: 62 IN | SYSTOLIC BLOOD PRESSURE: 112 MMHG | RESPIRATION RATE: 16 BRPM | WEIGHT: 140 LBS | DIASTOLIC BLOOD PRESSURE: 74 MMHG | BODY MASS INDEX: 25.76 KG/M2 | HEART RATE: 82 BPM | OXYGEN SATURATION: 98 %

## 2023-12-18 PROCEDURE — 99214 OFFICE O/P EST MOD 30 MIN: CPT

## 2023-12-18 RX ORDER — NITROFURANTOIN (MONOHYDRATE/MACROCRYSTALS) 25; 75 MG/1; MG/1
100 CAPSULE ORAL
Qty: 14 | Refills: 0 | Status: DISCONTINUED | COMMUNITY
Start: 2023-11-19 | End: 2023-12-18

## 2023-12-18 NOTE — HEALTH RISK ASSESSMENT
[0] : 2) Feeling down, depressed, or hopeless: Not at all (0) [PHQ-2 Negative - No further assessment needed] : PHQ-2 Negative - No further assessment needed [[id number]] : 0

## 2023-12-18 NOTE — HISTORY OF PRESENT ILLNESS
[de-identified] : 38F presents for follow-up of viral meningitis, anxiety, hashimoto's. For hashimoto's disease, she has stopped levothyroxine and is due for bloodwork. For anxiety, reports that the escitalopram 5mg has helped. For meningitis, she reports headache, numbness, paralysis has decreased in intensity and frequency.

## 2023-12-18 NOTE — ASSESSMENT
[FreeTextEntry1] : Anxiety: Controlled. Continue escitalopram 5mg daily.  Hashimoto's disease: Check TSH, Free T4, T3. Treatment based on results.  Meningitis: She is following with Dr Lincoln.

## 2023-12-18 NOTE — REVIEW OF SYSTEMS
[Fever] : no fever [Chills] : no chills [Night Sweats] : no night sweats [Chest Pain] : no chest pain [Palpitations] : no palpitations [Lower Ext Edema] : no lower extremity edema [Shortness Of Breath] : no shortness of breath [Wheezing] : no wheezing [Cough] : no cough [Dyspnea on Exertion] : no dyspnea on exertion [Abdominal Pain] : no abdominal pain [Nausea] : no nausea [Constipation] : no constipation [Diarrhea] : diarrhea [Vomiting] : no vomiting [Melena] : no melena [Dysuria] : no dysuria [Muscle Weakness] : muscle weakness

## 2023-12-19 ENCOUNTER — INPATIENT (INPATIENT)
Facility: HOSPITAL | Age: 38
LOS: 3 days | Discharge: HOME | DRG: 99 | End: 2023-12-23
Attending: PSYCHIATRY & NEUROLOGY | Admitting: PSYCHIATRY & NEUROLOGY
Payer: COMMERCIAL

## 2023-12-19 VITALS
TEMPERATURE: 98 F | HEIGHT: 62 IN | RESPIRATION RATE: 18 BRPM | SYSTOLIC BLOOD PRESSURE: 101 MMHG | HEART RATE: 78 BPM | WEIGHT: 141.1 LBS | OXYGEN SATURATION: 97 % | DIASTOLIC BLOOD PRESSURE: 70 MMHG

## 2023-12-19 DIAGNOSIS — G93.9 DISORDER OF BRAIN, UNSPECIFIED: ICD-10-CM

## 2023-12-19 LAB
ANION GAP SERPL CALC-SCNC: 11 MMOL/L — SIGNIFICANT CHANGE UP (ref 5–17)
ANION GAP SERPL CALC-SCNC: 11 MMOL/L — SIGNIFICANT CHANGE UP (ref 5–17)
APPEARANCE UR: CLEAR — SIGNIFICANT CHANGE UP
APPEARANCE UR: CLEAR — SIGNIFICANT CHANGE UP
BILIRUB UR-MCNC: NEGATIVE — SIGNIFICANT CHANGE UP
BILIRUB UR-MCNC: NEGATIVE — SIGNIFICANT CHANGE UP
BUN SERPL-MCNC: 14 MG/DL — SIGNIFICANT CHANGE UP (ref 7–23)
BUN SERPL-MCNC: 14 MG/DL — SIGNIFICANT CHANGE UP (ref 7–23)
CALCIUM SERPL-MCNC: 8.8 MG/DL — SIGNIFICANT CHANGE UP (ref 8.4–10.5)
CALCIUM SERPL-MCNC: 8.8 MG/DL — SIGNIFICANT CHANGE UP (ref 8.4–10.5)
CHLORIDE SERPL-SCNC: 104 MMOL/L — SIGNIFICANT CHANGE UP (ref 96–108)
CHLORIDE SERPL-SCNC: 104 MMOL/L — SIGNIFICANT CHANGE UP (ref 96–108)
CO2 SERPL-SCNC: 23 MMOL/L — SIGNIFICANT CHANGE UP (ref 22–31)
CO2 SERPL-SCNC: 23 MMOL/L — SIGNIFICANT CHANGE UP (ref 22–31)
COLOR SPEC: YELLOW — SIGNIFICANT CHANGE UP
COLOR SPEC: YELLOW — SIGNIFICANT CHANGE UP
CREAT SERPL-MCNC: 0.75 MG/DL — SIGNIFICANT CHANGE UP (ref 0.5–1.3)
CREAT SERPL-MCNC: 0.75 MG/DL — SIGNIFICANT CHANGE UP (ref 0.5–1.3)
DIFF PNL FLD: NEGATIVE — SIGNIFICANT CHANGE UP
DIFF PNL FLD: NEGATIVE — SIGNIFICANT CHANGE UP
EGFR: 104 ML/MIN/1.73M2 — SIGNIFICANT CHANGE UP
EGFR: 104 ML/MIN/1.73M2 — SIGNIFICANT CHANGE UP
GLUCOSE SERPL-MCNC: 118 MG/DL — HIGH (ref 70–99)
GLUCOSE SERPL-MCNC: 118 MG/DL — HIGH (ref 70–99)
GLUCOSE UR QL: NEGATIVE MG/DL — SIGNIFICANT CHANGE UP
GLUCOSE UR QL: NEGATIVE MG/DL — SIGNIFICANT CHANGE UP
HCT VFR BLD CALC: 34.8 % — SIGNIFICANT CHANGE UP (ref 34.5–45)
HCT VFR BLD CALC: 34.8 % — SIGNIFICANT CHANGE UP (ref 34.5–45)
HGB BLD-MCNC: 12.1 G/DL — SIGNIFICANT CHANGE UP (ref 11.5–15.5)
HGB BLD-MCNC: 12.1 G/DL — SIGNIFICANT CHANGE UP (ref 11.5–15.5)
KETONES UR-MCNC: ABNORMAL MG/DL
KETONES UR-MCNC: ABNORMAL MG/DL
LEUKOCYTE ESTERASE UR-ACNC: NEGATIVE — SIGNIFICANT CHANGE UP
LEUKOCYTE ESTERASE UR-ACNC: NEGATIVE — SIGNIFICANT CHANGE UP
MAGNESIUM SERPL-MCNC: 2.2 MG/DL — SIGNIFICANT CHANGE UP (ref 1.6–2.6)
MAGNESIUM SERPL-MCNC: 2.2 MG/DL — SIGNIFICANT CHANGE UP (ref 1.6–2.6)
MCHC RBC-ENTMCNC: 31.3 PG — SIGNIFICANT CHANGE UP (ref 27–34)
MCHC RBC-ENTMCNC: 31.3 PG — SIGNIFICANT CHANGE UP (ref 27–34)
MCHC RBC-ENTMCNC: 34.8 GM/DL — SIGNIFICANT CHANGE UP (ref 32–36)
MCHC RBC-ENTMCNC: 34.8 GM/DL — SIGNIFICANT CHANGE UP (ref 32–36)
MCV RBC AUTO: 90.2 FL — SIGNIFICANT CHANGE UP (ref 80–100)
MCV RBC AUTO: 90.2 FL — SIGNIFICANT CHANGE UP (ref 80–100)
NITRITE UR-MCNC: NEGATIVE — SIGNIFICANT CHANGE UP
NITRITE UR-MCNC: NEGATIVE — SIGNIFICANT CHANGE UP
NRBC # BLD: 0 /100 WBCS — SIGNIFICANT CHANGE UP (ref 0–0)
NRBC # BLD: 0 /100 WBCS — SIGNIFICANT CHANGE UP (ref 0–0)
PH UR: 5 — SIGNIFICANT CHANGE UP (ref 5–8)
PH UR: 5 — SIGNIFICANT CHANGE UP (ref 5–8)
PHOSPHATE SERPL-MCNC: 3.2 MG/DL — SIGNIFICANT CHANGE UP (ref 2.5–4.5)
PHOSPHATE SERPL-MCNC: 3.2 MG/DL — SIGNIFICANT CHANGE UP (ref 2.5–4.5)
PLATELET # BLD AUTO: 289 K/UL — SIGNIFICANT CHANGE UP (ref 150–400)
PLATELET # BLD AUTO: 289 K/UL — SIGNIFICANT CHANGE UP (ref 150–400)
POTASSIUM SERPL-MCNC: 3.8 MMOL/L — SIGNIFICANT CHANGE UP (ref 3.5–5.3)
POTASSIUM SERPL-MCNC: 3.8 MMOL/L — SIGNIFICANT CHANGE UP (ref 3.5–5.3)
POTASSIUM SERPL-SCNC: 3.8 MMOL/L — SIGNIFICANT CHANGE UP (ref 3.5–5.3)
POTASSIUM SERPL-SCNC: 3.8 MMOL/L — SIGNIFICANT CHANGE UP (ref 3.5–5.3)
PROT UR-MCNC: NEGATIVE MG/DL — SIGNIFICANT CHANGE UP
PROT UR-MCNC: NEGATIVE MG/DL — SIGNIFICANT CHANGE UP
RBC # BLD: 3.86 M/UL — SIGNIFICANT CHANGE UP (ref 3.8–5.2)
RBC # BLD: 3.86 M/UL — SIGNIFICANT CHANGE UP (ref 3.8–5.2)
RBC # FLD: 12.3 % — SIGNIFICANT CHANGE UP (ref 10.3–14.5)
RBC # FLD: 12.3 % — SIGNIFICANT CHANGE UP (ref 10.3–14.5)
SODIUM SERPL-SCNC: 138 MMOL/L — SIGNIFICANT CHANGE UP (ref 135–145)
SODIUM SERPL-SCNC: 138 MMOL/L — SIGNIFICANT CHANGE UP (ref 135–145)
SP GR SPEC: 1.02 — SIGNIFICANT CHANGE UP (ref 1–1.03)
SP GR SPEC: 1.02 — SIGNIFICANT CHANGE UP (ref 1–1.03)
T3 SERPL-MCNC: 91 NG/DL
T4 FREE SERPL-MCNC: 1 NG/DL
TSH SERPL-ACNC: 3.8 UIU/ML
UROBILINOGEN FLD QL: 0.2 MG/DL — SIGNIFICANT CHANGE UP (ref 0.2–1)
UROBILINOGEN FLD QL: 0.2 MG/DL — SIGNIFICANT CHANGE UP (ref 0.2–1)
WBC # BLD: 7.67 K/UL — SIGNIFICANT CHANGE UP (ref 3.8–10.5)
WBC # BLD: 7.67 K/UL — SIGNIFICANT CHANGE UP (ref 3.8–10.5)
WBC # FLD AUTO: 7.67 K/UL — SIGNIFICANT CHANGE UP (ref 3.8–10.5)
WBC # FLD AUTO: 7.67 K/UL — SIGNIFICANT CHANGE UP (ref 3.8–10.5)

## 2023-12-19 PROCEDURE — 99223 1ST HOSP IP/OBS HIGH 75: CPT

## 2023-12-19 PROCEDURE — 95720 EEG PHY/QHP EA INCR W/VEEG: CPT

## 2023-12-19 RX ORDER — ESCITALOPRAM OXALATE 10 MG/1
5 TABLET, FILM COATED ORAL DAILY
Refills: 0 | Status: DISCONTINUED | OUTPATIENT
Start: 2023-12-19 | End: 2023-12-23

## 2023-12-19 RX ORDER — ESCITALOPRAM OXALATE 10 MG/1
1 TABLET, FILM COATED ORAL
Refills: 0 | DISCHARGE

## 2023-12-19 RX ORDER — LEVOTHYROXINE SODIUM 125 MCG
1 TABLET ORAL
Refills: 0 | DISCHARGE

## 2023-12-19 RX ORDER — ENOXAPARIN SODIUM 100 MG/ML
40 INJECTION SUBCUTANEOUS EVERY 24 HOURS
Refills: 0 | Status: DISCONTINUED | OUTPATIENT
Start: 2023-12-19 | End: 2023-12-23

## 2023-12-19 RX ORDER — LACOSAMIDE 50 MG/1
200 TABLET ORAL ONCE
Refills: 0 | Status: DISCONTINUED | OUTPATIENT
Start: 2023-12-19 | End: 2023-12-23

## 2023-12-19 NOTE — H&P ADULT - ASSESSMENT
Impression:  Right arm numbness traveling to face followed by R arm and face paralysis. Same symptoms sometimes occurring in L side. Occasional jolts of the whole body. R/o seizure     Plan:   - Pending record request & receipt from prior [facility] hospitalization    [] Lorazepam 2mg IV PRN for seizure activity lasting >3-5mins, >2x/hr, >3x/day, or if GTC , repeat after 1 minute (max dose 0.1 mg/kg)   [] Vimpat  mg is seizures resistant to Ativan  [] video 24hr EEG  [] Continue home Lexapro 5 mg daily    [] neuro checks, NPO until swallow evaluation, seizure, fall & aspiration precautions  [] DVT ppx: Lovenox  [] Given concern for seizure, advise patient to not drive, operate heavy machinery, avoid heights, pools, bathtubs, locked doors until cleared by further follow up outpatient by neurology.   [] Please note: if patient has a convulsion, please document length of episode, specifically what patient was doing paying attention to eye opening vs closure, gaze deviation, shaking of extremities, tongue bite, urinary incontinence, any derangement of vital signs. Generalized motor seizures can have dilated and unreactive pupils, absent oculocephalic reflexes (no dolls eyes), and open eyelids (99% of cases). Head turning from sided to side, pelvic thrusting, bicycling movements, hand waving are NOT manifestations of generalized motor seizures.    Case discussed with Dr. Licnoln. Recommendations finalized once signed by attending.  Impression:  Right arm numbness traveling to face followed by R arm and face paralysis. Same symptoms sometimes occurring in L side. Occasional jolts of the whole body. R/o seizure     Plan:   - Pending record request & receipt from prior [facility] hospitalization    [] Lorazepam 2mg IV PRN for seizure activity lasting >3-5mins, >2x/hr, >3x/day, or if GTC , repeat after 1 minute (max dose 0.1 mg/kg)   [] Vimpat  mg is seizures resistant to Ativan  [] video 24hr EEG  [] Continue home Lexapro 5 mg daily    [] neuro checks, NPO until swallow evaluation, seizure, fall & aspiration precautions  [] DVT ppx: Lovenox  [] Given concern for seizure, advise patient to not drive, operate heavy machinery, avoid heights, pools, bathtubs, locked doors until cleared by further follow up outpatient by neurology.   [] Please note: if patient has a convulsion, please document length of episode, specifically what patient was doing paying attention to eye opening vs closure, gaze deviation, shaking of extremities, tongue bite, urinary incontinence, any derangement of vital signs. Generalized motor seizures can have dilated and unreactive pupils, absent oculocephalic reflexes (no dolls eyes), and open eyelids (99% of cases). Head turning from sided to side, pelvic thrusting, bicycling movements, hand waving are NOT manifestations of generalized motor seizures.    Case discussed with Dr. Lincoln. Recommendations finalized once signed by attending.

## 2023-12-19 NOTE — H&P ADULT - HISTORY OF PRESENT ILLNESS
38 year old right handed woman with a PMH of Hashimoto's and hemochromatosis who presents to the EMU for elective admission for event capture.    On 10/16/23, she was feeling unwell, run down and off balance. She had an episode where she felt her ears were popping, dizzy, and lightheaded. Her eyes were sensitive to light. These symptoms came on rapidly at the time after which jd developed paralysis from her right hand up to the arm and face, with slurred speech, followed by issues formulating thoughts. She went to [facility] where she was discovered to have viral meningitis. She was admitted and treated with antiviral and sent home.  ?  After discharge patient has had many similar episodes of paralysis from her right hand up to the arm and face. When the episodes are severe they effect her speech. She presented to [facility] twice for these episodes and workup was unremarkable. At that time the migraine cocktail helped. She was also admitted to [facility] with severe headaches and readmitted a few days later with chest pain, thought to be from the Sumatriptan. Patient reports she has not had a headache in over a week. She is no longer taking migraine medications. She has no prior hx of migraines.?  ?  She continues to experience recurrent episodes of paralysis; can be on either side and can be on both sides. The episodes can range from 10 minutes to 2.5 hours; sometimes it just affects her hand/arm and other times it can be the whole side of her body. When it comes on it's a tingling sensation that she can feel spreading; within minutes feels the full paralysis. She has at least one episode a day; most frequently at night time. She also occasionally feels weakness on one side of the body. During one episode, she endorsed shaking/convulsions. She describes the shaking as "jolts" in her body during which she retains awareness. Currently patient reports fatigue. She denies current headache, nausea, dizziness, vision changes, hearing changes, focal numbness or focal weakness. 38 year old right handed woman with a PMH of Hashimoto's and hemochromatosis who presents to the EMU for elective admission for event capture.    On 10/16/23, she was feeling unwell, run down and off balance. She had an episode where she felt her ears were popping, dizzy, and lightheaded. Her eyes were sensitive to light. These symptoms came on rapidly at the time after which jd developed paralysis from her right hand up to the arm and face, with slurred speech, followed by issues formulating thoughts. She went to [facility] where she was discovered to have viral meningitis. She was admitted and treated with antiviral and sent home.  ?  After discharge patient has had many similar episodes of paralysis from her right hand up to the arm and face. When the episodes are severe they effect her speech. She presented to [facility] twice for these episodes and workup was unremarkable. At that time the migraine cocktail helped. She was also admitted to [facility] with severe headaches and readmitted a few days later with chest pain, thought to be from the Sumatriptan. Patient reports she has not had a headache in over a week. She is no longer taking migraine medications. She has no prior hx of migraines.?  ?  She continues to experience recurrent episodes of paralysis; can be on either side and can be on both sides. The episodes can range from 10 minutes to 2.5 hours; sometimes it just affects her hand/arm and other times it can be the whole side of her body. When it comes on it's a tingling sensation that she can feel spreading; within minutes feels the full paralysis. She has at least one episode a day; most frequently at night time. She also occasionally feels weakness on one side of the body. During one episode, she endorsed shaking/convulsions. She describes the shaking as "jolts" in her body during which she retains awareness. Currently patient reports fatigue. She denies current headache, nausea, dizziness, vision changes, hearing changes, focal numbness or focal weakness.

## 2023-12-19 NOTE — PATIENT PROFILE ADULT - FALL HARM RISK - HARM RISK INTERVENTIONS
Assistance OOB with selected safe patient handling equipment/Communicate Risk of Fall with Harm to all staff/Discuss with provider need for PT consult/Monitor gait and stability/Provide patient with walking aids - walker, cane, crutches/Reinforce activity limits and safety measures with patient and family/Tailored Fall Risk Interventions/Visual Cue: Yellow wristband and red socks/Bed in lowest position, wheels locked, appropriate side rails in place/Call bell, personal items and telephone in reach/Instruct patient to call for assistance before getting out of bed or chair/Non-slip footwear when patient is out of bed/[place] to call system/Physically safe environment - no spills, clutter or unnecessary equipment/Purposeful Proactive Rounding/Room/bathroom lighting operational, light cord in reach Assistance OOB with selected safe patient handling equipment/Communicate Risk of Fall with Harm to all staff/Discuss with provider need for PT consult/Monitor gait and stability/Provide patient with walking aids - walker, cane, crutches/Reinforce activity limits and safety measures with patient and family/Tailored Fall Risk Interventions/Visual Cue: Yellow wristband and red socks/Bed in lowest position, wheels locked, appropriate side rails in place/Call bell, personal items and telephone in reach/Instruct patient to call for assistance before getting out of bed or chair/Non-slip footwear when patient is out of bed/Brothers to call system/Physically safe environment - no spills, clutter or unnecessary equipment/Purposeful Proactive Rounding/Room/bathroom lighting operational, light cord in reach

## 2023-12-19 NOTE — H&P ADULT - NSHPPHYSICALEXAM_GEN_ALL_CORE
General:  Constitutional: Female, appears stated age, nontoxic, not in distress,    Head: Normocephalic;   Eyes: clear sclera;   Extremities: No cyanosis;   Resp: breathing comfortably     Neurological (>12):  MS: Awake, alert.  Oriented person place situation. Follows commands. Attends to examiner  Language: Speech is hypophonic, clear, fluent, good repetition,  comprehension, registration of words.  CNs: PERRL (R 3mm, L 3mm). VFF. EOMI. No disconjugate gaze, skew. V1-3 intact LT, No facial asymmetry b/l. Hearing grossly normal b/l. Tongue midline.     Motor - Normal bulk and tone throughout. No pronator drift.    L/R (out of 5)       Deltoid  5/5    Biceps   5/5      Triceps  5/5         Wrist Extension 5/5   Wrist Flexion  5/5   Interossei 5/5     5/5   L/R (out of 5)       Hip Flexion  5/5    Hip Extension  5/5  Knee Extension  5/5  Dorsiflexion  5/5      Plantar Flexion 5/5     Sensation: Intact to LT b/l. Cortical: Extinction on DSS (neglect): none  Reflexes L/R:  Biceps(C5) 2/2  BR(C6) 2/2   Triceps(C7)  2/2 Patellar(L4)   2/2   Toes: mute  Coordination: No dysmetria to FTN b/l UE  Gait: Normal Romberg. No postural instability. Normal stance.

## 2023-12-20 ENCOUNTER — TRANSCRIPTION ENCOUNTER (OUTPATIENT)
Age: 38
End: 2023-12-20

## 2023-12-20 LAB
ANION GAP SERPL CALC-SCNC: 8 MMOL/L — SIGNIFICANT CHANGE UP (ref 5–17)
ANION GAP SERPL CALC-SCNC: 8 MMOL/L — SIGNIFICANT CHANGE UP (ref 5–17)
APPEARANCE CSF: CLEAR — SIGNIFICANT CHANGE UP
APPEARANCE CSF: CLEAR — SIGNIFICANT CHANGE UP
BUN SERPL-MCNC: 11 MG/DL — SIGNIFICANT CHANGE UP (ref 7–23)
BUN SERPL-MCNC: 11 MG/DL — SIGNIFICANT CHANGE UP (ref 7–23)
CALCIUM SERPL-MCNC: 8.8 MG/DL — SIGNIFICANT CHANGE UP (ref 8.4–10.5)
CALCIUM SERPL-MCNC: 8.8 MG/DL — SIGNIFICANT CHANGE UP (ref 8.4–10.5)
CHLORIDE SERPL-SCNC: 105 MMOL/L — SIGNIFICANT CHANGE UP (ref 96–108)
CHLORIDE SERPL-SCNC: 105 MMOL/L — SIGNIFICANT CHANGE UP (ref 96–108)
CO2 SERPL-SCNC: 25 MMOL/L — SIGNIFICANT CHANGE UP (ref 22–31)
CO2 SERPL-SCNC: 25 MMOL/L — SIGNIFICANT CHANGE UP (ref 22–31)
COLOR CSF: SIGNIFICANT CHANGE UP
COLOR CSF: SIGNIFICANT CHANGE UP
CREAT SERPL-MCNC: 0.79 MG/DL — SIGNIFICANT CHANGE UP (ref 0.5–1.3)
CREAT SERPL-MCNC: 0.79 MG/DL — SIGNIFICANT CHANGE UP (ref 0.5–1.3)
EGFR: 98 ML/MIN/1.73M2 — SIGNIFICANT CHANGE UP
EGFR: 98 ML/MIN/1.73M2 — SIGNIFICANT CHANGE UP
GLUCOSE CSF-MCNC: 70 MG/DL — SIGNIFICANT CHANGE UP (ref 40–70)
GLUCOSE CSF-MCNC: 70 MG/DL — SIGNIFICANT CHANGE UP (ref 40–70)
GLUCOSE SERPL-MCNC: 106 MG/DL — HIGH (ref 70–99)
GLUCOSE SERPL-MCNC: 106 MG/DL — HIGH (ref 70–99)
GLUCOSE SERPL-MCNC: 99 MG/DL — SIGNIFICANT CHANGE UP (ref 70–99)
GLUCOSE SERPL-MCNC: 99 MG/DL — SIGNIFICANT CHANGE UP (ref 70–99)
HCT VFR BLD CALC: 35.1 % — SIGNIFICANT CHANGE UP (ref 34.5–45)
HCT VFR BLD CALC: 35.1 % — SIGNIFICANT CHANGE UP (ref 34.5–45)
HGB BLD-MCNC: 11.9 G/DL — SIGNIFICANT CHANGE UP (ref 11.5–15.5)
HGB BLD-MCNC: 11.9 G/DL — SIGNIFICANT CHANGE UP (ref 11.5–15.5)
LDH CSF L TO P-CCNC: <15 U/L — SIGNIFICANT CHANGE UP
LDH CSF L TO P-CCNC: <15 U/L — SIGNIFICANT CHANGE UP
LDH FLD-CCNC: <15 U/L — SIGNIFICANT CHANGE UP
LDH FLD-CCNC: <15 U/L — SIGNIFICANT CHANGE UP
LYMPHOCYTES # CSF: 94 % — HIGH (ref 40–80)
LYMPHOCYTES # CSF: 94 % — HIGH (ref 40–80)
MCHC RBC-ENTMCNC: 30.7 PG — SIGNIFICANT CHANGE UP (ref 27–34)
MCHC RBC-ENTMCNC: 30.7 PG — SIGNIFICANT CHANGE UP (ref 27–34)
MCHC RBC-ENTMCNC: 33.9 GM/DL — SIGNIFICANT CHANGE UP (ref 32–36)
MCHC RBC-ENTMCNC: 33.9 GM/DL — SIGNIFICANT CHANGE UP (ref 32–36)
MCV RBC AUTO: 90.5 FL — SIGNIFICANT CHANGE UP (ref 80–100)
MCV RBC AUTO: 90.5 FL — SIGNIFICANT CHANGE UP (ref 80–100)
MONOS+MACROS NFR CSF: 6 % — LOW (ref 15–45)
MONOS+MACROS NFR CSF: 6 % — LOW (ref 15–45)
NEUTROPHILS # CSF: SIGNIFICANT CHANGE UP (ref 0–6)
NEUTROPHILS # CSF: SIGNIFICANT CHANGE UP (ref 0–6)
NRBC # BLD: 0 /100 WBCS — SIGNIFICANT CHANGE UP (ref 0–0)
NRBC # BLD: 0 /100 WBCS — SIGNIFICANT CHANGE UP (ref 0–0)
NRBC NFR CSF: 11 /UL — HIGH (ref 0–5)
NRBC NFR CSF: 11 /UL — HIGH (ref 0–5)
PLATELET # BLD AUTO: 255 K/UL — SIGNIFICANT CHANGE UP (ref 150–400)
PLATELET # BLD AUTO: 255 K/UL — SIGNIFICANT CHANGE UP (ref 150–400)
POTASSIUM SERPL-MCNC: 3.9 MMOL/L — SIGNIFICANT CHANGE UP (ref 3.5–5.3)
POTASSIUM SERPL-MCNC: 3.9 MMOL/L — SIGNIFICANT CHANGE UP (ref 3.5–5.3)
POTASSIUM SERPL-SCNC: 3.9 MMOL/L — SIGNIFICANT CHANGE UP (ref 3.5–5.3)
POTASSIUM SERPL-SCNC: 3.9 MMOL/L — SIGNIFICANT CHANGE UP (ref 3.5–5.3)
PROT CSF-MCNC: 31 MG/DL — SIGNIFICANT CHANGE UP (ref 15–45)
PROT CSF-MCNC: 31 MG/DL — SIGNIFICANT CHANGE UP (ref 15–45)
RBC # BLD: 3.88 M/UL — SIGNIFICANT CHANGE UP (ref 3.8–5.2)
RBC # BLD: 3.88 M/UL — SIGNIFICANT CHANGE UP (ref 3.8–5.2)
RBC # CSF: 7 /UL — HIGH (ref 0–0)
RBC # CSF: 7 /UL — HIGH (ref 0–0)
RBC # FLD: 12.3 % — SIGNIFICANT CHANGE UP (ref 10.3–14.5)
RBC # FLD: 12.3 % — SIGNIFICANT CHANGE UP (ref 10.3–14.5)
SODIUM SERPL-SCNC: 138 MMOL/L — SIGNIFICANT CHANGE UP (ref 135–145)
SODIUM SERPL-SCNC: 138 MMOL/L — SIGNIFICANT CHANGE UP (ref 135–145)
TUBE TYPE: SIGNIFICANT CHANGE UP
TUBE TYPE: SIGNIFICANT CHANGE UP
WBC # BLD: 5.68 K/UL — SIGNIFICANT CHANGE UP (ref 3.8–10.5)
WBC # BLD: 5.68 K/UL — SIGNIFICANT CHANGE UP (ref 3.8–10.5)
WBC # FLD AUTO: 5.68 K/UL — SIGNIFICANT CHANGE UP (ref 3.8–10.5)
WBC # FLD AUTO: 5.68 K/UL — SIGNIFICANT CHANGE UP (ref 3.8–10.5)

## 2023-12-20 PROCEDURE — 95720 EEG PHY/QHP EA INCR W/VEEG: CPT

## 2023-12-20 PROCEDURE — 99232 SBSQ HOSP IP/OBS MODERATE 35: CPT

## 2023-12-20 RX ORDER — LACOSAMIDE 50 MG/1
1 TABLET ORAL
Qty: 0 | Refills: 0 | DISCHARGE
Start: 2023-12-20

## 2023-12-20 RX ORDER — ACETAMINOPHEN 500 MG
650 TABLET ORAL EVERY 6 HOURS
Refills: 0 | Status: DISCONTINUED | OUTPATIENT
Start: 2023-12-20 | End: 2023-12-23

## 2023-12-20 RX ADMIN — Medication 650 MILLIGRAM(S): at 18:51

## 2023-12-20 RX ADMIN — ESCITALOPRAM OXALATE 5 MILLIGRAM(S): 10 TABLET, FILM COATED ORAL at 11:03

## 2023-12-20 RX ADMIN — Medication 650 MILLIGRAM(S): at 19:30

## 2023-12-20 NOTE — DISCHARGE NOTE PROVIDER - CARE PROVIDER_API CALL
Shandra Lincoln  Neurology  [address], [address]  [place] [address]  Phone: [phone]  Fax: [phone]  Follow Up Time:    Shandra Lincoln  Neurology  [address], [address]  [place] [address]  Phone: [phone]  Fax: [phone]  Follow Up Time:

## 2023-12-20 NOTE — EEG REPORT - NS EEG TEXT BOX
DAY 1 	START: 12/19/2023  16:55:31 PM     	END: 12/20/2023  08:00  	DURATION: 14 hr 33 min    DAILY EEG VISUAL ANALYSIS    The background was continuous, symmetric, spontaneously variable. During wakefulness, the posterior dominant rhythm consisted of symmetric, well-modulated 11-Hz activity that attenuated to eye opening.  Low amplitude frontal beta.    BACKGROUND SLOWING:  No generalized background slowing was present.    FOCAL SLOWING:   Rare left temporal focal polymorphic delta slowing.    SLEEP BACKGROUND:  Drowsiness was characterized by fragmentation, attenuation, and slowing of the background activity.    Stage 2 sleep was characterized by the presence of symmetric vertex waves, sleep spindles and K-complexes.    OTHER NON-EPILEPTIFORM FINDINGS:  None were present.    ACTIVATION PROCEDURES:   Hyperventilation was not performed.    Photic stimulation was not performed.    INTERICTAL EPILEPTIFORM ACTIVITY:   None    EVENTS:  No events or seizures recorded.    ARTIFACTS:  Intermittent myogenic and movement artifacts were noted. Near continuous P3 electrode artifact.    Single-lead EKG: Regular rhythm    ASMs: None     -------------------------------------------------------------------------------------------------------------------------------------------------------  EEG SUMMARY:  Abnormal EEG in the awake, drowsy and asleep states.  Rare left temporal focal slowing.  No epileptiform pattern or seizures were recorded.    -------------------------------------------------------------------------------------------------------------------------------------------------------  IMPRESSION/CLINICAL CORRELATE:  Mild left temporal focal cerebral dysfunction can be structural or functional in etiology.    -------------------------------------------------------------------------------------------------------------------------------------------------------  Elham Paz MD  Fellow, [facility]    Salome Shepherd MD  Attending Physician, [facility]    ------------------------------------  EEG Reading Room: [phone]  On Call Service After Hours: [phone]   DAY 1 	START: 12/19/2023  16:55:31 PM     	END: 12/20/2023  08:00  	DURATION: 14 hr 33 min    DAILY EEG VISUAL ANALYSIS    The background was continuous, symmetric, spontaneously variable. During wakefulness, the posterior dominant rhythm consisted of symmetric, well-modulated 11-Hz activity that attenuated to eye opening.  Low amplitude frontal beta.    BACKGROUND SLOWING:  No generalized background slowing was present.    FOCAL SLOWING:   Rare left temporal focal polymorphic delta slowing.    SLEEP BACKGROUND:  Drowsiness was characterized by fragmentation, attenuation, and slowing of the background activity.    Stage 2 sleep was characterized by the presence of symmetric vertex waves, sleep spindles and K-complexes.    OTHER NON-EPILEPTIFORM FINDINGS:  None were present.    ACTIVATION PROCEDURES:   Hyperventilation was not performed.    Photic stimulation was not performed.    INTERICTAL EPILEPTIFORM ACTIVITY:   None    EVENTS:  No events or seizures recorded.    ARTIFACTS:  Intermittent myogenic and movement artifacts were noted. Near continuous P3 electrode artifact.    Single-lead EKG: Regular rhythm    ASMs: None     -------------------------------------------------------------------------------------------------------------------------------------------------------  EEG SUMMARY:  Abnormal EEG in the awake, drowsy and asleep states.  Rare left temporal focal slowing.  No epileptiform pattern or seizures were recorded.    -------------------------------------------------------------------------------------------------------------------------------------------------------  IMPRESSION/CLINICAL CORRELATE:  Mild left temporal focal cerebral dysfunction can be structural or functional in etiology.    -------------------------------------------------------------------------------------------------------------------------------------------------------  Elham Paz MD  Fellow, [facility]    Salome Shepherd MD  Attending Physician, [facility]    ------------------------------------  EEG Reading Room: [phone]  On Call Service After Hours: [phone]

## 2023-12-20 NOTE — DISCHARGE NOTE PROVIDER - HOSPITAL COURSE
Ms. Malhotra is a 38 year old right handed woman with a PMH of Hashimoto's and hemochromatosis who presented to the [facility] EMU for elective admission for event capture, referred by outpatient neurologist Dr. Lincoln.    On 10/16/23, patient started experiencing gait inbalance, fatigue, dizziness, lightheadedness, photophobia, acutely. Shortly afterwards patient started experiencing right sided paralysis, from the right hand extending up to her right arm along with numbness and tingling in the face and dysarthria, AMS. She went to [facility] where she was found to have viral meningitis. She was admitted and treated with antiviral and sent home.  ?  After discharge patient has had many similar episodes of paralysis from her right hand up to the arm and face. When the episodes are severe they effect her speech. She presented to [facility] twice for these episodes and workup was unremarkable. At that time the migraine cocktail helped. She was also admitted to [facility] with severe headaches and readmitted a few days later with chest pain which was attributed to side effects of sumatriptan.   ?  She continues to experience recurrent episodes of paralysis ranging from 10 minutes to 2.5 hours, sometimes solely in the hand/arm, other times in the entire right side of her body. Patient had daily episodes, usually at night. She describes the shaking as "jolts" in her body during which she retains awareness.    During her EMU admission at [facility] she had video EEG done. Below are the results:    She also had a lumbar puncture with CSF studies to work up for possible autoimmune encephalitis/autoimmune process/MS.      Ms. Malhotra is a 38 year old right handed woman with a PMH of Hashimoto's and hemochromatosis who presented to the [facility] EMU for elective admission for event capture, referred by outpatient neurologist Dr. Lincoln.    On 10/16/23, patient started experiencing gait inbalance, fatigue, dizziness, lightheadedness, photophobia, acutely. Shortly afterwards patient started experiencing right sided paralysis, from the right hand extending up to her right arm along with numbness and tingling in the face and dysarthria, AMS. She went to [facility] where she was found to have viral meningitis. She was admitted and treated with antiviral and sent home.  ?  After discharge patient has had many similar episodes of paralysis from her right hand up to the arm and face. When the episodes are severe they effect her speech. She presented to [facility] twice for these episodes and workup was unremarkable. At that time the migraine cocktail helped. She was also admitted to [facility] with severe headaches and readmitted a few days later with chest pain which was attributed to side effects of sumatriptan.   ?  She continues to experience recurrent episodes of paralysis ranging from 10 minutes to 2.5 hours, sometimes solely in the hand/arm, other times in the entire right side of her body. Patient had daily episodes, usually at night. She describes the shaking as "jolts" in her body during which she retains awareness.    During her EMU admission at [facility] she had video EEG done. Below are the results:    She also had a lumbar puncture with CSF studies to work up for possible autoimmune encephalitis/autoimmune process/MS.      Ms. Malhotra is a 38 year old right handed woman with a PMH of Hashimoto's and hemochromatosis who presented to the [facility] EMU for elective admission for event capture, referred by outpatient neurologist Dr. Lincoln.    On 10/16/23, patient started experiencing gait inbalance, fatigue, dizziness, lightheadedness, photophobia, acutely. Shortly afterwards patient started experiencing right sided paralysis, from the right hand extending up to her right arm along with numbness and tingling in the face and dysarthria, AMS. She went to [facility] where she was found to have viral meningitis. She was admitted and treated with antiviral and sent home.  ?  After discharge patient has had many similar episodes of paralysis from her right hand up to the arm and face. When the episodes are severe they effect her speech. She presented to [facility] twice for these episodes and workup was unremarkable. At that time the migraine cocktail helped. She was also admitted to [facility] with severe headaches and readmitted a few days later with chest pain which was attributed to side effects of sumatriptan.   ?  She continues to experience recurrent episodes of paralysis ranging from 10 minutes to 2.5 hours, sometimes solely in the hand/arm, other times in the entire right side of her body. Patient had daily episodes, usually at night. She describes the shaking as "jolts" in her body during which she retains awareness.    During her EMU admission at [facility] she had video EEG done. Below are the results:    · EEG Report	  DAY 1 	START: 12/19/2023  16:55:31 PM     	END: 12/20/2023  08:00  	DURATION: 14 hr 33 min    DAILY EEG VISUAL ANALYSIS    The background was continuous, symmetric, spontaneously variable. During wakefulness, the posterior dominant rhythm consisted of symmetric, well-modulated 11-Hz activity that attenuated to eye opening.  Low amplitude frontal beta.    BACKGROUND SLOWING:  No generalized background slowing was present.    FOCAL SLOWING:   Rare left temporal focal polymorphic delta slowing.    SLEEP BACKGROUND:  Drowsiness was characterized by fragmentation, attenuation, and slowing of the background activity.    Stage 2 sleep was characterized by the presence of symmetric vertex waves, sleep spindles and K-complexes.    OTHER NON-EPILEPTIFORM FINDINGS:  None were present.    ACTIVATION PROCEDURES:   Hyperventilation was not performed.    Photic stimulation was not performed.    INTERICTAL EPILEPTIFORM ACTIVITY:   None    EVENTS:  No events or seizures recorded.    ARTIFACTS:  Intermittent myogenic and movement artifacts were noted. Near continuous P3 electrode artifact.    Single-lead EKG: Regular rhythm    ASMs: None     -------------------------------------------------------------------------------------------------------------------------------------------------------  EEG SUMMARY:  Abnormal EEG in the awake, drowsy and asleep states.  Rare left temporal focal slowing.  No epileptiform pattern or seizures were recorded.    -------------------------------------------------------------------------------------------------------------------------------------------------------  IMPRESSION/CLINICAL CORRELATE:  Mild left temporal focal cerebral dysfunction can be structural or functional in etiology.      -----  Patient was not found to have any clinical signs of seizures witnessed during her hospital course here.  She also had a lumbar puncture with CSF studies to work up for possible autoimmune encephalitis/autoimmune process/MS. Results may take a few weeks to return, she should follow up with outpatient neurologist to go over results when they are back.      Ms. Malhotra is a 38 year old right handed woman with a PMH of Hashimoto's and hemochromatosis who presented to the [facility] EMU for elective admission for event capture, referred by outpatient neurologist Dr. Lincoln.    On 10/16/23, patient started experiencing gait inbalance, fatigue, dizziness, lightheadedness, photophobia, acutely. Shortly afterwards patient started experiencing right sided paralysis, from the right hand extending up to her right arm along with numbness and tingling in the face and dysarthria, AMS. She went to [facility] where she was found to have viral meningitis. She was admitted and treated with antiviral and sent home.  ?  After discharge patient has had many similar episodes of paralysis from her right hand up to the arm and face. When the episodes are severe they effect her speech. She presented to [facility] twice for these episodes and workup was unremarkable. At that time the migraine cocktail helped. She was also admitted to [facility] with severe headaches and readmitted a few days later with chest pain which was attributed to side effects of sumatriptan.   ?  She continues to experience recurrent episodes of paralysis ranging from 10 minutes to 2.5 hours, sometimes solely in the hand/arm, other times in the entire right side of her body. Patient had daily episodes, usually at night. She describes the shaking as "jolts" in her body during which she retains awareness.    During her EMU admission at [facility] she had video EEG done. Below are the results:    · EEG Report	  DAY 1 	START: 12/19/2023  16:55:31 PM     	END: 12/20/2023  08:00  	DURATION: 14 hr 33 min    DAILY EEG VISUAL ANALYSIS    The background was continuous, symmetric, spontaneously variable. During wakefulness, the posterior dominant rhythm consisted of symmetric, well-modulated 11-Hz activity that attenuated to eye opening.  Low amplitude frontal beta.    BACKGROUND SLOWING:  No generalized background slowing was present.    FOCAL SLOWING:   Rare left temporal focal polymorphic delta slowing.    SLEEP BACKGROUND:  Drowsiness was characterized by fragmentation, attenuation, and slowing of the background activity.    Stage 2 sleep was characterized by the presence of symmetric vertex waves, sleep spindles and K-complexes.    OTHER NON-EPILEPTIFORM FINDINGS:  None were present.    ACTIVATION PROCEDURES:   Hyperventilation was not performed.    Photic stimulation was not performed.    INTERICTAL EPILEPTIFORM ACTIVITY:   None    EVENTS:  No events or seizures recorded.    ARTIFACTS:  Intermittent myogenic and movement artifacts were noted. Near continuous P3 electrode artifact.    Single-lead EKG: Regular rhythm    ASMs: None     -------------------------------------------------------------------------------------------------------------------------------------------------------  EEG SUMMARY:  Abnormal EEG in the awake, drowsy and asleep states.  Rare left temporal focal slowing.  No epileptiform pattern or seizures were recorded.    -------------------------------------------------------------------------------------------------------------------------------------------------------  IMPRESSION/CLINICAL CORRELATE:  Mild left temporal focal cerebral dysfunction can be structural or functional in etiology.      -----  Patient was not found to have any clinical signs of seizures witnessed during her hospital course here.  She also had a lumbar puncture with CSF studies to work up for possible autoimmune encephalitis/autoimmune process/MS. Results may take a few weeks to return, she should follow up with outpatient neurologist to go over results when they are back.      Ms. Malhotra is a 38 year old right handed woman with a PMH of Hashimoto's and hemochromatosis who presented to the [facility] EMU for elective admission for event capture, referred by outpatient neurologist Dr. Lincoln.    On 10/16/23, patient started experiencing gait inbalance, fatigue, dizziness, lightheadedness, photophobia, acutely. Shortly afterwards patient started experiencing right sided paralysis, from the right hand extending up to her right arm along with numbness and tingling in the face and dysarthria, AMS. She went to [facility] where she was found to have viral meningitis. She was admitted and treated with antiviral and sent home.  ?  After discharge patient has had many similar episodes of paralysis from her right hand up to the arm and face. When the episodes are severe they effect her speech. She presented to [facility] twice for these episodes and workup was unremarkable. At that time the migraine cocktail helped. She was also admitted to [facility] with severe headaches and readmitted a few days later with chest pain which was attributed to side effects of sumatriptan.   ?  She continues to experience recurrent episodes of paralysis ranging from 10 minutes to 2.5 hours, sometimes solely in the hand/arm, other times in the entire right side of her body. Patient had daily episodes, usually at night. She describes the shaking as "jolts" in her body during which she retains awareness.    During her EMU admission at [facility] she had video EEG done. Below are the results:    · EEG Report	  DAY 1 	START: 12/19/2023  16:55:31 PM     	END: 12/20/2023  08:00  	DURATION: 14 hr 33 min    DAILY EEG VISUAL ANALYSIS    The background was continuous, symmetric, spontaneously variable. During wakefulness, the posterior dominant rhythm consisted of symmetric, well-modulated 11-Hz activity that attenuated to eye opening.  Low amplitude frontal beta.    BACKGROUND SLOWING:  No generalized background slowing was present.    FOCAL SLOWING:   Rare left temporal focal polymorphic delta slowing.    SLEEP BACKGROUND:  Drowsiness was characterized by fragmentation, attenuation, and slowing of the background activity.    Stage 2 sleep was characterized by the presence of symmetric vertex waves, sleep spindles and K-complexes.    OTHER NON-EPILEPTIFORM FINDINGS:  None were present.    ACTIVATION PROCEDURES:   Hyperventilation was not performed.    Photic stimulation was not performed.    INTERICTAL EPILEPTIFORM ACTIVITY:   None    EVENTS:  No events or seizures recorded.    ARTIFACTS:  Intermittent myogenic and movement artifacts were noted. Near continuous P3 electrode artifact.    Single-lead EKG: Regular rhythm    ASMs: None     -------------------------------------------------------------------------------------------------------------------------------------------------------  EEG SUMMARY:  Abnormal EEG in the awake, drowsy and asleep states.  Rare left temporal focal slowing.  No epileptiform pattern or seizures were recorded.    -------------------------------------------------------------------------------------------------------------------------------------------------------  IMPRESSION/CLINICAL CORRELATE:  Mild left temporal focal cerebral dysfunction can be structural or functional in etiology.      -----  Patient was not found to have any clinical signs of seizures witnessed during her hospital course here.  She also had a lumbar puncture with CSF studies to work up for possible autoimmune encephalitis/autoimmune process/MS. Results may take a few weeks to return, she should follow up with outpatient neurologist to go over results when they are back.     The patient underwent a 3 day course of IVIG - was discharged home with instructions to follow up closely with epilepsy physician Dr. Latonia van.    Ms. Malhotra is a 38 year old right handed woman with a PMH of Hashimoto's and hemochromatosis who presented to the [facility] EMU for elective admission for event capture, referred by outpatient neurologist Dr. Lincoln.    On 10/16/23, patient started experiencing gait inbalance, fatigue, dizziness, lightheadedness, photophobia, acutely. Shortly afterwards patient started experiencing right sided paralysis, from the right hand extending up to her right arm along with numbness and tingling in the face and dysarthria, AMS. She went to [place] where she was found to have viral meningitis. She was admitted and treated with antiviral and sent home.  ?  After discharge patient has had many similar episodes of paralysis from her right hand up to the arm and face. When the episodes are severe they effect her speech. She presented to [facility] twice for these episodes and workup was unremarkable. At that time the migraine cocktail helped. She was also admitted to [facility] with severe headaches and readmitted a few days later with chest pain which was attributed to side effects of sumatriptan.   ?  She continues to experience recurrent episodes of paralysis ranging from 10 minutes to 2.5 hours, sometimes solely in the hand/arm, other times in the entire right side of her body. Patient had daily episodes, usually at night. She describes the shaking as "jolts" in her body during which she retains awareness.    During her EMU admission at [facility] she had video EEG done. Below are the results:    · EEG Report	  DAY 1 	START: 12/19/2023  16:55:31 PM     	END: 12/20/2023  08:00  	DURATION: 14 hr 33 min    DAILY EEG VISUAL ANALYSIS    The background was continuous, symmetric, spontaneously variable. During wakefulness, the posterior dominant rhythm consisted of symmetric, well-modulated 11-Hz activity that attenuated to eye opening.  Low amplitude frontal beta.    BACKGROUND SLOWING:  No generalized background slowing was present.    FOCAL SLOWING:   Rare left temporal focal polymorphic delta slowing.    SLEEP BACKGROUND:  Drowsiness was characterized by fragmentation, attenuation, and slowing of the background activity.    Stage 2 sleep was characterized by the presence of symmetric vertex waves, sleep spindles and K-complexes.    OTHER NON-EPILEPTIFORM FINDINGS:  None were present.    ACTIVATION PROCEDURES:   Hyperventilation was not performed.    Photic stimulation was not performed.    INTERICTAL EPILEPTIFORM ACTIVITY:   None    EVENTS:  No events or seizures recorded.    ARTIFACTS:  Intermittent myogenic and movement artifacts were noted. Near continuous P3 electrode artifact.    Single-lead EKG: Regular rhythm    ASMs: None     -------------------------------------------------------------------------------------------------------------------------------------------------------  EEG SUMMARY:  Abnormal EEG in the awake, drowsy and asleep states.  Rare left temporal focal slowing.  No epileptiform pattern or seizures were recorded.    -------------------------------------------------------------------------------------------------------------------------------------------------------  IMPRESSION/CLINICAL CORRELATE:  Mild left temporal focal cerebral dysfunction can be structural or functional in etiology.      -----  Patient was not found to have any clinical signs of seizures witnessed during her hospital course here.  She also had a lumbar puncture with CSF studies to work up for possible autoimmune encephalitis/autoimmune process/MS. Results may take a few weeks to return, she should follow up with outpatient neurologist to go over results when they are back.     The patient underwent a 3 day course of IVIG - was discharged home with instructions to follow up closely with epilepsy physician Dr. Latonia van.

## 2023-12-20 NOTE — DISCHARGE NOTE PROVIDER - CARE PROVIDERS DIRECT ADDRESSES
[email or web address]

## 2023-12-20 NOTE — DISCHARGE NOTE PROVIDER - NSDCMRMEDTOKEN_GEN_ALL_CORE_FT
Lexapro 5 mg oral tablet: 1 tab(s) orally once a day   Lexapro 5 mg oral tablet: 1 tab(s) orally once a day  Vimpat 200 mg oral tablet: 1 tab(s) orally once As needed Seizure resistant to Ativan

## 2023-12-20 NOTE — DISCHARGE NOTE PROVIDER - NSDCFUSCHEDAPPT_GEN_ALL_CORE_FT
[facility]  NEUROLOGY [address] [place] [address]  Scheduled Appointment: 01/29/2024     [facility]  NEUROLOGY [address] [place] [address]  Scheduled Appointment: 01/29/2024

## 2023-12-20 NOTE — DISCHARGE NOTE PROVIDER - NSDCCPCAREPLAN_GEN_ALL_CORE_FT
PRINCIPAL DISCHARGE DIAGNOSIS  Diagnosis: Seizures  Assessment and Plan of Treatment: You came into the hospital as an elective admission for epilepsy monitoring. You were here to see if you had any seizures and if you had any autoimmune encephalitis or disorders such as multiple sclerosis that could lead to these symptoms you were experiencing. You had an EEG to see your brain wave activity. It showed that you are at risk of seizures to a certain extent. Seizures are sudden, uncontrolled electrical disturbances in the brain that can cause changes in behavior, sensation, or consciousness. They may manifest as convulsions, staring spells, or subtle body movements. Seizures can result from various conditions, such as epilepsy or brain injury, and are diagnosed through medical evaluation. Treatment options include medication and lifestyle modifications to manage and reduce the occurrence of seizures. If someone experiences a seizure, it's crucial to seek medical advice for proper diagnosis and appropriate care.     PRINCIPAL DISCHARGE DIAGNOSIS  Diagnosis: Seizures  Assessment and Plan of Treatment: You came into the hospital as an elective admission for epilepsy monitoring. You were here to see if you had any seizures and if you had any autoimmune encephalitis or disorders such as multiple sclerosis that could lead to these symptoms you were experiencing. You had an EEG to see your brain wave activity. It showed that you are at risk of seizures to a certain extent. Seizures are sudden, uncontrolled electrical disturbances in the brain that can cause changes in behavior, sensation, or consciousness. They may manifest as convulsions, staring spells, or subtle body movements. Seizures can result from various conditions, such as epilepsy or brain injury, and are diagnosed through medical evaluation. Treatment options include medication and lifestyle modifications to manage and reduce the occurrence of seizures. If someone experiences a seizure, it's crucial to seek medical advice for proper diagnosis and appropriate care.  You have a history of possible viral meningitis which could have triggered autoimmune encephalitis, which can explain why you are having these seizure like episodes - you have been treated with IVIG, an iimmunosuppressing agent and should follow up with Neurology Dr. Lincoln outpatient in 2-4 weeks.

## 2023-12-21 ENCOUNTER — TRANSCRIPTION ENCOUNTER (OUTPATIENT)
Age: 38
End: 2023-12-21

## 2023-12-21 LAB
ALBUMIN CSF-MCNC: 20 MG/DL — SIGNIFICANT CHANGE UP (ref 14–25)
ALBUMIN CSF-MCNC: 20 MG/DL — SIGNIFICANT CHANGE UP (ref 14–25)
ALBUMIN SERPL ELPH-MCNC: 4360 MG/DL — SIGNIFICANT CHANGE UP (ref 3500–5200)
ALBUMIN SERPL ELPH-MCNC: 4360 MG/DL — SIGNIFICANT CHANGE UP (ref 3500–5200)
ANION GAP SERPL CALC-SCNC: 12 MMOL/L — SIGNIFICANT CHANGE UP (ref 5–17)
ANION GAP SERPL CALC-SCNC: 12 MMOL/L — SIGNIFICANT CHANGE UP (ref 5–17)
BUN SERPL-MCNC: 10 MG/DL — SIGNIFICANT CHANGE UP (ref 7–23)
BUN SERPL-MCNC: 10 MG/DL — SIGNIFICANT CHANGE UP (ref 7–23)
CALCIUM SERPL-MCNC: 9.1 MG/DL — SIGNIFICANT CHANGE UP (ref 8.4–10.5)
CALCIUM SERPL-MCNC: 9.1 MG/DL — SIGNIFICANT CHANGE UP (ref 8.4–10.5)
CHLORIDE SERPL-SCNC: 106 MMOL/L — SIGNIFICANT CHANGE UP (ref 96–108)
CHLORIDE SERPL-SCNC: 106 MMOL/L — SIGNIFICANT CHANGE UP (ref 96–108)
CO2 SERPL-SCNC: 21 MMOL/L — LOW (ref 22–31)
CO2 SERPL-SCNC: 21 MMOL/L — LOW (ref 22–31)
CREAT SERPL-MCNC: 0.68 MG/DL — SIGNIFICANT CHANGE UP (ref 0.5–1.3)
CREAT SERPL-MCNC: 0.68 MG/DL — SIGNIFICANT CHANGE UP (ref 0.5–1.3)
EGFR: 114 ML/MIN/1.73M2 — SIGNIFICANT CHANGE UP
EGFR: 114 ML/MIN/1.73M2 — SIGNIFICANT CHANGE UP
GLUCOSE SERPL-MCNC: 108 MG/DL — HIGH (ref 70–99)
GLUCOSE SERPL-MCNC: 108 MG/DL — HIGH (ref 70–99)
HCT VFR BLD CALC: 37.8 % — SIGNIFICANT CHANGE UP (ref 34.5–45)
HCT VFR BLD CALC: 37.8 % — SIGNIFICANT CHANGE UP (ref 34.5–45)
HGB BLD-MCNC: 12.9 G/DL — SIGNIFICANT CHANGE UP (ref 11.5–15.5)
HGB BLD-MCNC: 12.9 G/DL — SIGNIFICANT CHANGE UP (ref 11.5–15.5)
IGG CSF-MCNC: 2.1 MG/DL — SIGNIFICANT CHANGE UP
IGG CSF-MCNC: 2.1 MG/DL — SIGNIFICANT CHANGE UP
IGG FLD-MCNC: 936 MG/DL — SIGNIFICANT CHANGE UP (ref 610–1660)
IGG FLD-MCNC: 936 MG/DL — SIGNIFICANT CHANGE UP (ref 610–1660)
IGG SYNTH RATE SER+CSF CALC-MRATE: -2.7 MG/DAY — SIGNIFICANT CHANGE UP
IGG SYNTH RATE SER+CSF CALC-MRATE: -2.7 MG/DAY — SIGNIFICANT CHANGE UP
IGG/ALB CLEAR SER+CSF-RTO: 0.5 — SIGNIFICANT CHANGE UP
IGG/ALB CLEAR SER+CSF-RTO: 0.5 — SIGNIFICANT CHANGE UP
IGG/ALB CSF: 0.1 RATIO — SIGNIFICANT CHANGE UP
IGG/ALB CSF: 0.1 RATIO — SIGNIFICANT CHANGE UP
IGG/ALB SER: 0.21 RATIO — SIGNIFICANT CHANGE UP
IGG/ALB SER: 0.21 RATIO — SIGNIFICANT CHANGE UP
MCHC RBC-ENTMCNC: 30.9 PG — SIGNIFICANT CHANGE UP (ref 27–34)
MCHC RBC-ENTMCNC: 30.9 PG — SIGNIFICANT CHANGE UP (ref 27–34)
MCHC RBC-ENTMCNC: 34.1 GM/DL — SIGNIFICANT CHANGE UP (ref 32–36)
MCHC RBC-ENTMCNC: 34.1 GM/DL — SIGNIFICANT CHANGE UP (ref 32–36)
MCV RBC AUTO: 90.4 FL — SIGNIFICANT CHANGE UP (ref 80–100)
MCV RBC AUTO: 90.4 FL — SIGNIFICANT CHANGE UP (ref 80–100)
NRBC # BLD: 0 /100 WBCS — SIGNIFICANT CHANGE UP (ref 0–0)
NRBC # BLD: 0 /100 WBCS — SIGNIFICANT CHANGE UP (ref 0–0)
PLATELET # BLD AUTO: 212 K/UL — SIGNIFICANT CHANGE UP (ref 150–400)
PLATELET # BLD AUTO: 212 K/UL — SIGNIFICANT CHANGE UP (ref 150–400)
POTASSIUM SERPL-MCNC: 4.8 MMOL/L — SIGNIFICANT CHANGE UP (ref 3.5–5.3)
POTASSIUM SERPL-MCNC: 4.8 MMOL/L — SIGNIFICANT CHANGE UP (ref 3.5–5.3)
POTASSIUM SERPL-SCNC: 4.8 MMOL/L — SIGNIFICANT CHANGE UP (ref 3.5–5.3)
POTASSIUM SERPL-SCNC: 4.8 MMOL/L — SIGNIFICANT CHANGE UP (ref 3.5–5.3)
RBC # BLD: 4.18 M/UL — SIGNIFICANT CHANGE UP (ref 3.8–5.2)
RBC # BLD: 4.18 M/UL — SIGNIFICANT CHANGE UP (ref 3.8–5.2)
RBC # FLD: 12.2 % — SIGNIFICANT CHANGE UP (ref 10.3–14.5)
RBC # FLD: 12.2 % — SIGNIFICANT CHANGE UP (ref 10.3–14.5)
SODIUM SERPL-SCNC: 139 MMOL/L — SIGNIFICANT CHANGE UP (ref 135–145)
SODIUM SERPL-SCNC: 139 MMOL/L — SIGNIFICANT CHANGE UP (ref 135–145)
WBC # BLD: 5.84 K/UL — SIGNIFICANT CHANGE UP (ref 3.8–10.5)
WBC # BLD: 5.84 K/UL — SIGNIFICANT CHANGE UP (ref 3.8–10.5)
WBC # FLD AUTO: 5.84 K/UL — SIGNIFICANT CHANGE UP (ref 3.8–10.5)
WBC # FLD AUTO: 5.84 K/UL — SIGNIFICANT CHANGE UP (ref 3.8–10.5)

## 2023-12-21 PROCEDURE — 95718 EEG PHYS/QHP 2-12 HR W/VEEG: CPT

## 2023-12-21 PROCEDURE — 99232 SBSQ HOSP IP/OBS MODERATE 35: CPT

## 2023-12-21 RX ORDER — IMMUNE GLOBULIN (HUMAN) 10 G/100ML
35 INJECTION INTRAVENOUS; SUBCUTANEOUS DAILY
Refills: 0 | Status: DISCONTINUED | OUTPATIENT
Start: 2023-12-21 | End: 2023-12-21

## 2023-12-21 RX ORDER — IMMUNE GLOBULIN (HUMAN) 10 G/100ML
35 INJECTION INTRAVENOUS; SUBCUTANEOUS DAILY
Refills: 0 | Status: COMPLETED | OUTPATIENT
Start: 2023-12-21 | End: 2023-12-23

## 2023-12-21 RX ORDER — DIPHENHYDRAMINE HCL 50 MG
25 CAPSULE ORAL ONCE
Refills: 0 | Status: COMPLETED | OUTPATIENT
Start: 2023-12-21 | End: 2023-12-21

## 2023-12-21 RX ORDER — ACETAMINOPHEN 500 MG
650 TABLET ORAL DAILY
Refills: 0 | Status: COMPLETED | OUTPATIENT
Start: 2023-12-21 | End: 2023-12-23

## 2023-12-21 RX ORDER — PANTOPRAZOLE SODIUM 20 MG/1
40 TABLET, DELAYED RELEASE ORAL DAILY
Refills: 0 | Status: DISCONTINUED | OUTPATIENT
Start: 2023-12-21 | End: 2023-12-23

## 2023-12-21 RX ADMIN — Medication 50 MILLIGRAM(S): at 18:18

## 2023-12-21 RX ADMIN — ESCITALOPRAM OXALATE 5 MILLIGRAM(S): 10 TABLET, FILM COATED ORAL at 11:18

## 2023-12-21 RX ADMIN — PANTOPRAZOLE SODIUM 40 MILLIGRAM(S): 20 TABLET, DELAYED RELEASE ORAL at 18:21

## 2023-12-21 RX ADMIN — Medication 650 MILLIGRAM(S): at 11:44

## 2023-12-21 RX ADMIN — Medication 25 MILLIGRAM(S): at 11:44

## 2023-12-21 RX ADMIN — IMMUNE GLOBULIN (HUMAN) 58.33 GRAM(S): 10 INJECTION INTRAVENOUS; SUBCUTANEOUS at 13:04

## 2023-12-21 RX ADMIN — Medication 650 MILLIGRAM(S): at 18:20

## 2023-12-21 RX ADMIN — Medication 650 MILLIGRAM(S): at 12:06

## 2023-12-21 RX ADMIN — Medication 650 MILLIGRAM(S): at 18:48

## 2023-12-21 NOTE — DISCHARGE NOTE NURSING/CASE MANAGEMENT/SOCIAL WORK - PATIENT PORTAL LINK FT
You can access the FollowMyHealth Patient Portal offered by [facility] by registering at the following website: [email or web address]. By joining [facility]’s FollowMyHealth portal, you will also be able to view your health information using other applications (apps) compatible with our system. You can access the FollowMyHealth Patient Portal offered by [facility] by registering at the following website: [email or web address]. By joining [facility]’s FollowMyHealth portal, you will also be able to view your health information using other applications (apps) compatible with our system.

## 2023-12-21 NOTE — DISCHARGE NOTE NURSING/CASE MANAGEMENT/SOCIAL WORK - NSDCPEFALRISK_GEN_ALL_CORE
For information on Fall & Injury Prevention, visit: [email or web address] OR  [email or web address] OR  https://www.cdc.gov/steadi/patient.html For information on Fall & Injury Prevention, visit: [email or web address] OR  [email or web address] OR  https://www.cdc.gov/steadi/patient.html

## 2023-12-21 NOTE — PROGRESS NOTE ADULT - ASSESSMENT
MORE ANDREW is a 38 year old right handed lady with a PMH of Hashimoto's and hemochromatosis who presented to the EMU for elective admission for event capture.  Intermittent right arm and face paralysis and slurred speech on 10/23 which she was told was d/t viral meningitis, now with episodes of tingling and sometimes a feeling of paralysis on one side or both sides. She has presented to the hospital multiple times, and workup has been grossly unremarkable. She was having headaches, but these have since resolved. The etiology of these episodes is unclear, but seizures (perhaps precipitated by the meningitis) should be further screened for.          Impression:  Right arm numbness traveling to face followed by R arm and face paralysis. Same symptoms sometimes occurring in L side. Occasional jolts of the whole body. R/o seizure     Plan:  [] Monitor on vEEG for event capture off of AEDs.  [] vEEG showed Mild left temporal focal cerebral dysfunction.   [] D/c today  [] LP performed 12/20 at bedside, pt tolerated well, sent AIE and MS workup.   [] Lorazepam 2mg IV PRN for seizure activity lasting >3-5mins, >2x/hr, >3x/day, or if GTC , repeat after 1 minute (max dose 0.1 mg/kg)   [] Vimpat  mg is seizures resistant to Ativan  [] Continue home Lexapro 5 mg daily  [] Submitted request for prior [facility] hospitalization records.   [] neuro checks, NPO until swallow evaluation, seizure, fall & aspiration precautions  [] DVT ppx: Lovenox  [] Given concern for seizure, advise patient to not drive, operate heavy machinery, avoid heights, pools, bathtubs, locked doors until cleared by further follow up outpatient by neurology.   [] Please note: if patient has a convulsion, please document length of episode, specifically what patient was doing paying attention to eye opening vs closure, gaze deviation, shaking of extremities, tongue bite, urinary incontinence, any derangement of vital signs. Generalized motor seizures can have dilated and unreactive pupils, absent oculocephalic reflexes (no dolls eyes), and open eyelids (99% of cases). Head turning from sided to side, pelvic thrusting, bicycling movements, hand waving are NOT manifestations of generalized motor seizures.    Case discussed with Dr. Lincoln. Recommendations finalized once signed by attending.        MORE ANDREW is a 38 year old right handed lady with a PMH of Hashimoto's and hemochromatosis who presented to the EMU for elective admission for event capture.  Intermittent right arm and face paralysis and slurred speech on 10/23 which she was told was d/t viral meningitis, now with episodes of tingling and sometimes a feeling of paralysis on one side or both sides. She has presented to the hospital multiple times, and workup has been grossly unremarkable. She was having headaches, but these have since resolved. The etiology of these episodes is unclear, but seizures (perhaps precipitated by the meningitis) should be further screened for.          Impression:  Right arm numbness traveling to face followed by R arm and face paralysis. Same symptoms sometimes occurring in L side. Occasional jolts of the whole body. R/o seizure     Plan:  [] Monitor on vEEG for event capture off of AEDs.  [] vEEG showed Mild left temporal focal cerebral dysfunction.   [] D/c today  [] LP performed 12/20 at bedside, pt tolerated well, sent AIE and MS workup.   [] Lorazepam 2mg IV PRN for seizure activity lasting >3-5mins, >2x/hr, >3x/day, or if GTC , repeat after 1 minute (max dose 0.1 mg/kg)   [] Vimpat  mg is seizures resistant to Ativan  [] Continue home Lexapro 5 mg daily  [] Submitted request for prior [facility] hospitalization records.   [] neuro checks, NPO until swallow evaluation, seizure, fall & aspiration precautions  [] DVT ppx: Lovenox  [] Given concern for seizure, advise patient to not drive, operate heavy machinery, avoid heights, pools, bathtubs, locked doors until cleared by further follow up outpatient by neurology.   [] Please note: if patient has a convulsion, please document length of episode, specifically what patient was doing paying attention to eye opening vs closure, gaze deviation, shaking of extremities, tongue bite, urinary incontinence, any derangement of vital signs. Generalized motor seizures can have dilated and unreactive pupils, absent oculocephalic reflexes (no dolls eyes), and open eyelids (99% of cases). Head turning from sided to side, pelvic thrusting, bicycling movements, hand waving are NOT manifestations of generalized motor seizures.    Case discussed with Dr. Lincoln. Recommendations finalized once signed by attending.        MORE ANDREW is a 38 year old right handed lady with a PMH of Hashimoto's and hemochromatosis who presented to the EMU for elective admission for event capture.  Intermittent right arm and face paralysis and slurred speech on 10/23 which she was told was d/t viral meningitis, now with episodes of tingling and sometimes a feeling of paralysis on one side or both sides. She has presented to the hospital multiple times, and workup has been grossly unremarkable. She was having headaches, but these have since resolved. The etiology of these episodes is unclear, but seizures (perhaps precipitated by the meningitis) should be further screened for.          Impression:  Right arm numbness traveling to face followed by R arm and face paralysis. Same symptoms sometimes occurring in L side. Occasional jolts of the whole body. R/o seizure     Plan:  [] Start IVIG today for total of 3 days and dc on Saturday  [] Monitor on vEEG for event capture off of AEDs.  [] vEEG showed Mild left temporal focal cerebral dysfunction.   [] D/c today  [] LP performed 12/20 at bedside, pt tolerated well, sent AIE and MS workup.   [] Lorazepam 2mg IV PRN for seizure activity lasting >3-5mins, >2x/hr, >3x/day, or if GTC , repeat after 1 minute (max dose 0.1 mg/kg)   [] Vimpat  mg is seizures resistant to Ativan  [] Continue home Lexapro 5 mg daily  [] Submitted request for prior [facility] hospitalization records.   [] neuro checks, NPO until swallow evaluation, seizure, fall & aspiration precautions  [] DVT ppx: Lovenox  [] Given concern for seizure, advise patient to not drive, operate heavy machinery, avoid heights, pools, bathtubs, locked doors until cleared by further follow up outpatient by neurology.   [] Please note: if patient has a convulsion, please document length of episode, specifically what patient was doing paying attention to eye opening vs closure, gaze deviation, shaking of extremities, tongue bite, urinary incontinence, any derangement of vital signs. Generalized motor seizures can have dilated and unreactive pupils, absent oculocephalic reflexes (no dolls eyes), and open eyelids (99% of cases). Head turning from sided to side, pelvic thrusting, bicycling movements, hand waving are NOT manifestations of generalized motor seizures.    Case discussed with Dr. Lincoln. Recommendations finalized once signed by attending.        MORE ANDREW is a 38 year old right handed lady with a PMH of Hashimoto's and hemochromatosis who presented to the EMU for elective admission for event capture.  Intermittent right arm and face paralysis and slurred speech on 10/23 which she was told was d/t viral meningitis, now with episodes of tingling and sometimes a feeling of paralysis on one side or both sides. She has presented to the hospital multiple times, and workup has been grossly unremarkable. She was having headaches, but these have since resolved. The etiology of these episodes is unclear, but seizures (perhaps precipitated by the meningitis) should be further screened for.          Impression:  Right arm numbness traveling to face followed by R arm and face paralysis. Same symptoms sometimes occurring in L side. Occasional jolts of the whole body. R/o seizure     Plan:  [] Start IVIG today for total of 3 days and dc on Saturday  [] Monitor on vEEG for event capture off of AEDs.  [] vEEG showed Mild left temporal focal cerebral dysfunction.   [] D/c today  [] LP performed 12/20 at bedside, pt tolerated well, sent AIE and MS workup.   [] Lorazepam 2mg IV PRN for seizure activity lasting >3-5mins, >2x/hr, >3x/day, or if GTC , repeat after 1 minute (max dose 0.1 mg/kg)   [] Vimpat  mg is seizures resistant to Ativan  [] Continue home Lexapro 5 mg daily  [] Submitted request for prior [facility] hospitalization records.   [] neuro checks, NPO until swallow evaluation, seizure, fall & aspiration precautions  [] DVT ppx: Lovenox  [] Given concern for seizure, advise patient to not drive, operate heavy machinery, avoid heights, pools, bathtubs, locked doors until cleared by further follow up outpatient by neurology.   [] Please note: if patient has a convulsion, please document length of episode, specifically what patient was doing paying attention to eye opening vs closure, gaze deviation, shaking of extremities, tongue bite, urinary incontinence, any derangement of vital signs. Generalized motor seizures can have dilated and unreactive pupils, absent oculocephalic reflexes (no dolls eyes), and open eyelids (99% of cases). Head turning from sided to side, pelvic thrusting, bicycling movements, hand waving are NOT manifestations of generalized motor seizures.    Case discussed with Dr. Lincoln. Recommendations finalized once signed by attending.

## 2023-12-21 NOTE — EEG REPORT - NS EEG TEXT BOX
DAY 2 	START: 12/20/2023  08:00  	END: 12/21/2023  08:00  	DURATION: 24 hr     DAILY EEG VISUAL ANALYSIS  The background was continuous, symmetric, spontaneously variable. During wakefulness, the posterior dominant rhythm consisted of symmetric, well-modulated 10.5-11-Hz activity that attenuated to eye opening.  Low amplitude frontal beta.    BACKGROUND SLOWING:  No generalized background slowing was present.    FOCAL SLOWING:   Rare subtle bilateral temporal focal polymorphic delta slowing.    SLEEP BACKGROUND:  Drowsiness was characterized by fragmentation, attenuation, and slowing of the background activity.    Stage 2 sleep was characterized by the presence of symmetric vertex waves, sleep spindles and K-complexes. Vertex waves at times better formed on the right or the left.    OTHER NON-EPILEPTIFORM FINDINGS:  None were present.    ACTIVATION PROCEDURES:   Hyperventilation was not performed.    Photic stimulation was not performed.    INTERICTAL EPILEPTIFORM ACTIVITY:   None    EVENTS:  No events or seizures recorded.    ARTIFACTS:  Intermittent myogenic and movement artifacts were present.    Single-lead EKG: Regular rhythm    ASMs: None     -------------------------------------------------------------------------------------------------------------------------------------------------------  EEG SUMMARY:  Abnormal EEG in the awake, drowsy and asleep states.  Rare subtle bilateral temporal focal slowing.  No epileptiform pattern or seizures were recorded.    -------------------------------------------------------------------------------------------------------------------------------------------------------  IMPRESSION/CLINICAL CORRELATE:  Mild bilateral temporal focal cerebral dysfunction can be structural or functional in etiology.    -------------------------------------------------------------------------------------------------------------------------------------------------------  Elham Paz MD  Fellow, [facility] [place]    Salome Shepherd MD  Attending Physician, [facility] [place]    ------------------------------------  EEG Reading Room: [phone]  On Call Service After Hours: [phone]   DAY 2 	START: 12/20/2023  08:00  	END: 12/21/2023  08:00  	DURATION: 24 hr     DAILY EEG VISUAL ANALYSIS  The background was continuous, symmetric, spontaneously variable. During wakefulness, the posterior dominant rhythm consisted of symmetric, well-modulated 10.5-11-Hz activity that attenuated to eye opening.  Low amplitude frontal beta.    BACKGROUND SLOWING:  No generalized background slowing was present.    FOCAL SLOWING:   Rare subtle bilateral temporal focal polymorphic delta slowing.    SLEEP BACKGROUND:  Drowsiness was characterized by fragmentation, attenuation, and slowing of the background activity.    Stage 2 sleep was characterized by the presence of symmetric vertex waves, sleep spindles and K-complexes. Vertex waves at times better formed on the right or the left.    OTHER NON-EPILEPTIFORM FINDINGS:  None were present.    ACTIVATION PROCEDURES:   Hyperventilation was not performed.    Photic stimulation was not performed.    INTERICTAL EPILEPTIFORM ACTIVITY:   None    EVENTS:  No events or seizures recorded.    ARTIFACTS:  Intermittent myogenic and movement artifacts were present.    Single-lead EKG: Regular rhythm    ASMs: None     -------------------------------------------------------------------------------------------------------------------------------------------------------  EEG SUMMARY:  Abnormal EEG in the awake, drowsy and asleep states.  Rare subtle bilateral temporal focal slowing.  No epileptiform pattern or seizures were recorded.    -------------------------------------------------------------------------------------------------------------------------------------------------------  IMPRESSION/CLINICAL CORRELATE:  Mild bilateral temporal focal cerebral dysfunction can be structural or functional in etiology.    -------------------------------------------------------------------------------------------------------------------------------------------------------  Elham Paz MD  Fellow, [facility] [place]    Salome Shepherd MD  Attending Physician, [facility] [place]    ------------------------------------  EEG Reading Room: [phone]  On Call Service After Hours: [phone]   DAY 2 	START: 12/20/2023  08:00  	END: 12/21/2023 11:58  	DURATION: 27 hr 58m     DAILY EEG VISUAL ANALYSIS  The background was continuous, symmetric, spontaneously variable. During wakefulness, the posterior dominant rhythm consisted of symmetric, well-modulated 10.5-11-Hz activity that attenuated to eye opening.  Low amplitude frontal beta.    BACKGROUND SLOWING:  No generalized background slowing was present.    FOCAL SLOWING:   Rare subtle bilateral temporal focal polymorphic delta slowing.    SLEEP BACKGROUND:  Drowsiness was characterized by fragmentation, attenuation, and slowing of the background activity.    Stage 2 sleep was characterized by the presence of symmetric vertex waves, sleep spindles and K-complexes. Vertex waves at times better formed on the right or the left.    OTHER NON-EPILEPTIFORM FINDINGS:  None were present.    ACTIVATION PROCEDURES:   Hyperventilation was not performed.    Photic stimulation was not performed.    INTERICTAL EPILEPTIFORM ACTIVITY:   None    EVENTS:  No events or seizures recorded.    ARTIFACTS:  Intermittent myogenic and movement artifacts were present.    Single-lead EKG: Regular rhythm    ASMs: None     -------------------------------------------------------------------------------------------------------------------------------------------------------  EEG SUMMARY:  Abnormal EEG in the awake, drowsy and asleep states.  Rare subtle bilateral temporal focal slowing.  No epileptiform pattern or seizures were recorded.    -------------------------------------------------------------------------------------------------------------------------------------------------------  IMPRESSION/CLINICAL CORRELATE:  Mild bilateral temporal focal cerebral dysfunction can be structural or functional in etiology.    -------------------------------------------------------------------------------------------------------------------------------------------------------  Elham Paz MD  Fellow, [facility]ile[facility] [place]    Salome Shepherd MD  Attending Physician, [facility] [place]    ------------------------------------  EEG Reading Room: [phone]  On Call Service After Hours: [phone]   DAY 2 	START: 12/20/2023  08:00  	END: 12/21/2023 11:58  	DURATION: 27 hr 58m     DAILY EEG VISUAL ANALYSIS  The background was continuous, symmetric, spontaneously variable. During wakefulness, the posterior dominant rhythm consisted of symmetric, well-modulated 10.5-11-Hz activity that attenuated to eye opening.  Low amplitude frontal beta.    BACKGROUND SLOWING:  No generalized background slowing was present.    FOCAL SLOWING:   Rare subtle bilateral temporal focal polymorphic delta slowing.    SLEEP BACKGROUND:  Drowsiness was characterized by fragmentation, attenuation, and slowing of the background activity.    Stage 2 sleep was characterized by the presence of symmetric vertex waves, sleep spindles and K-complexes. Vertex waves at times better formed on the right or the left.    OTHER NON-EPILEPTIFORM FINDINGS:  None were present.    ACTIVATION PROCEDURES:   Hyperventilation was not performed.    Photic stimulation was not performed.    INTERICTAL EPILEPTIFORM ACTIVITY:   None    EVENTS:  No events or seizures recorded.    ARTIFACTS:  Intermittent myogenic and movement artifacts were present.    Single-lead EKG: Regular rhythm    ASMs: None     -------------------------------------------------------------------------------------------------------------------------------------------------------  EEG SUMMARY:  Abnormal EEG in the awake, drowsy and asleep states.  Rare subtle bilateral temporal focal slowing.  No epileptiform pattern or seizures were recorded.    -------------------------------------------------------------------------------------------------------------------------------------------------------  IMPRESSION/CLINICAL CORRELATE:  Mild bilateral temporal focal cerebral dysfunction can be structural or functional in etiology.    -------------------------------------------------------------------------------------------------------------------------------------------------------  Elham Paz MD  Fellow, [facility]ile[facility] [place]    Salome Shepherd MD  Attending Physician, [facility] [place]    ------------------------------------  EEG Reading Room: [phone]  On Call Service After Hours: [phone]   DAY 2 	START: 12/20/2023  08:00  	END: 12/21/2023 11:58  	DURATION: 27 hr 58 min     DAILY EEG VISUAL ANALYSIS  The background was continuous, symmetric, spontaneously variable. During wakefulness, the posterior dominant rhythm consisted of symmetric, well-modulated 10.5-11-Hz activity that attenuated to eye opening.  Low amplitude frontal beta.    BACKGROUND SLOWING:  No generalized background slowing was present.    FOCAL SLOWING:   Rare subtle bilateral temporal focal polymorphic delta slowing.    SLEEP BACKGROUND:  Drowsiness was characterized by fragmentation, attenuation, and slowing of the background activity.    Stage 2 sleep was characterized by the presence of symmetric vertex waves, sleep spindles and K-complexes. Vertex waves at times better formed on the right or the left.    OTHER NON-EPILEPTIFORM FINDINGS:  None were present.    ACTIVATION PROCEDURES:   Hyperventilation was not performed.    Photic stimulation was not performed.    INTERICTAL EPILEPTIFORM ACTIVITY:   None    EVENTS:  No events or seizures recorded.    ARTIFACTS:  Intermittent myogenic and movement artifacts were present.    Single-lead EKG: Regular rhythm    ASMs: None     -------------------------------------------------------------------------------------------------------------------------------------------------------  EEG SUMMARY:  Abnormal EEG in the awake, drowsy and asleep states.  Rare subtle bilateral temporal focal slowing.  No epileptiform pattern or seizures were recorded.    -------------------------------------------------------------------------------------------------------------------------------------------------------  IMPRESSION/CLINICAL CORRELATE:  Mild bilateral temporal focal cerebral dysfunction can be structural or functional in etiology.    -------------------------------------------------------------------------------------------------------------------------------------------------------  Elham Paz MD  Fellow, [facility]ilepsy [place]    Salome Shepherd MD  Attending Physician, [facility] [place]    ------------------------------------  EEG Reading Room: [phone]  On Call Service After Hours: [phone]   DAY 2 	START: 12/20/2023  08:00  	END: 12/21/2023 11:58  	DURATION: 27 hr 58 min     DAILY EEG VISUAL ANALYSIS  The background was continuous, symmetric, spontaneously variable. During wakefulness, the posterior dominant rhythm consisted of symmetric, well-modulated 10.5-11-Hz activity that attenuated to eye opening.  Low amplitude frontal beta.    BACKGROUND SLOWING:  No generalized background slowing was present.    FOCAL SLOWING:   Rare subtle bilateral temporal focal polymorphic delta slowing.    SLEEP BACKGROUND:  Drowsiness was characterized by fragmentation, attenuation, and slowing of the background activity.    Stage 2 sleep was characterized by the presence of symmetric vertex waves, sleep spindles and K-complexes. Vertex waves at times better formed on the right or the left.    OTHER NON-EPILEPTIFORM FINDINGS:  None were present.    ACTIVATION PROCEDURES:   Hyperventilation was not performed.    Photic stimulation was not performed.    INTERICTAL EPILEPTIFORM ACTIVITY:   None    EVENTS:  No events or seizures recorded.    ARTIFACTS:  Intermittent myogenic and movement artifacts were present.    Single-lead EKG: Regular rhythm    ASMs: None     -------------------------------------------------------------------------------------------------------------------------------------------------------  EEG SUMMARY:  Abnormal EEG in the awake, drowsy and asleep states.  Rare subtle bilateral temporal focal slowing.  No epileptiform pattern or seizures were recorded.    -------------------------------------------------------------------------------------------------------------------------------------------------------  IMPRESSION/CLINICAL CORRELATE:  Mild bilateral temporal focal cerebral dysfunction can be structural or functional in etiology.    -------------------------------------------------------------------------------------------------------------------------------------------------------  Elham Paz MD  Fellow, [facility]ilepsy [place]    Salome Shepherd MD  Attending Physician, [facility] [place]    ------------------------------------  EEG Reading Room: [phone]  On Call Service After Hours: [phone]

## 2023-12-21 NOTE — DISCHARGE NOTE NURSING/CASE MANAGEMENT/SOCIAL WORK - MODE OF TRANSPORTATION
Patient is a 66y old  Male who presents with a chief complaint of angioedema, chronically on Enalapril      Over Night Events:  none        ROS:  See HPI    PHYSICAL EXAM    ICU Vital Signs Last 24 Hrs  T(C): 36.7 (12 Nov 2018 07:01), Max: 36.7 (12 Nov 2018 07:01)  T(F): 98 (12 Nov 2018 07:01), Max: 98 (12 Nov 2018 07:01)  HR: 55 (12 Nov 2018 06:41) (51 - 59)  BP: 124/58 (12 Nov 2018 06:00) (100/35 - 124/58)  BP(mean): 83 (12 Nov 2018 05:59) (83 - 83)  ABP: --  ABP(mean): --  RR: 18 (12 Nov 2018 07:01) (18 - 18)  SpO2: 97% (12 Nov 2018 06:41) (95% - 98%)      General: NAD  HEENT: SYEDA             Lymphatic system: No cervical LN   Lungs: Bilateral BS, clear  Cardiovascular: Regular   Gastrointestinal: Soft, Positive BS  Extremities: No clubbing.  Moves extremities.  Full Range of motion   Skin: Warm, intact  Neurological: No motor or sensory deficit         LABS:                            13.6   4.50  )-----------( 75       ( 12 Nov 2018 03:10 )             38.0                                               11-12    126<L>  |  92<L>  |  78<HH>  ----------------------------<  125<H>  5.7<H>   |  21  |  1.1    Ca    9.2      12 Nov 2018 03:10    TPro  8.0  /  Alb  4.1  /  TBili  0.8  /  DBili  x   /  AST  38  /  ALT  17  /  AlkPhos  96  11-12      PT/INR - ( 12 Nov 2018 03:10 )   PT: 12.00 sec;   INR: 1.11 ratio         PTT - ( 12 Nov 2018 03:10 )  PTT:29.6 sec                                                                                     LIVER FUNCTIONS - ( 12 Nov 2018 03:10 )  Alb: 4.1 g/dL / Pro: 8.0 g/dL / ALK PHOS: 96 U/L / ALT: 17 U/L / AST: 38 U/L / GGT: x                                                                                                                                       MEDICATIONS  (STANDING):  diphenhydrAMINE   Injectable 25 milliGRAM(s) IV Push every 6 hours  famotidine  IVPB 20 milliGRAM(s) IV Intermittent two times a day  heparin  Injectable 5000 Unit(s) SubCutaneous every 12 hours  influenza   Vaccine 0.5 milliLiter(s) IntraMuscular once  lactated ringers. 1000 milliLiter(s) (75 mL/Hr) IV Continuous <Continuous>  loratadine 10 milliGRAM(s) Oral daily  methylPREDNISolone sodium succinate Injectable 60 milliGRAM(s) IV Push three times a day  metoprolol tartrate 25 milliGRAM(s) Oral two times a day  nicotine - 21 mG/24Hr(s) Patch 1 patch Transdermal daily    MEDICATIONS  (PRN):      Xrays: clear Patient is a 66y old  Male who presents with a chief complaint of angioedema, chronically on Enalapril      Over Night Events:  None  Swelling better          ROS:  See HPI    PHYSICAL EXAM    ICU Vital Signs Last 24 Hrs  T(C): 36.7 (12 Nov 2018 07:01), Max: 36.7 (12 Nov 2018 07:01)  T(F): 98 (12 Nov 2018 07:01), Max: 98 (12 Nov 2018 07:01)  HR: 55 (12 Nov 2018 06:41) (51 - 59)  BP: 124/58 (12 Nov 2018 06:00) (100/35 - 124/58)  BP(mean): 83 (12 Nov 2018 05:59) (83 - 83)  ABP: --  ABP(mean): --  RR: 18 (12 Nov 2018 07:01) (18 - 18)  SpO2: 97% (12 Nov 2018 06:41) (95% - 98%)      General: NAD  HEENT: SYEDA             Lymphatic system: No cervical LN   Lungs: Bilateral BS, clear  Cardiovascular: Regular   Gastrointestinal: Soft, Positive BS  Extremities: No clubbing.  Moves extremities.  Full Range of motion   Skin: Warm, intact  Neurological: No motor or sensory deficit         LABS:                            13.6   4.50  )-----------( 75       ( 12 Nov 2018 03:10 )             38.0                                               11-12    126<L>  |  92<L>  |  78<HH>  ----------------------------<  125<H>  5.7<H>   |  21  |  1.1    Ca    9.2      12 Nov 2018 03:10    TPro  8.0  /  Alb  4.1  /  TBili  0.8  /  DBili  x   /  AST  38  /  ALT  17  /  AlkPhos  96  11-12      PT/INR - ( 12 Nov 2018 03:10 )   PT: 12.00 sec;   INR: 1.11 ratio         PTT - ( 12 Nov 2018 03:10 )  PTT:29.6 sec                                                                                     LIVER FUNCTIONS - ( 12 Nov 2018 03:10 )  Alb: 4.1 g/dL / Pro: 8.0 g/dL / ALK PHOS: 96 U/L / ALT: 17 U/L / AST: 38 U/L / GGT: x                                                                                                                                       MEDICATIONS  (STANDING):  diphenhydrAMINE   Injectable 25 milliGRAM(s) IV Push every 6 hours  famotidine  IVPB 20 milliGRAM(s) IV Intermittent two times a day  heparin  Injectable 5000 Unit(s) SubCutaneous every 12 hours  influenza   Vaccine 0.5 milliLiter(s) IntraMuscular once  lactated ringers. 1000 milliLiter(s) (75 mL/Hr) IV Continuous <Continuous>  loratadine 10 milliGRAM(s) Oral daily  methylPREDNISolone sodium succinate Injectable 60 milliGRAM(s) IV Push three times a day  metoprolol tartrate 25 milliGRAM(s) Oral two times a day  nicotine - 21 mG/24Hr(s) Patch 1 patch Transdermal daily    MEDICATIONS  (PRN):      Xrays: clear Ambulatory

## 2023-12-21 NOTE — PROGRESS NOTE ADULT - ATTENDING COMMENTS
her EEG was normal( intermittent LT focal slowing, nocturnal only )  her csf showed 11 wbc  will start empiric tretmnet with ivig/solumedol for 3 days  awaiting [facility] panel her EEG was normal( intermittent LT focal slowing, nocturnal only )  her csf showed 11 wbc  will start empiric tretmnet with ivig/solumedol for 3 days  awaiting [facility] panel

## 2023-12-21 NOTE — PROGRESS NOTE ADULT - SUBJECTIVE AND OBJECTIVE BOX
Neurology Progress Note    SUBJECTIVE/OBJECTIVE/INTERVAL EVENTS: Patient seen and examined at bedside w/ neuro attending and team.     INTERVAL HISTORY:    REVIEW OF SYSTEMS: Otherwise denies fever, chills, headaches, vision changes, blurry vision, double vision, nausea, vomiting, hearing change, focal weakness, focal numbness, parasthesias, bowel/ bladder incontinence.  Few questions of a 10-system ROS was performed and is negative except for those items noted above and/or in the HPI.    MEDICATIONS  (STANDING):  enoxaparin Injectable 40 milliGRAM(s) SubCutaneous every 24 hours  escitalopram 5 milliGRAM(s) Oral daily    MEDICATIONS  (PRN):  acetaminophen     Tablet .. 650 milliGRAM(s) Oral every 6 hours PRN Temp greater or equal to 38C (100.4F), Mild Pain (1 - 3)  lacosamide 200 milliGRAM(s) Oral once PRN Seizure resistant to Ativan  LORazepam   Injectable 2 milliGRAM(s) IV Push once PRN seizure activity greater then 3min with vital sign changes.      VITALS & EXAMINATION:  Vital Signs Last 24 Hrs  T(C): 37.1 (21 Dec 2023 05:16), Max: 37.1 (20 Dec 2023 12:22)  T(F): 98.8 (21 Dec 2023 05:16), Max: 98.8 (21 Dec 2023 05:16)  HR: 66 (21 Dec 2023 05:16) (64 - 72)  BP: 100/63 (21 Dec 2023 05:16) (100/63 - 117/80)  BP(mean): --  RR: 18 (21 Dec 2023 05:16) (18 - 18)  SpO2: 100% (21 Dec 2023 05:16) (96% - 100%)    Parameters below as of 21 Dec 2023 05:16  Patient On (Oxygen Delivery Method): room air        General:  Constitutional: Female, appears stated age, nontoxic, not in distress,    Head: Normocephalic;   Eyes: clear sclera;   Extremities: No cyanosis;   Resp: breathing comfortably  Neck: no nuchal rigidity  Fundoscopic exam:      Neurological (>12):  MS: Awake, alert.  Oriented person place situation. Follows commands. Attends to examiner  Language: Speech is hypophonic, clear, fluent, good repetition,  comprehension, registration of words.  CNs: PERRL (R 3mm, L 3mm). VFF. EOMI. No disconjugate gaze, nystagmus. V1-3 intact LT, No facial asymmetry b/l. Hearing grossly normal b/l. Tongue midline and can move side to side.     Motor - Normal bulk and tone throughout. No pronator drift.    L/R (out of 5 each)       Deltoid  5/5    Biceps   5/5      Triceps  5/5         Wrist Extension 5/5   Wrist Flexion  5/5   Interossei 5/5     5/5   L/R (out of 5 each)       Hip Flexion  5/5    Hip Extension  5/5  Knee Extension  5/5  Dorsiflexion  5/5      Plantar Flexion 5/5     Sensation: Intact to LT b/l. Cortical: Extinction on DSS (neglect): none  Reflexes L/R:  Biceps(C5) 2/2  BR(C6) 2/2   Triceps(C7)  2/2 Patellar(L4)   2/2   Ankles 2/2   Toes: mute b/l  Coordination: No dysmetria to FTN b/l UE  Gait: Normal Romberg. No postural instability. Normal stance. Gait baseline.    LABORATORY:  CBC                       12.9   5.84  )-----------( 212      ( 21 Dec 2023 06:35 )             37.8     Chem 12-21    139  |  106  |  10  ----------------------------<  108<H>  4.8   |  21<L>  |  0.68    Ca    9.1      21 Dec 2023 06:35  Phos  3.2     12-19  Mg     2.2     12-19      LFTs   Coagulopathy   Lipid Panel   A1c   Cardiac enzymes     U/A Urinalysis Basic - ( 21 Dec 2023 06:35 )    Color: x / Appearance: x / SG: x / pH: x  Gluc: 108 mg/dL / Ketone: x  / Bili: x / Urobili: x   Blood: x / Protein: x / Nitrite: x   Leuk Esterase: x / RBC: x / WBC x   Sq Epi: x / Non Sq Epi: x / Bacteria: x      CSF  Immunological  Other    STUDIES & IMAGING: (EEG, CT, MR, U/S, TTE/EHSAN): Neurology Progress Note    SUBJECTIVE/OBJECTIVE/INTERVAL EVENTS: Patient seen and examined at bedside w/ neuro attending and team.   Tolerated the lumbar puncture yesterday afternoon well without headache. Patient had no witnessed seizure like episodes overnight.     MEDICATIONS  (STANDING):  enoxaparin Injectable 40 milliGRAM(s) SubCutaneous every 24 hours  escitalopram 5 milliGRAM(s) Oral daily    MEDICATIONS  (PRN):  acetaminophen     Tablet .. 650 milliGRAM(s) Oral every 6 hours PRN Temp greater or equal to 38C (100.4F), Mild Pain (1 - 3)  lacosamide 200 milliGRAM(s) Oral once PRN Seizure resistant to Ativan  LORazepam   Injectable 2 milliGRAM(s) IV Push once PRN seizure activity greater then 3min with vital sign changes.      VITALS & EXAMINATION:  Vital Signs Last 24 Hrs  T(C): 37.1 (21 Dec 2023 05:16), Max: 37.1 (20 Dec 2023 12:22)  T(F): 98.8 (21 Dec 2023 05:16), Max: 98.8 (21 Dec 2023 05:16)  HR: 66 (21 Dec 2023 05:16) (64 - 72)  BP: 100/63 (21 Dec 2023 05:16) (100/63 - 117/80)  BP(mean): --  RR: 18 (21 Dec 2023 05:16) (18 - 18)  SpO2: 100% (21 Dec 2023 05:16) (96% - 100%)    Parameters below as of 21 Dec 2023 05:16  Patient On (Oxygen Delivery Method): room air        General:  Constitutional: Female, appears stated age, nontoxic, not in distress,    Head: Normocephalic;   Eyes: clear sclera;   Extremities: No cyanosis;   Resp: breathing comfortably  Neck: no nuchal rigidity  Fundoscopic exam:      Neurological (>12):  MS: Awake, alert.  Oriented person place situation. Follows commands. Attends to examiner  Language: Speech is clear, fluent, good repetition,  comprehension, registration of words.  CNs: PERRL (R 3mm, L 3mm). VFF. EOMI. No disconjugate gaze, nystagmus. V1-3 intact LT, No facial asymmetry b/l. Hearing grossly normal b/l. Tongue midline and can move side to side.     Motor - Normal bulk and tone throughout. No pronator drift.    L/R (out of 5 each)       Deltoid  5/5    Biceps   5/5      Triceps  5/5         Wrist Extension 5/5   Wrist Flexion  5/5   Interossei 5/5     5/5   L/R (out of 5 each)       Hip Flexion  5/5    Hip Extension  5/5  Knee Extension  5/5  Dorsiflexion  5/5      Plantar Flexion 5/5     Sensation: Intact to LT b/l.   Reflexes L/R:  Biceps(C5) 2/2  BR(C6) 2/2   Triceps(C7)  2/2 Patellar(L4)   2/2   Ankles 2/2   Toes: mute b/l  Coordination: No dysmetria to FTN b/l UE  Gait: Normal Romberg. No postural instability. Normal stance. Gait baseline.    LABORATORY:  CBC                       12.9   5.84  )-----------( 212      ( 21 Dec 2023 06:35 )             37.8     Chem 12-21    139  |  106  |  10  ----------------------------<  108<H>  4.8   |  21<L>  |  0.68    Ca    9.1      21 Dec 2023 06:35  Phos  3.2     12-19  Mg     2.2     12-19      LFTs   Coagulopathy   Lipid Panel   A1c   Cardiac enzymes     U/A Urinalysis Basic - ( 21 Dec 2023 06:35 )    Color: x / Appearance: x / SG: x / pH: x  Gluc: 108 mg/dL / Ketone: x  / Bili: x / Urobili: x   Blood: x / Protein: x / Nitrite: x   Leuk Esterase: x / RBC: x / WBC x   Sq Epi: x / Non Sq Epi: x / Bacteria: x      CSF  Immunological  Other    STUDIES & IMAGING: (EEG, CT, MR, U/S, TTE/EHSAN):    · EEG Report	  DAY 1 	START: 12/19/2023  16:55:31 PM     	END: 12/20/2023  08:00  	DURATION: 14 hr 33 min    DAILY EEG VISUAL ANALYSIS    The background was continuous, symmetric, spontaneously variable. During wakefulness, the posterior dominant rhythm consisted of symmetric, well-modulated 11-Hz activity that attenuated to eye opening.  Low amplitude frontal beta.    BACKGROUND SLOWING:  No generalized background slowing was present.    FOCAL SLOWING:   Rare left temporal focal polymorphic delta slowing.    SLEEP BACKGROUND:  Drowsiness was characterized by fragmentation, attenuation, and slowing of the background activity.    Stage 2 sleep was characterized by the presence of symmetric vertex waves, sleep spindles and K-complexes.    OTHER NON-EPILEPTIFORM FINDINGS:  None were present.    ACTIVATION PROCEDURES:   Hyperventilation was not performed.    Photic stimulation was not performed.    INTERICTAL EPILEPTIFORM ACTIVITY:   None    EVENTS:  No events or seizures recorded.    ARTIFACTS:  Intermittent myogenic and movement artifacts were noted. Near continuous P3 electrode artifact.    Single-lead EKG: Regular rhythm    ASMs: None     -------------------------------------------------------------------------------------------------------------------------------------------------------  EEG SUMMARY:  Abnormal EEG in the awake, drowsy and asleep states.  Rare left temporal focal slowing.  No epileptiform pattern or seizures were recorded.    -------------------------------------------------------------------------------------------------------------------------------------------------------  IMPRESSION/CLINICAL CORRELATE:  Mild left temporal focal cerebral dysfunction can be structural or functional in etiology.    -------------------------------------------------------------------------------------------------------------------------------------------------------  Elham Paz MD  Fellow, [facility] [place]    Salome Shepherd MD  Attending Physician, [facility] [place]   Neurology Progress Note    SUBJECTIVE/OBJECTIVE/INTERVAL EVENTS: Patient seen and examined at bedside w/ neuro attending and team.   Tolerated the lumbar puncture yesterday afternoon well without headache. Patient had no witnessed seizure like episodes overnight.     MEDICATIONS  (STANDING):  enoxaparin Injectable 40 milliGRAM(s) SubCutaneous every 24 hours  escitalopram 5 milliGRAM(s) Oral daily    MEDICATIONS  (PRN):  acetaminophen     Tablet .. 650 milliGRAM(s) Oral every 6 hours PRN Temp greater or equal to 38C (100.4F), Mild Pain (1 - 3)  lacosamide 200 milliGRAM(s) Oral once PRN Seizure resistant to Ativan  LORazepam   Injectable 2 milliGRAM(s) IV Push once PRN seizure activity greater then 3min with vital sign changes.      VITALS & EXAMINATION:  Vital Signs Last 24 Hrs  T(C): 37.1 (21 Dec 2023 05:16), Max: 37.1 (20 Dec 2023 12:22)  T(F): 98.8 (21 Dec 2023 05:16), Max: 98.8 (21 Dec 2023 05:16)  HR: 66 (21 Dec 2023 05:16) (64 - 72)  BP: 100/63 (21 Dec 2023 05:16) (100/63 - 117/80)  BP(mean): --  RR: 18 (21 Dec 2023 05:16) (18 - 18)  SpO2: 100% (21 Dec 2023 05:16) (96% - 100%)    Parameters below as of 21 Dec 2023 05:16  Patient On (Oxygen Delivery Method): room air        General:  Constitutional: Female, appears stated age, nontoxic, not in distress,    Head: Normocephalic;   Eyes: clear sclera;   Extremities: No cyanosis;   Resp: breathing comfortably  Neck: no nuchal rigidity  Fundoscopic exam:      Neurological (>12):  MS: Awake, alert.  Oriented person place situation. Follows commands. Attends to examiner  Language: Speech is clear, fluent, good repetition,  comprehension, registration of words.  CNs: PERRL (R 3mm, L 3mm). VFF. EOMI. No disconjugate gaze, nystagmus. V1-3 intact LT, No facial asymmetry b/l. Hearing grossly normal b/l. Tongue midline and can move side to side.     Motor - Normal bulk and tone throughout. No pronator drift.    L/R (out of 5 each)       Deltoid  5/5    Biceps   5/5      Triceps  5/5         Wrist Extension 5/5   Wrist Flexion  5/5   Interossei 5/5     5/5   L/R (out of 5 each)       Hip Flexion  5/5    Hip Extension  5/5  Knee Extension  5/5  Dorsiflexion  5/5      Plantar Flexion 5/5     Sensation: Intact to LT b/l.   Reflexes L/R:  Biceps(C5) 2/2  BR(C6) 2/2   Triceps(C7)  2/2 Patellar(L4)   2/2   Ankles 2/2   Toes: mute b/l  Coordination: No dysmetria to FTN b/l UE  Gait: Normal Romberg. No postural instability. Normal stance. Gait baseline.    LABORATORY:  CBC                       12.9   5.84  )-----------( 212      ( 21 Dec 2023 06:35 )             37.8     Chem 12-21    139  |  106  |  10  ----------------------------<  108<H>  4.8   |  21<L>  |  0.68    Ca    9.1      21 Dec 2023 06:35  Phos  3.2     12-19  Mg     2.2     12-19      LFTs   Coagulopathy   Lipid Panel   A1c   Cardiac enzymes     U/A Urinalysis Basic - ( 21 Dec 2023 06:35 )    Color: x / Appearance: x / SG: x / pH: x  Gluc: 108 mg/dL / Ketone: x  / Bili: x / Urobili: x   Blood: x / Protein: x / Nitrite: x   Leuk Esterase: x / RBC: x / WBC x   Sq Epi: x / Non Sq Epi: x / Bacteria: x      CSF  Immunological  Other    STUDIES & IMAGING: (EEG, CT, MR, U/S, TTE/EHSAN):    · EEG Report	  DAY 1 	START: 12/19/2023  16:55:31 PM     	END: 12/20/2023  08:00  	DURATION: 14 hr 33 min    DAILY EEG VISUAL ANALYSIS    The background was continuous, symmetric, spontaneously variable. During wakefulness, the posterior dominant rhythm consisted of symmetric, well-modulated 11-Hz activity that attenuated to eye opening.  Low amplitude frontal beta.    BACKGROUND SLOWING:  No generalized background slowing was present.    FOCAL SLOWING:   Rare left temporal focal polymorphic delta slowing.    SLEEP BACKGROUND:  Drowsiness was characterized by fragmentation, attenuation, and slowing of the background activity.    Stage 2 sleep was characterized by the presence of symmetric vertex waves, sleep spindles and K-complexes.    OTHER NON-EPILEPTIFORM FINDINGS:  None were present.    ACTIVATION PROCEDURES:   Hyperventilation was not performed.    Photic stimulation was not performed.    INTERICTAL EPILEPTIFORM ACTIVITY:   None    EVENTS:  No events or seizures recorded.    ARTIFACTS:  Intermittent myogenic and movement artifacts were noted. Near continuous P3 electrode artifact.    Single-lead EKG: Regular rhythm    ASMs: None     -------------------------------------------------------------------------------------------------------------------------------------------------------  EEG SUMMARY:  Abnormal EEG in the awake, drowsy and asleep states.  Rare left temporal focal slowing.  No epileptiform pattern or seizures were recorded.    -------------------------------------------------------------------------------------------------------------------------------------------------------  IMPRESSION/CLINICAL CORRELATE:  Mild left temporal focal cerebral dysfunction can be structural or functional in etiology.    -------------------------------------------------------------------------------------------------------------------------------------------------------  Elham Paz MD  Fellow, [facility] [place]    Salome Shepherd MD  Attending Physician, [facility] [place]

## 2023-12-22 PROCEDURE — 99232 SBSQ HOSP IP/OBS MODERATE 35: CPT

## 2023-12-22 RX ORDER — DIPHENHYDRAMINE HCL 50 MG
25 CAPSULE ORAL ONCE
Refills: 0 | Status: COMPLETED | OUTPATIENT
Start: 2023-12-22 | End: 2023-12-22

## 2023-12-22 RX ORDER — DIPHENHYDRAMINE HCL 50 MG
25 CAPSULE ORAL
Refills: 0 | Status: COMPLETED | OUTPATIENT
Start: 2023-12-23 | End: 2023-12-23

## 2023-12-22 RX ADMIN — Medication 650 MILLIGRAM(S): at 09:03

## 2023-12-22 RX ADMIN — PANTOPRAZOLE SODIUM 40 MILLIGRAM(S): 20 TABLET, DELAYED RELEASE ORAL at 11:06

## 2023-12-22 RX ADMIN — Medication 25 MILLIGRAM(S): at 09:02

## 2023-12-22 RX ADMIN — IMMUNE GLOBULIN (HUMAN) 58.33 GRAM(S): 10 INJECTION INTRAVENOUS; SUBCUTANEOUS at 10:05

## 2023-12-22 RX ADMIN — ESCITALOPRAM OXALATE 5 MILLIGRAM(S): 10 TABLET, FILM COATED ORAL at 11:06

## 2023-12-22 RX ADMIN — Medication 50 MILLIGRAM(S): at 09:08

## 2023-12-22 RX ADMIN — Medication 650 MILLIGRAM(S): at 09:24

## 2023-12-22 NOTE — PROGRESS NOTE ADULT - ASSESSMENT
38 year old right handed lady with a PMH of Hashimoto's and hemochromatosis who presented to the EMU for elective admission for event capture.  Intermittent right arm and face paralysis and slurred speech on 10/23 which she was told was d/t viral meningitis, now with episodes of tingling and sometimes a feeling of paralysis on one side or both sides. She has presented to the hospital multiple times, and workup has been grossly unremarkable. She was having headaches, but these have since resolved. The etiology of these episodes is unclear, but seizures (perhaps precipitated by the meningitis) should be further screened for.        Impression:  Right arm numbness traveling to face followed by R arm and face paralysis. Same symptoms sometimes occurring in L side. Occasional jolts of the whole body. R/o seizure     Plan:  [] started on IVIG 12/21 for total of 3 days and dc on 12/23  [] Monitor on vEEG for event capture off of AEDs.  [] vEEG showed Mild left temporal focal cerebral dysfunction.   [] LP performed 12/20 at bedside, pt tolerated well, sent AIE and MS workup.   [] Lorazepam 2mg IV PRN for seizure activity lasting >3-5mins, >2x/hr, >3x/day, or if GTC , repeat after 1 minute (max dose 0.1 mg/kg)   [] Vimpat  mg is seizures resistant to Ativan  [] Continue home Lexapro 5 mg daily  [] Submitted request for prior [facility] hospitalization records.   [] neuro checks, NPO until swallow evaluation, seizure, fall & aspiration precautions  [] DVT ppx: Lovenox  [] Given concern for seizure, advise patient to not drive, operate heavy machinery, avoid heights, pools, bathtubs, locked doors until cleared by further follow up outpatient by neurology.   [] Please note: if patient has a convulsion, please document length of episode, specifically what patient was doing paying attention to eye opening vs closure, gaze deviation, shaking of extremities, tongue bite, urinary incontinence, any derangement of vital signs. Generalized motor seizures can have dilated and unreactive pupils, absent oculocephalic reflexes (no dolls eyes), and open eyelids (99% of cases). Head turning from sided to side, pelvic thrusting, bicycling movements, hand waving are NOT manifestations of generalized motor seizures.    Case discussed with Dr. Lincoln. Recommendations finalized once signed by attending.    38 year old right handed lady with a PMH of Hashimoto's and hemochromatosis who presented to the EMU for elective admission for event capture.  Intermittent right arm and face paralysis and slurred speech on 10/23 which she was told was d/t viral meningitis, now with episodes of tingling and sometimes a feeling of paralysis on one side or both sides. She has presented to the hospital multiple times, and workup has been grossly unremarkable. She was having headaches, but these have since resolved. The etiology of these episodes is unclear, but seizures (perhaps precipitated by the meningitis) should be further screened for.        Impression:  Right arm numbness traveling to face followed by R arm and face paralysis. Same symptoms sometimes occurring in L side. Occasional jolts of the whole body. R/o seizure     Plan:  [] started on IVIG 12/21 for total of 3 days and dc on 12/23  [] Monitor on vEEG for event capture off of AEDs.  [] vEEG showed Mild left temporal focal cerebral dysfunction.   [] LP performed 12/20 at bedside, pt tolerated well, sent AIE and MS workup.   [] Lorazepam 2mg IV PRN for seizure activity lasting >3-5mins, >2x/hr, >3x/day, or if GTC , repeat after 1 minute (max dose 0.1 mg/kg)   [] Vimpat  mg is seizures resistant to Ativan  [] Continue home Lexapro 5 mg daily  [] Submitted request for prior [facility] hospitalization records.   [] neuro checks, NPO until swallow evaluation, seizure, fall & aspiration precautions  [] DVT ppx: Lovenox  [] Given concern for seizure, advise patient to not drive, operate heavy machinery, avoid heights, pools, bathtubs, locked doors until cleared by further follow up outpatient by neurology.   [] Please note: if patient has a convulsion, please document length of episode, specifically what patient was doing paying attention to eye opening vs closure, gaze deviation, shaking of extremities, tongue bite, urinary incontinence, any derangement of vital signs. Generalized motor seizures can have dilated and unreactive pupils, absent oculocephalic reflexes (no dolls eyes), and open eyelids (99% of cases). Head turning from sided to side, pelvic thrusting, bicycling movements, hand waving are NOT manifestations of generalized motor seizures.    Case discussed with Dr. Lincoln. Recommendations finalized once signed by attending.    38 year old right handed lady with a PMH of Hashimoto's and hemochromatosis who presented to the EMU for elective admission for event capture.  Intermittent right arm and face paralysis and slurred speech on 10/23 which she was told was d/t viral meningitis, now with episodes of tingling and sometimes a feeling of paralysis on one side or both sides. She has presented to the hospital multiple times, and workup has been grossly unremarkable. She was having headaches, but these have since resolved. The etiology of these episodes is unclear, but seizures (perhaps precipitated by the meningitis) should be further screened for.        Impression:  Right arm numbness traveling to face followed by R arm and face paralysis. Same symptoms sometimes occurring in L side. Occasional jolts of the whole body. R/o seizure     Plan:  [] started on IVIG 12/21 for total of 3 days and dc on 12/23  [] Monitor on vEEG for event capture off of AEDs.  [] vEEG showed Mild left temporal focal cerebral dysfunction.   [] LP performed 12/20 at bedside, pt tolerated well, sent AIE and MS workup.   [] Lorazepam 2mg IV PRN for seizure activity lasting >3-5mins, >2x/hr, >3x/day, or if GTC , repeat after 1 minute (max dose 0.1 mg/kg)   [] Vimpat  mg is seizures resistant to Ativan  [] Continue home Lexapro 5 mg daily  [] Submitted request for prior [facility] hospitalization records.   [] DVT ppx: Lovenox  [] Given concern for seizure, advise patient to not drive, operate heavy machinery, avoid heights, pools, bathtubs, locked doors until cleared by further follow up outpatient by neurology.   [] Please note: if patient has a convulsion, please document length of episode, specifically what patient was doing paying attention to eye opening vs closure, gaze deviation, shaking of extremities, tongue bite, urinary incontinence, any derangement of vital signs. Generalized motor seizures can have dilated and unreactive pupils, absent oculocephalic reflexes (no dolls eyes), and open eyelids (99% of cases). Head turning from sided to side, pelvic thrusting, bicycling movements, hand waving are NOT manifestations of generalized motor seizures.    Case discussed with Dr. Lincoln. Recommendations finalized once signed by attending.    38 year old right handed lady with a PMH of Hashimoto's and hemochromatosis who presented to the EMU for elective admission for event capture.  Intermittent right arm and face paralysis and slurred speech on 10/23 which she was told was d/t viral meningitis, now with episodes of tingling and sometimes a feeling of paralysis on one side or both sides. She has presented to the hospital multiple times, and workup has been grossly unremarkable. She was having headaches, but these have since resolved. The etiology of these episodes is unclear, but seizures (perhaps precipitated by the meningitis) should be further screened for.        Impression:  Right arm numbness traveling to face followed by R arm and face paralysis. Same symptoms sometimes occurring in L side. Occasional jolts of the whole body. R/o seizure     Plan:  [] started on IVIG 12/21 for total of 3 days and dc on 12/23  [] Monitor on vEEG for event capture off of AEDs.  [] vEEG showed Mild left temporal focal cerebral dysfunction.   [] LP performed 12/20 at bedside, pt tolerated well, sent AIE and MS workup.   [] Lorazepam 2mg IV PRN for seizure activity lasting >3-5mins, >2x/hr, >3x/day, or if GTC , repeat after 1 minute (max dose 0.1 mg/kg)   [] Vimpat  mg is seizures resistant to Ativan  [] Continue home Lexapro 5 mg daily  [] Submitted request for prior [facility] hospitalization records.   [] DVT ppx: Lovenox  [] Given concern for seizure, advise patient to not drive, operate heavy machinery, avoid heights, pools, bathtubs, locked doors until cleared by further follow up outpatient by neurology.   [] Please note: if patient has a convulsion, please document length of episode, specifically what patient was doing paying attention to eye opening vs closure, gaze deviation, shaking of extremities, tongue bite, urinary incontinence, any derangement of vital signs. Generalized motor seizures can have dilated and unreactive pupils, absent oculocephalic reflexes (no dolls eyes), and open eyelids (99% of cases). Head turning from sided to side, pelvic thrusting, bicycling movements, hand waving are NOT manifestations of generalized motor seizures.    Case discussed with Dr. Lincoln. Recommendations finalized once signed by attending.

## 2023-12-22 NOTE — PROGRESS NOTE ADULT - SUBJECTIVE AND OBJECTIVE BOX
THE PATIENT WAS SEEN AND EXAMINED BY ME WITH THE HOUSESTAFF DURING MORNING ROUNDS.   HPI:  38 year old right handed woman with a PMH of Hashimoto's and hemochromatosis who presents to the EMU for elective admission for event capture.    On 10/16/23, she was feeling unwell, run down and off balance. She had an episode where she felt her ears were popping, dizzy, and lightheaded. Her eyes were sensitive to light. These symptoms came on rapidly at the time after which jd developed paralysis from her right hand up to the arm and face, with slurred speech, followed by issues formulating thoughts. She went to [facility] where she was discovered to have viral meningitis. She was admitted and treated with antiviral and sent home.  ?  After discharge patient has had many similar episodes of paralysis from her right hand up to the arm and face. When the episodes are severe they effect her speech. She presented to [facility] twice for these episodes and workup was unremarkable. At that time the migraine cocktail helped. She was also admitted to [facility] with severe headaches and readmitted a few days later with chest pain, thought to be from the Sumatriptan. Patient reports she has not had a headache in over a week. She is no longer taking migraine medications. She has no prior hx of migraines.?  ?  She continues to experience recurrent episodes of paralysis; can be on either side and can be on both sides. The episodes can range from 10 minutes to 2.5 hours; sometimes it just affects her hand/arm and other times it can be the whole side of her body. When it comes on it's a tingling sensation that she can feel spreading; within minutes feels the full paralysis. She has at least one episode a day; most frequently at night time. She also occasionally feels weakness on one side of the body. During one episode, she endorsed shaking/convulsions. She describes the shaking as "jolts" in her body during which she retains awareness. Currently patient reports fatigue. She denies current headache, nausea, dizziness, vision changes, hearing changes, focal numbness or focal weakness.    ROS: Otherwise negative.     SUBJECTIVE: No events overnight.  No new neurologic complaints.      acetaminophen     Tablet .. 650 milliGRAM(s) Oral every 6 hours PRN  acetaminophen     Tablet .. 650 milliGRAM(s) Oral daily  enoxaparin Injectable 40 milliGRAM(s) SubCutaneous every 24 hours  escitalopram 5 milliGRAM(s) Oral daily  immune   globulin 10% (GAMMAGARD) IVPB 35 Gram(s) IV Intermittent daily  lacosamide 200 milliGRAM(s) Oral once PRN  LORazepam   Injectable 2 milliGRAM(s) IV Push once PRN  methylPREDNISolone sodium succinate IVPB 1000 milliGRAM(s) IV Intermittent daily  pantoprazole  Injectable 40 milliGRAM(s) IV Push daily      General:  Constitutional: Female, appears stated age, nontoxic, not in distress,    Head: Normocephalic;   Eyes: clear sclera;   Extremities: No cyanosis;   Resp: breathing comfortably     Neurological (>12):  MS: Awake, alert.  Oriented person place situation. Follows commands. Attends to examiner  Language: Speech is hypophonic, clear, fluent, good repetition,  comprehension, registration of words.  CNs: PERRL (R 3mm, L 3mm). VFF. EOMI. No disconjugate gaze, skew. V1-3 intact LT, No facial asymmetry b/l. Hearing grossly normal b/l. Tongue midline.     Motor - Normal bulk and tone throughout. No pronator drift.    L/R (out of 5)       Deltoid  5/5    Biceps   5/5      Triceps  5/5         Wrist Extension 5/5   Wrist Flexion  5/5   Interossei 5/5     5/5   L/R (out of 5)       Hip Flexion  5/5    Hip Extension  5/5  Knee Extension  5/5  Dorsiflexion  5/5      Plantar Flexion 5/5     Sensation: Intact to LT b/l. Cortical: Extinction on DSS (neglect): none  Reflexes L/R:  Biceps(C5) 2/2  BR(C6) 2/2   Triceps(C7)  2/2 Patellar(L4)   2/2   Toes: mute  Coordination: No dysmetria to FTN b/l UE  Gait: Normal Romberg. No postural instability. Normal stance.    LABS:                        12.9   5.84  )-----------( 212      ( 21 Dec 2023 06:35 )             37.8    12-21    139  |  106  |  10  ----------------------------<  108<H>  4.8   |  21<L>  |  0.68    Ca    9.1      21 Dec 2023 06:35    TPro  x   /  Alb  4360  /  TBili  x   /  DBili  x   /  AST  x   /  ALT  x   /  AlkPhos  x   12-20      (12/21/2023)  EEG SUMMARY:  Abnormal EEG in the awake, drowsy and asleep states.  Rare subtle bilateral temporal focal slowing.  No epileptiform pattern or seizures were recorded.    Mild bilateral temporal focal cerebral dysfunction can be structural or functional in etiology. THE PATIENT WAS SEEN AND EXAMINED BY ME WITH THE HOUSESTAFF DURING MORNING ROUNDS.   HPI:  38 year old right handed woman with a PMH of Hashimoto's and hemochromatosis who presents to the EMU for elective admission for event capture.    On 10/16/23, she was feeling unwell, run down and off balance. She had an episode where she felt her ears were popping, dizzy, and lightheaded. Her eyes were sensitive to light. These symptoms came on rapidly at the time after which jd developed paralysis from her right hand up to the arm and face, with slurred speech, followed by issues formulating thoughts. She went to [facility] where she was discovered to have viral meningitis. She was admitted and treated with antiviral and sent home.  ?  After discharge patient has had many similar episodes of paralysis from her right hand up to the arm and face. When the episodes are severe they effect her speech. She presented to [facility] twice for these episodes and workup was unremarkable. At that time the migraine cocktail helped. She was also admitted to [facility] with severe headaches and readmitted a few days later with chest pain, thought to be from the Sumatriptan. Patient reports she has not had a headache in over a week. She is no longer taking migraine medications. She has no prior hx of migraines.?  ?  She continues to experience recurrent episodes of paralysis; can be on either side and can be on both sides. The episodes can range from 10 minutes to 2.5 hours; sometimes it just affects her hand/arm and other times it can be the whole side of her body. When it comes on it's a tingling sensation that she can feel spreading; within minutes feels the full paralysis. She has at least one episode a day; most frequently at night time. She also occasionally feels weakness on one side of the body. During one episode, she endorsed shaking/convulsions. She describes the shaking as "jolts" in her body during which she retains awareness. Currently patient reports fatigue. She denies current headache, nausea, dizziness, vision changes, hearing changes, focal numbness or focal weakness.    ROS: Otherwise negative.     SUBJECTIVE: No events overnight.  No new neurologic complaints.      acetaminophen     Tablet .. 650 milliGRAM(s) Oral every 6 hours PRN  acetaminophen     Tablet .. 650 milliGRAM(s) Oral daily  enoxaparin Injectable 40 milliGRAM(s) SubCutaneous every 24 hours  escitalopram 5 milliGRAM(s) Oral daily  immune   globulin 10% (GAMMAGARD) IVPB 35 Gram(s) IV Intermittent daily  lacosamide 200 milliGRAM(s) Oral once PRN  LORazepam   Injectable 2 milliGRAM(s) IV Push once PRN  methylPREDNISolone sodium succinate IVPB 1000 milliGRAM(s) IV Intermittent daily  pantoprazole  Injectable 40 milliGRAM(s) IV Push daily      General:  Constitutional: Female, appears stated age, nontoxic, not in distress,    Head: Normocephalic;   Eyes: clear sclera;   Extremities: No cyanosis;   Resp: breathing comfortably     Neurological (>12):  MS: Awake, alert.  Oriented person place situation. Follows commands. Attends to examiner  Language: Speech is hypophonic, clear, fluent, good repetition,  comprehension, registration of words.  CNs: PERRL (R 3mm, L 3mm). VFF. EOMI. No disconjugate gaze, skew. V1-3 intact LT, No facial asymmetry b/l. Hearing grossly normal b/l. Tongue midline.     Motor - Normal bulk and tone throughout. No pronator drift.    L/R (out of 5)       Deltoid  5/5    Biceps   5/5      Triceps  5/5         Wrist Extension 5/5   Wrist Flexion  5/5   Interossei 5/5     5/5   L/R (out of 5)       Hip Flexion  5/5    Hip Extension  5/5  Knee Extension  5/5  Dorsiflexion  5/5      Plantar Flexion 5/5     Sensation: Intact to LT b/l. Cortical: Extinction on DSS (neglect): none  Reflexes L/R:  Biceps(C5) 2/2  BR(C6) 2/2   Triceps(C7)  2/2 Patellar(L4)   2/2   Toes: mute  Coordination: No dysmetria to FTN b/l UE  Gait: Normal Romberg. No postural instability. Normal stance.    LABS:                        12.9   5.84  )-----------( 212      ( 21 Dec 2023 06:35 )             37.8    12-21    139  |  106  |  10  ----------------------------<  108<H>  4.8   |  21<L>  |  0.68    Ca    9.1      21 Dec 2023 06:35    TPro  x   /  Alb  4360  /  TBili  x   /  DBili  x   /  AST  x   /  ALT  x   /  AlkPhos  x   12-20      (12/21/2023)  EEG SUMMARY:  Abnormal EEG in the awake, drowsy and asleep states.  Rare subtle bilateral temporal focal slowing.  No epileptiform pattern or seizures were recorded.    Mild bilateral temporal focal cerebral dysfunction can be structural or functional in etiology.

## 2023-12-23 VITALS
HEART RATE: 92 BPM | SYSTOLIC BLOOD PRESSURE: 128 MMHG | DIASTOLIC BLOOD PRESSURE: 89 MMHG | OXYGEN SATURATION: 97 % | RESPIRATION RATE: 18 BRPM | TEMPERATURE: 98 F

## 2023-12-23 PROCEDURE — 81003 URINALYSIS AUTO W/O SCOPE: CPT

## 2023-12-23 PROCEDURE — 82947 ASSAY GLUCOSE BLOOD QUANT: CPT

## 2023-12-23 PROCEDURE — 36415 COLL VENOUS BLD VENIPUNCTURE: CPT

## 2023-12-23 PROCEDURE — 83615 LACTATE (LD) (LDH) ENZYME: CPT

## 2023-12-23 PROCEDURE — 82784 ASSAY IGA/IGD/IGG/IGM EACH: CPT

## 2023-12-23 PROCEDURE — 99239 HOSP IP/OBS DSCHRG MGMT >30: CPT

## 2023-12-23 PROCEDURE — 86341 ISLET CELL ANTIBODY: CPT

## 2023-12-23 PROCEDURE — 84100 ASSAY OF PHOSPHORUS: CPT

## 2023-12-23 PROCEDURE — 80048 BASIC METABOLIC PNL TOTAL CA: CPT

## 2023-12-23 PROCEDURE — 83735 ASSAY OF MAGNESIUM: CPT

## 2023-12-23 PROCEDURE — 86362 MOG-IGG1 ANTB CBA EACH: CPT

## 2023-12-23 PROCEDURE — 83873 ASSAY OF CSF PROTEIN: CPT

## 2023-12-23 PROCEDURE — 86255 FLUORESCENT ANTIBODY SCREEN: CPT

## 2023-12-23 PROCEDURE — 95713 VEEG 2-12 HR CONT MNTR: CPT

## 2023-12-23 PROCEDURE — 82042 OTHER SOURCE ALBUMIN QUAN EA: CPT

## 2023-12-23 PROCEDURE — 89051 BODY FLUID CELL COUNT: CPT

## 2023-12-23 PROCEDURE — 95700 EEG CONT REC W/VID EEG TECH: CPT

## 2023-12-23 PROCEDURE — 84157 ASSAY OF PROTEIN OTHER: CPT

## 2023-12-23 PROCEDURE — 82040 ASSAY OF SERUM ALBUMIN: CPT

## 2023-12-23 PROCEDURE — 85027 COMPLETE CBC AUTOMATED: CPT

## 2023-12-23 PROCEDURE — 82945 GLUCOSE OTHER FLUID: CPT

## 2023-12-23 PROCEDURE — 95716 VEEG EA 12-26HR CONT MNTR: CPT

## 2023-12-23 PROCEDURE — 83916 OLIGOCLONAL BANDS: CPT

## 2023-12-23 RX ADMIN — Medication 650 MILLIGRAM(S): at 08:59

## 2023-12-23 RX ADMIN — Medication 25 MILLIGRAM(S): at 08:29

## 2023-12-23 RX ADMIN — PANTOPRAZOLE SODIUM 40 MILLIGRAM(S): 20 TABLET, DELAYED RELEASE ORAL at 05:18

## 2023-12-23 RX ADMIN — Medication 50 MILLIGRAM(S): at 05:27

## 2023-12-23 RX ADMIN — IMMUNE GLOBULIN (HUMAN) 58.33 GRAM(S): 10 INJECTION INTRAVENOUS; SUBCUTANEOUS at 08:55

## 2023-12-23 RX ADMIN — ESCITALOPRAM OXALATE 5 MILLIGRAM(S): 10 TABLET, FILM COATED ORAL at 13:56

## 2023-12-23 RX ADMIN — Medication 650 MILLIGRAM(S): at 08:29

## 2023-12-23 NOTE — PROGRESS NOTE ADULT - ASSESSMENT
38 year old right handed lady with a PMH of Hashimoto's and hemochromatosis who presented to the EMU for elective admission for event capture.  Intermittent right arm and face paralysis and slurred speech on 10/23 which she was told was d/t viral meningitis, now with episodes of tingling and sometimes a feeling of paralysis on one side or both sides. She has presented to the hospital multiple times, and workup has been grossly unremarkable. She was having headaches, but these have since resolved. The etiology of these episodes is unclear, but seizures (perhaps precipitated by the meningitis) should be further screened for.        Impression:  Right arm numbness traveling to face followed by R arm and face paralysis. Same symptoms sometimes occurring in L side. Occasional jolts of the whole body. R/o seizure     Plan:  [] started on IVIG 12/21 for total of 3 days and dc on 12/23  [] Monitor on vEEG for event capture off of AEDs.  [] vEEG showed Mild left temporal focal cerebral dysfunction.   [] LP performed 12/20 at bedside, pt tolerated well, sent AIE and MS workup.   [] Lorazepam 2mg IV PRN for seizure activity lasting >3-5mins, >2x/hr, >3x/day, or if GTC , repeat after 1 minute (max dose 0.1 mg/kg)   [] Vimpat  mg is seizures resistant to Ativan  [] Continue home Lexapro 5 mg daily  [] Submitted request for prior [facility] hospitalization records.   [] DVT ppx: Lovenox  [] Given concern for seizure, advise patient to not drive, operate heavy machinery, avoid heights, pools, bathtubs, locked doors until cleared by further follow up outpatient by neurology.   [] Please note: if patient has a convulsion, please document length of episode, specifically what patient was doing paying attention to eye opening vs closure, gaze deviation, shaking of extremities, tongue bite, urinary incontinence, any derangement of vital signs. Generalized motor seizures can have dilated and unreactive pupils, absent oculocephalic reflexes (no dolls eyes), and open eyelids (99% of cases). Head turning from sided to side, pelvic thrusting, bicycling movements, hand waving are NOT manifestations of generalized motor seizures.    Case seen and discussed with attending Dr. Wood. Recommendations finalized once signed by attending.        38 year old right handed lady with a PMH of Hashimoto's and hemochromatosis who presented to the EMU for elective admission for event capture.  Intermittent right arm and face paralysis and slurred speech on 10/23 which she was told was d/t viral meningitis, now with episodes of tingling and sometimes a feeling of paralysis on one side or both sides. She has presented to the hospital multiple times, and workup has been grossly unremarkable. She was having headaches, but these have since resolved. The etiology of these episodes is unclear, but seizures (perhaps precipitated by the meningitis) should be further screened for.        Impression:  Right arm numbness traveling to face followed by R arm and face paralysis. Same symptoms sometimes occurring in L side. Occasional jolts of the whole body. R/o seizure     Plan:  [] started on IVIG 12/21 for total of 3 days and dc on 12/23  [] Monitor on vEEG for event capture off of AEDs.  [] vEEG showed Mild left temporal focal cerebral dysfunction.   [] LP performed 12/20 at bedside, pt tolerated well, sent AIE and MS workup.   [] Lorazepam 2mg IV PRN for seizure activity lasting >3-5mins, >2x/hr, >3x/day, or if GTC , repeat after 1 minute (max dose 0.1 mg/kg)   [] Vimpat  mg is seizures resistant to Ativan  [] Continue home Lexapro 5 mg daily  [] Submitted request for prior [facility] hospitalization records.   [] DVT ppx: Lovenox  [] Given concern for seizure, advise patient to not drive, operate heavy machinery, avoid heights, pools, bathtubs, locked doors until cleared by further follow up outpatient by neurology.   [] Please note: if patient has a convulsion, please document length of episode, specifically what patient was doing paying attention to eye opening vs closure, gaze deviation, shaking of extremities, tongue bite, urinary incontinence, any derangement of vital signs. Generalized motor seizures can have dilated and unreactive pupils, absent oculocephalic reflexes (no dolls eyes), and open eyelids (99% of cases). Head turning from sided to side, pelvic thrusting, bicycling movements, hand waving are NOT manifestations of generalized motor seizures.    Case seen and discussed with attending Dr. Wood. Recommendations finalized once signed by attending.

## 2023-12-23 NOTE — PROGRESS NOTE ADULT - SUBJECTIVE AND OBJECTIVE BOX
Neurology - Consult Note    -  Spectra: [phone] ([facility]), [phone] (J)  -    THE PATIENT WAS SEEN AND EXAMINED BY ME WITH THE HOUSESTAFF DURING MORNING ROUNDS.   HPI:  38 year old right handed woman with a PMH of Hashimoto's and hemochromatosis who presents to the EMU for elective admission for event capture.    On 10/16/23, she was feeling unwell, run down and off balance. She had an episode where she felt her ears were popping, dizzy, and lightheaded. Her eyes were sensitive to light. These symptoms came on rapidly at the time after which jd developed paralysis from her right hand up to the arm and face, with slurred speech, followed by issues formulating thoughts. She went to [facility] where she was discovered to have viral meningitis. She was admitted and treated with antiviral and sent home.  ?  After discharge patient has had many similar episodes of paralysis from her right hand up to the arm and face. When the episodes are severe they effect her speech. She presented to [facility] twice for these episodes and workup was unremarkable. At that time the migraine cocktail helped. She was also admitted to [facility] with severe headaches and readmitted a few days later with chest pain, thought to be from the Sumatriptan. Patient reports she has not had a headache in over a week. She is no longer taking migraine medications. She has no prior hx of migraines.?  ?  She continues to experience recurrent episodes of paralysis; can be on either side and can be on both sides. The episodes can range from 10 minutes to 2.5 hours; sometimes it just affects her hand/arm and other times it can be the whole side of her body. When it comes on it's a tingling sensation that she can feel spreading; within minutes feels the full paralysis. She has at least one episode a day; most frequently at night time. She also occasionally feels weakness on one side of the body. During one episode, she endorsed shaking/convulsions. She describes the shaking as "jolts" in her body during which she retains awareness. Currently patient reports fatigue. She denies current headache, nausea, dizziness, vision changes, hearing changes, focal numbness or focal weakness.    ROS: Otherwise negative.     SUBJECTIVE: No events overnight.  No new neurologic complaints.      Allergies:  penicillin (Short breath)      PMHx/PSHx/Family Hx: As above, otherwise see below   No pertinent past medical history    Hemochromatosis    H/O Hashimoto thyroiditis    Hypothyroidism    Viral meningitis        Social Hx:  No current use of tobacco, alcohol, or illicit drugs    Medications:  MEDICATIONS  (STANDING):  enoxaparin Injectable 40 milliGRAM(s) SubCutaneous every 24 hours  escitalopram 5 milliGRAM(s) Oral daily  pantoprazole  Injectable 40 milliGRAM(s) IV Push daily    MEDICATIONS  (PRN):  acetaminophen     Tablet .. 650 milliGRAM(s) Oral every 6 hours PRN Temp greater or equal to 38C (100.4F), Mild Pain (1 - 3)  lacosamide 200 milliGRAM(s) Oral once PRN Seizure resistant to Ativan  LORazepam   Injectable 2 milliGRAM(s) IV Push once PRN seizure activity greater then 3min with vital sign changes.      Vitals:  T(C): 36.7 (12-23-23 @ 08:46), Max: 37.1 (12-23-23 @ 01:00)  HR: 94 (12-23-23 @ 08:46) (70 - 94)  BP: 108/71 (12-23-23 @ 08:46) (92/64 - 115/73)  RR: 18 (12-23-23 @ 08:46) (18 - 20)  SpO2: 98% (12-23-23 @ 08:46) (96% - 99%)      General:  Constitutional: Female, appears stated age, nontoxic, not in distress,    Head: Normocephalic;   Eyes: clear sclera;   Extremities: No cyanosis;   Resp: breathing comfortably     Neurological (>12):  MS: Awake, alert.  Oriented person place situation. Follows commands. Attends to examiner  Language: Speech is hypophonic, clear, fluent, good repetition,  comprehension, registration of words.  CNs: PERRL (R 3mm, L 3mm). VFF. EOMI. No disconjugate gaze, skew. V1-3 intact LT, No facial asymmetry b/l. Hearing grossly normal b/l. Tongue midline.     Motor - Normal bulk and tone throughout. No pronator drift.    L/R (out of 5)       Deltoid  5/5    Biceps   5/5      Triceps  5/5         Wrist Extension 5/5   Wrist Flexion  5/5   Interossei 5/5     5/5   L/R (out of 5)       Hip Flexion  5/5    Hip Extension  5/5  Knee Extension  5/5  Dorsiflexion  5/5      Plantar Flexion 5/5     Sensation: Intact to LT b/l. Cortical: Extinction on DSS (neglect): none  Reflexes L/R:  Biceps(C5) 2/2  BR(C6) 2/2   Triceps(C7)  2/2 Patellar(L4)   2/2   Toes: mute  Coordination: No dysmetria to FTN b/l UE  Gait: Normal Romberg. No postural instability. Normal stance.      Labs:          CAPILLARY BLOOD GLUCOSE              CSF:    Total Nucleated Cell Count, CSF: 11 /uL (12-20-23 @ 14:52)  RBC Count - Spinal Fluid: 7 /uL (12-20-23 @ 14:52)          Protein, CSF: 31 mg/dL (12-20-23 @ 14:52)        Radiology:    (12/21/2023)  EEG SUMMARY:  Abnormal EEG in the awake, drowsy and asleep states.  Rare subtle bilateral temporal focal slowing.  No epileptiform pattern or seizures were recorded.    Mild bilateral temporal focal cerebral dysfunction can be structural or functional in etiology.   Neurology - Consult Note    -  Spectra: [phone] ([facility]), [phone] (J)  -    THE PATIENT WAS SEEN AND EXAMINED BY ME WITH THE HOUSESTAFF DURING MORNING ROUNDS.   HPI:  38 year old right handed woman with a PMH of Hashimoto's and hemochromatosis who presents to the EMU for elective admission for event capture.    On 10/16/23, she was feeling unwell, run down and off balance. She had an episode where she felt her ears were popping, dizzy, and lightheaded. Her eyes were sensitive to light. These symptoms came on rapidly at the time after which jd developed paralysis from her right hand up to the arm and face, with slurred speech, followed by issues formulating thoughts. She went to [facility] where she was discovered to have viral meningitis. She was admitted and treated with antiviral and sent home.  ?  After discharge patient has had many similar episodes of paralysis from her right hand up to the arm and face. When the episodes are severe they effect her speech. She presented to [facility] twice for these episodes and workup was unremarkable. At that time the migraine cocktail helped. She was also admitted to [facility] with severe headaches and readmitted a few days later with chest pain, thought to be from the Sumatriptan. Patient reports she has not had a headache in over a week. She is no longer taking migraine medications. She has no prior hx of migraines.?  ?  She continues to experience recurrent episodes of paralysis; can be on either side and can be on both sides. The episodes can range from 10 minutes to 2.5 hours; sometimes it just affects her hand/arm and other times it can be the whole side of her body. When it comes on it's a tingling sensation that she can feel spreading; within minutes feels the full paralysis. She has at least one episode a day; most frequently at night time. She also occasionally feels weakness on one side of the body. During one episode, she endorsed shaking/convulsions. She describes the shaking as "jolts" in her body during which she retains awareness. Currently patient reports fatigue. She denies current headache, nausea, dizziness, vision changes, hearing changes, focal numbness or focal weakness.    ROS: Otherwise negative.     SUBJECTIVE: No events overnight.  No new neurologic complaints.      Allergies:  penicillin (Short breath)      PMHx/PSHx/Family Hx: As above, otherwise see below   No pertinent past medical history    Hemochromatosis    H/O Hashimoto thyroiditis    Hypothyroidism    Viral meningitis        Social Hx:  No current use of tobacco, alcohol, or illicit drugs    Medications:  MEDICATIONS  (STANDING):  enoxaparin Injectable 40 milliGRAM(s) SubCutaneous every 24 hours  escitalopram 5 milliGRAM(s) Oral daily  pantoprazole  Injectable 40 milliGRAM(s) IV Push daily    MEDICATIONS  (PRN):  acetaminophen     Tablet .. 650 milliGRAM(s) Oral every 6 hours PRN Temp greater or equal to 38C (100.4F), Mild Pain (1 - 3)  lacosamide 200 milliGRAM(s) Oral once PRN Seizure resistant to Ativan  LORazepam   Injectable 2 milliGRAM(s) IV Push once PRN seizure activity greater then 3min with vital sign changes.      Vitals:  T(C): 36.7 (12-23-23 @ 08:46), Max: 37.1 (12-23-23 @ 01:00)  HR: 94 (12-23-23 @ 08:46) (70 - 94)  BP: 108/71 (12-23-23 @ 08:46) (92/64 - 115/73)  RR: 18 (12-23-23 @ 08:46) (18 - 20)  SpO2: 98% (12-23-23 @ 08:46) (96% - 99%)      General:  Constitutional: Female, appears stated age, nontoxic, not in distress,    Head: Normocephalic;   Eyes: clear sclera;   Extremities: No cyanosis;   Resp: breathing comfortably     Neurological (>12):  MS: Awake, alert.  Oriented person place situation. Follows commands. Attends to examiner  Language: Speech is hypophonic, clear, fluent, good repetition,  comprehension, registration of words.  CNs: PERRL (R 3mm, L 3mm). VFF. EOMI. No disconjugate gaze, skew. V1-3 intact LT, No facial asymmetry b/l. Hearing grossly normal b/l. Tongue midline.     Motor - Normal bulk and tone throughout. No pronator drift.    L/R (out of 5)       Deltoid  5/5    Biceps   5/5      Triceps  5/5         Wrist Extension 5/5   Wrist Flexion  5/5   Interossei 5/5     5/5   L/R (out of 5)       Hip Flexion  5/5    Hip Extension  5/5  Knee Extension  5/5  Dorsiflexion  5/5      Plantar Flexion 5/5     Sensation: Intact to LT b/l. Cortical: Extinction on DSS (neglect): none  Reflexes L/R:  Biceps(C5) 2/2  BR(C6) 2/2   Triceps(C7)  2/2 Patellar(L4)   2/2   Toes: mute  Coordination: No dysmetria to FTN b/l UE  Gait: Normal Romberg. No postural instability. Normal stance.      Labs:          CAPILLARY BLOOD GLUCOSE              CSF:    Total Nucleated Cell Count, CSF: 11 /uL (12-20-23 @ 14:52)  RBC Count - Spinal Fluid: 7 /uL (12-20-23 @ 14:52)          Protein, CSF: 31 mg/dL (12-20-23 @ 14:52)        Radiology:    (12/21/2023)  EEG SUMMARY:  Abnormal EEG in the awake, drowsy and asleep states.  Rare subtle bilateral temporal focal slowing.  No epileptiform pattern or seizures were recorded.    Mild bilateral temporal focal cerebral dysfunction can be structural or functional in etiology.

## 2023-12-23 NOTE — PROGRESS NOTE ADULT - ATTENDING COMMENTS
Impression:  Right arm numbness traveling to face followed by R arm and face paralysis. Same symptoms sometimes occurring in L side. Occasional jolts of the whole body. EEG stopped.    Plan:  [] started on IVIG 12/21 for total of 3 days and dc today  [] LP performed 12/20 at bedside, pt tolerated well, sent AIE and MS workup.     OK to DC today and will f/u with Dr. Lincoln - antoni driving and seizure safety discussed.

## 2023-12-27 LAB
OLIGOCLONAL BANDS CSF ELPH-IMP: SIGNIFICANT CHANGE UP
OLIGOCLONAL BANDS CSF ELPH-IMP: SIGNIFICANT CHANGE UP

## 2023-12-28 LAB
AMPA-R AB CBA, CSF: NEGATIVE — SIGNIFICANT CHANGE UP
AMPA-R AB CBA, CSF: NEGATIVE — SIGNIFICANT CHANGE UP
AMPHIPHYSIN AB TITR CSF: NEGATIVE — SIGNIFICANT CHANGE UP
AMPHIPHYSIN AB TITR CSF: NEGATIVE — SIGNIFICANT CHANGE UP
CASPR2-IGG CBA, CSF: NEGATIVE — SIGNIFICANT CHANGE UP
CASPR2-IGG CBA, CSF: NEGATIVE — SIGNIFICANT CHANGE UP
CV2 IGG TITR CSF: NEGATIVE — SIGNIFICANT CHANGE UP
CV2 IGG TITR CSF: NEGATIVE — SIGNIFICANT CHANGE UP
DPPX ANTIBODY IFA, CSF: NEGATIVE — SIGNIFICANT CHANGE UP
DPPX ANTIBODY IFA, CSF: NEGATIVE — SIGNIFICANT CHANGE UP
GABA-B-R AB CBA, CSF: NEGATIVE — SIGNIFICANT CHANGE UP
GABA-B-R AB CBA, CSF: NEGATIVE — SIGNIFICANT CHANGE UP
GAD65 AB CSF-SCNC: 0 NMOL/L — SIGNIFICANT CHANGE UP
GAD65 AB CSF-SCNC: 0 NMOL/L — SIGNIFICANT CHANGE UP
GFAP IFA, CSF: NEGATIVE — SIGNIFICANT CHANGE UP
GFAP IFA, CSF: NEGATIVE — SIGNIFICANT CHANGE UP
GLIAL NUC TYPE 1 AB TITR CSF: NEGATIVE — SIGNIFICANT CHANGE UP
GLIAL NUC TYPE 1 AB TITR CSF: NEGATIVE — SIGNIFICANT CHANGE UP
HU1 AB TITR CSF IF: NEGATIVE — SIGNIFICANT CHANGE UP
HU1 AB TITR CSF IF: NEGATIVE — SIGNIFICANT CHANGE UP
HU2 AB TITR CSF IF: NEGATIVE — SIGNIFICANT CHANGE UP
HU2 AB TITR CSF IF: NEGATIVE — SIGNIFICANT CHANGE UP
HU3 AB TITR CSF: NEGATIVE — SIGNIFICANT CHANGE UP
HU3 AB TITR CSF: NEGATIVE — SIGNIFICANT CHANGE UP
IFA NOTES: SIGNIFICANT CHANGE UP
IFA NOTES: SIGNIFICANT CHANGE UP
IGLON5 IFA, CSF: NEGATIVE — SIGNIFICANT CHANGE UP
IGLON5 IFA, CSF: NEGATIVE — SIGNIFICANT CHANGE UP
IMMUNOLOGIST REVIEW: SIGNIFICANT CHANGE UP
IMMUNOLOGIST REVIEW: SIGNIFICANT CHANGE UP
LGI1-IGG CBA, CSF: NEGATIVE — SIGNIFICANT CHANGE UP
LGI1-IGG CBA, CSF: NEGATIVE — SIGNIFICANT CHANGE UP
MGLUR1 AB IFA, CSF: NEGATIVE — SIGNIFICANT CHANGE UP
MGLUR1 AB IFA, CSF: NEGATIVE — SIGNIFICANT CHANGE UP
PCA-TR AB TITR CSF: NEGATIVE — SIGNIFICANT CHANGE UP
PCA-TR AB TITR CSF: NEGATIVE — SIGNIFICANT CHANGE UP

## 2023-12-29 LAB
MBP CSF-MCNC: 2.2 NG/ML — SIGNIFICANT CHANGE UP (ref 0–3.7)
MBP CSF-MCNC: 2.2 NG/ML — SIGNIFICANT CHANGE UP (ref 0–3.7)

## 2023-12-30 LAB
MOG AB CSF QL CBA IFA: NEGATIVE — SIGNIFICANT CHANGE UP
MOG AB CSF QL CBA IFA: NEGATIVE — SIGNIFICANT CHANGE UP

## 2024-01-02 ENCOUNTER — TRANSCRIPTION ENCOUNTER (OUTPATIENT)
Age: 39
End: 2024-01-02

## 2024-01-03 ENCOUNTER — APPOINTMENT (OUTPATIENT)
Dept: INTERNAL MEDICINE | Facility: CLINIC | Age: 39
End: 2024-01-03
Payer: COMMERCIAL

## 2024-01-03 PROCEDURE — 99212 OFFICE O/P EST SF 10 MIN: CPT | Mod: 95

## 2024-01-03 NOTE — REVIEW OF SYSTEMS
[Fever] : no fever [Chills] : no chills [Night Sweats] : no night sweats [Chest Pain] : no chest pain [Palpitations] : no palpitations [Shortness Of Breath] : no shortness of breath [Wheezing] : no wheezing [Cough] : no cough [Dyspnea on Exertion] : no dyspnea on exertion [Abdominal Pain] : no abdominal pain [Nausea] : no nausea [Constipation] : no constipation [Diarrhea] : diarrhea [Vomiting] : no vomiting

## 2024-01-03 NOTE — HISTORY OF PRESENT ILLNESS
[Verbal consent obtained from patient] : the patient, [unfilled] [FreeTextEntry8] : Symptoms started on Sunday with PND, sore throat and have developed into congestion, rhinorrhea. Tested positive on 1/3/24 for COVID.

## 2024-01-03 NOTE — ASSESSMENT
[FreeTextEntry1] : COVID-19: Discussed paxlovid BID. She denies being pregnant or plans in the next 3 months. Call if symptoms worsen.

## 2024-01-04 ENCOUNTER — INPATIENT (INPATIENT)
Facility: HOSPITAL | Age: 39
LOS: 0 days | Discharge: HOME | DRG: 179 | End: 2024-01-05
Attending: PSYCHIATRY & NEUROLOGY | Admitting: PSYCHIATRY & NEUROLOGY
Payer: COMMERCIAL

## 2024-01-04 VITALS
WEIGHT: 139.99 LBS | SYSTOLIC BLOOD PRESSURE: 131 MMHG | HEART RATE: 90 BPM | DIASTOLIC BLOOD PRESSURE: 89 MMHG | RESPIRATION RATE: 17 BRPM | OXYGEN SATURATION: 98 % | TEMPERATURE: 98 F | HEIGHT: 62 IN

## 2024-01-04 PROCEDURE — 99285 EMERGENCY DEPT VISIT HI MDM: CPT

## 2024-01-04 NOTE — ED ADULT TRIAGE NOTE - CHIEF COMPLAINT QUOTE
shortness of breath, dizzy, numbness/tingling bilateral extremities x2 days, sent by neurologist Dr. Gasca  tested pos for covid x2 days ago started on Plaxlovid  discharged from EMU 12/23 for seizures

## 2024-01-05 ENCOUNTER — TRANSCRIPTION ENCOUNTER (OUTPATIENT)
Age: 39
End: 2024-01-05

## 2024-01-05 VITALS
SYSTOLIC BLOOD PRESSURE: 99 MMHG | OXYGEN SATURATION: 98 % | DIASTOLIC BLOOD PRESSURE: 60 MMHG | HEART RATE: 97 BPM | TEMPERATURE: 99 F | RESPIRATION RATE: 18 BRPM

## 2024-01-05 DIAGNOSIS — U07.1 COVID-19: ICD-10-CM

## 2024-01-05 LAB
ALBUMIN SERPL ELPH-MCNC: 3.9 G/DL — SIGNIFICANT CHANGE UP (ref 3.3–5)
ALBUMIN SERPL ELPH-MCNC: 3.9 G/DL — SIGNIFICANT CHANGE UP (ref 3.3–5)
ALBUMIN SERPL ELPH-MCNC: 4.6 G/DL — SIGNIFICANT CHANGE UP (ref 3.3–5)
ALBUMIN SERPL ELPH-MCNC: 4.6 G/DL — SIGNIFICANT CHANGE UP (ref 3.3–5)
ALP SERPL-CCNC: 58 U/L — SIGNIFICANT CHANGE UP (ref 40–120)
ALP SERPL-CCNC: 58 U/L — SIGNIFICANT CHANGE UP (ref 40–120)
ALP SERPL-CCNC: 64 U/L — SIGNIFICANT CHANGE UP (ref 40–120)
ALP SERPL-CCNC: 64 U/L — SIGNIFICANT CHANGE UP (ref 40–120)
ALT FLD-CCNC: 79 U/L — HIGH (ref 10–45)
ALT FLD-CCNC: 79 U/L — HIGH (ref 10–45)
ALT FLD-CCNC: 91 U/L — HIGH (ref 10–45)
ALT FLD-CCNC: 91 U/L — HIGH (ref 10–45)
AMPHET UR-MCNC: NEGATIVE — SIGNIFICANT CHANGE UP
AMPHET UR-MCNC: NEGATIVE — SIGNIFICANT CHANGE UP
ANION GAP SERPL CALC-SCNC: 13 MMOL/L — SIGNIFICANT CHANGE UP (ref 5–17)
ANION GAP SERPL CALC-SCNC: 13 MMOL/L — SIGNIFICANT CHANGE UP (ref 5–17)
ANION GAP SERPL CALC-SCNC: 14 MMOL/L — SIGNIFICANT CHANGE UP (ref 5–17)
ANION GAP SERPL CALC-SCNC: 14 MMOL/L — SIGNIFICANT CHANGE UP (ref 5–17)
APPEARANCE UR: CLEAR — SIGNIFICANT CHANGE UP
APPEARANCE UR: CLEAR — SIGNIFICANT CHANGE UP
AST SERPL-CCNC: 42 U/L — HIGH (ref 10–40)
AST SERPL-CCNC: 42 U/L — HIGH (ref 10–40)
AST SERPL-CCNC: 52 U/L — HIGH (ref 10–40)
AST SERPL-CCNC: 52 U/L — HIGH (ref 10–40)
BARBITURATES UR SCN-MCNC: NEGATIVE — SIGNIFICANT CHANGE UP
BARBITURATES UR SCN-MCNC: NEGATIVE — SIGNIFICANT CHANGE UP
BASOPHILS # BLD AUTO: 0.04 K/UL — SIGNIFICANT CHANGE UP (ref 0–0.2)
BASOPHILS NFR BLD AUTO: 0.6 % — SIGNIFICANT CHANGE UP (ref 0–2)
BASOPHILS NFR BLD AUTO: 0.6 % — SIGNIFICANT CHANGE UP (ref 0–2)
BASOPHILS NFR BLD AUTO: 0.7 % — SIGNIFICANT CHANGE UP (ref 0–2)
BASOPHILS NFR BLD AUTO: 0.7 % — SIGNIFICANT CHANGE UP (ref 0–2)
BENZODIAZ UR-MCNC: NEGATIVE — SIGNIFICANT CHANGE UP
BENZODIAZ UR-MCNC: NEGATIVE — SIGNIFICANT CHANGE UP
BILIRUB SERPL-MCNC: 0.4 MG/DL — SIGNIFICANT CHANGE UP (ref 0.2–1.2)
BILIRUB UR-MCNC: NEGATIVE — SIGNIFICANT CHANGE UP
BILIRUB UR-MCNC: NEGATIVE — SIGNIFICANT CHANGE UP
BUN SERPL-MCNC: 7 MG/DL — SIGNIFICANT CHANGE UP (ref 7–23)
BUN SERPL-MCNC: 7 MG/DL — SIGNIFICANT CHANGE UP (ref 7–23)
BUN SERPL-MCNC: 8 MG/DL — SIGNIFICANT CHANGE UP (ref 7–23)
BUN SERPL-MCNC: 8 MG/DL — SIGNIFICANT CHANGE UP (ref 7–23)
CALCIUM SERPL-MCNC: 9.2 MG/DL — SIGNIFICANT CHANGE UP (ref 8.4–10.5)
CALCIUM SERPL-MCNC: 9.2 MG/DL — SIGNIFICANT CHANGE UP (ref 8.4–10.5)
CALCIUM SERPL-MCNC: 9.8 MG/DL — SIGNIFICANT CHANGE UP (ref 8.4–10.5)
CALCIUM SERPL-MCNC: 9.8 MG/DL — SIGNIFICANT CHANGE UP (ref 8.4–10.5)
CHLORIDE SERPL-SCNC: 100 MMOL/L — SIGNIFICANT CHANGE UP (ref 96–108)
CHLORIDE SERPL-SCNC: 100 MMOL/L — SIGNIFICANT CHANGE UP (ref 96–108)
CHLORIDE SERPL-SCNC: 106 MMOL/L — SIGNIFICANT CHANGE UP (ref 96–108)
CHLORIDE SERPL-SCNC: 106 MMOL/L — SIGNIFICANT CHANGE UP (ref 96–108)
CK SERPL-CCNC: 47 U/L — SIGNIFICANT CHANGE UP (ref 25–170)
CK SERPL-CCNC: 47 U/L — SIGNIFICANT CHANGE UP (ref 25–170)
CO2 SERPL-SCNC: 21 MMOL/L — LOW (ref 22–31)
CO2 SERPL-SCNC: 21 MMOL/L — LOW (ref 22–31)
CO2 SERPL-SCNC: 23 MMOL/L — SIGNIFICANT CHANGE UP (ref 22–31)
CO2 SERPL-SCNC: 23 MMOL/L — SIGNIFICANT CHANGE UP (ref 22–31)
COCAINE METAB.OTHER UR-MCNC: NEGATIVE — SIGNIFICANT CHANGE UP
COCAINE METAB.OTHER UR-MCNC: NEGATIVE — SIGNIFICANT CHANGE UP
COLOR SPEC: YELLOW — SIGNIFICANT CHANGE UP
COLOR SPEC: YELLOW — SIGNIFICANT CHANGE UP
CREAT SERPL-MCNC: 0.72 MG/DL — SIGNIFICANT CHANGE UP (ref 0.5–1.3)
CREAT SERPL-MCNC: 0.72 MG/DL — SIGNIFICANT CHANGE UP (ref 0.5–1.3)
CREAT SERPL-MCNC: 0.76 MG/DL — SIGNIFICANT CHANGE UP (ref 0.5–1.3)
CREAT SERPL-MCNC: 0.76 MG/DL — SIGNIFICANT CHANGE UP (ref 0.5–1.3)
DIFF PNL FLD: NEGATIVE — SIGNIFICANT CHANGE UP
DIFF PNL FLD: NEGATIVE — SIGNIFICANT CHANGE UP
EGFR: 103 ML/MIN/1.73M2 — SIGNIFICANT CHANGE UP
EGFR: 103 ML/MIN/1.73M2 — SIGNIFICANT CHANGE UP
EGFR: 110 ML/MIN/1.73M2 — SIGNIFICANT CHANGE UP
EGFR: 110 ML/MIN/1.73M2 — SIGNIFICANT CHANGE UP
EOSINOPHIL # BLD AUTO: 0.04 K/UL — SIGNIFICANT CHANGE UP (ref 0–0.5)
EOSINOPHIL NFR BLD AUTO: 0.6 % — SIGNIFICANT CHANGE UP (ref 0–6)
EOSINOPHIL NFR BLD AUTO: 0.6 % — SIGNIFICANT CHANGE UP (ref 0–6)
EOSINOPHIL NFR BLD AUTO: 0.7 % — SIGNIFICANT CHANGE UP (ref 0–6)
EOSINOPHIL NFR BLD AUTO: 0.7 % — SIGNIFICANT CHANGE UP (ref 0–6)
FLUAV AG NPH QL: SIGNIFICANT CHANGE UP
FLUAV AG NPH QL: SIGNIFICANT CHANGE UP
FLUBV AG NPH QL: SIGNIFICANT CHANGE UP
FLUBV AG NPH QL: SIGNIFICANT CHANGE UP
FT4I SERPL CALC-MCNC: 3 FTI% — SIGNIFICANT CHANGE UP (ref 1.4–4.8)
FT4I SERPL CALC-MCNC: 3 FTI% — SIGNIFICANT CHANGE UP (ref 1.4–4.8)
FT4I SERPL CALC-MCNC: 8.5 INDEX — SIGNIFICANT CHANGE UP (ref 4.4–11.4)
FT4I SERPL CALC-MCNC: 8.5 INDEX — SIGNIFICANT CHANGE UP (ref 4.4–11.4)
GLUCOSE SERPL-MCNC: 87 MG/DL — SIGNIFICANT CHANGE UP (ref 70–99)
GLUCOSE SERPL-MCNC: 87 MG/DL — SIGNIFICANT CHANGE UP (ref 70–99)
GLUCOSE SERPL-MCNC: 89 MG/DL — SIGNIFICANT CHANGE UP (ref 70–99)
GLUCOSE SERPL-MCNC: 89 MG/DL — SIGNIFICANT CHANGE UP (ref 70–99)
GLUCOSE UR QL: NEGATIVE MG/DL — SIGNIFICANT CHANGE UP
GLUCOSE UR QL: NEGATIVE MG/DL — SIGNIFICANT CHANGE UP
HCG SERPL-ACNC: <2 MIU/ML — SIGNIFICANT CHANGE UP
HCG SERPL-ACNC: <2 MIU/ML — SIGNIFICANT CHANGE UP
HCT VFR BLD CALC: 36.2 % — SIGNIFICANT CHANGE UP (ref 34.5–45)
HCT VFR BLD CALC: 36.2 % — SIGNIFICANT CHANGE UP (ref 34.5–45)
HCT VFR BLD CALC: 37.1 % — SIGNIFICANT CHANGE UP (ref 34.5–45)
HCT VFR BLD CALC: 37.1 % — SIGNIFICANT CHANGE UP (ref 34.5–45)
HGB BLD-MCNC: 12.1 G/DL — SIGNIFICANT CHANGE UP (ref 11.5–15.5)
HGB BLD-MCNC: 12.1 G/DL — SIGNIFICANT CHANGE UP (ref 11.5–15.5)
HGB BLD-MCNC: 12.6 G/DL — SIGNIFICANT CHANGE UP (ref 11.5–15.5)
HGB BLD-MCNC: 12.6 G/DL — SIGNIFICANT CHANGE UP (ref 11.5–15.5)
IMM GRANULOCYTES NFR BLD AUTO: 0.1 % — SIGNIFICANT CHANGE UP (ref 0–0.9)
IMM GRANULOCYTES NFR BLD AUTO: 0.1 % — SIGNIFICANT CHANGE UP (ref 0–0.9)
IMM GRANULOCYTES NFR BLD AUTO: 0.2 % — SIGNIFICANT CHANGE UP (ref 0–0.9)
IMM GRANULOCYTES NFR BLD AUTO: 0.2 % — SIGNIFICANT CHANGE UP (ref 0–0.9)
KETONES UR-MCNC: NEGATIVE MG/DL — SIGNIFICANT CHANGE UP
KETONES UR-MCNC: NEGATIVE MG/DL — SIGNIFICANT CHANGE UP
LACTATE SERPL-SCNC: 0.7 MMOL/L — SIGNIFICANT CHANGE UP (ref 0.5–2)
LACTATE SERPL-SCNC: 0.7 MMOL/L — SIGNIFICANT CHANGE UP (ref 0.5–2)
LEUKOCYTE ESTERASE UR-ACNC: NEGATIVE — SIGNIFICANT CHANGE UP
LEUKOCYTE ESTERASE UR-ACNC: NEGATIVE — SIGNIFICANT CHANGE UP
LYMPHOCYTES # BLD AUTO: 2.26 K/UL — SIGNIFICANT CHANGE UP (ref 1–3.3)
LYMPHOCYTES # BLD AUTO: 2.26 K/UL — SIGNIFICANT CHANGE UP (ref 1–3.3)
LYMPHOCYTES # BLD AUTO: 3.14 K/UL — SIGNIFICANT CHANGE UP (ref 1–3.3)
LYMPHOCYTES # BLD AUTO: 3.14 K/UL — SIGNIFICANT CHANGE UP (ref 1–3.3)
LYMPHOCYTES # BLD AUTO: 41.9 % — SIGNIFICANT CHANGE UP (ref 13–44)
LYMPHOCYTES # BLD AUTO: 41.9 % — SIGNIFICANT CHANGE UP (ref 13–44)
LYMPHOCYTES # BLD AUTO: 45.2 % — HIGH (ref 13–44)
LYMPHOCYTES # BLD AUTO: 45.2 % — HIGH (ref 13–44)
MAGNESIUM SERPL-MCNC: 2.3 MG/DL — SIGNIFICANT CHANGE UP (ref 1.6–2.6)
MAGNESIUM SERPL-MCNC: 2.3 MG/DL — SIGNIFICANT CHANGE UP (ref 1.6–2.6)
MCHC RBC-ENTMCNC: 30.6 PG — SIGNIFICANT CHANGE UP (ref 27–34)
MCHC RBC-ENTMCNC: 30.6 PG — SIGNIFICANT CHANGE UP (ref 27–34)
MCHC RBC-ENTMCNC: 30.9 PG — SIGNIFICANT CHANGE UP (ref 27–34)
MCHC RBC-ENTMCNC: 30.9 PG — SIGNIFICANT CHANGE UP (ref 27–34)
MCHC RBC-ENTMCNC: 33.4 GM/DL — SIGNIFICANT CHANGE UP (ref 32–36)
MCHC RBC-ENTMCNC: 33.4 GM/DL — SIGNIFICANT CHANGE UP (ref 32–36)
MCHC RBC-ENTMCNC: 34 GM/DL — SIGNIFICANT CHANGE UP (ref 32–36)
MCHC RBC-ENTMCNC: 34 GM/DL — SIGNIFICANT CHANGE UP (ref 32–36)
MCV RBC AUTO: 90 FL — SIGNIFICANT CHANGE UP (ref 80–100)
MCV RBC AUTO: 90 FL — SIGNIFICANT CHANGE UP (ref 80–100)
MCV RBC AUTO: 92.3 FL — SIGNIFICANT CHANGE UP (ref 80–100)
MCV RBC AUTO: 92.3 FL — SIGNIFICANT CHANGE UP (ref 80–100)
METHADONE UR-MCNC: NEGATIVE — SIGNIFICANT CHANGE UP
METHADONE UR-MCNC: NEGATIVE — SIGNIFICANT CHANGE UP
MONOCYTES # BLD AUTO: 0.48 K/UL — SIGNIFICANT CHANGE UP (ref 0–0.9)
MONOCYTES # BLD AUTO: 0.48 K/UL — SIGNIFICANT CHANGE UP (ref 0–0.9)
MONOCYTES # BLD AUTO: 0.58 K/UL — SIGNIFICANT CHANGE UP (ref 0–0.9)
MONOCYTES # BLD AUTO: 0.58 K/UL — SIGNIFICANT CHANGE UP (ref 0–0.9)
MONOCYTES NFR BLD AUTO: 8.4 % — SIGNIFICANT CHANGE UP (ref 2–14)
MONOCYTES NFR BLD AUTO: 8.4 % — SIGNIFICANT CHANGE UP (ref 2–14)
MONOCYTES NFR BLD AUTO: 8.9 % — SIGNIFICANT CHANGE UP (ref 2–14)
MONOCYTES NFR BLD AUTO: 8.9 % — SIGNIFICANT CHANGE UP (ref 2–14)
NEUTROPHILS # BLD AUTO: 2.57 K/UL — SIGNIFICANT CHANGE UP (ref 1.8–7.4)
NEUTROPHILS # BLD AUTO: 2.57 K/UL — SIGNIFICANT CHANGE UP (ref 1.8–7.4)
NEUTROPHILS # BLD AUTO: 3.13 K/UL — SIGNIFICANT CHANGE UP (ref 1.8–7.4)
NEUTROPHILS # BLD AUTO: 3.13 K/UL — SIGNIFICANT CHANGE UP (ref 1.8–7.4)
NEUTROPHILS NFR BLD AUTO: 45.1 % — SIGNIFICANT CHANGE UP (ref 43–77)
NEUTROPHILS NFR BLD AUTO: 45.1 % — SIGNIFICANT CHANGE UP (ref 43–77)
NEUTROPHILS NFR BLD AUTO: 47.6 % — SIGNIFICANT CHANGE UP (ref 43–77)
NEUTROPHILS NFR BLD AUTO: 47.6 % — SIGNIFICANT CHANGE UP (ref 43–77)
NITRITE UR-MCNC: NEGATIVE — SIGNIFICANT CHANGE UP
NITRITE UR-MCNC: NEGATIVE — SIGNIFICANT CHANGE UP
NRBC # BLD: 0 /100 WBCS — SIGNIFICANT CHANGE UP (ref 0–0)
OPIATES UR-MCNC: NEGATIVE — SIGNIFICANT CHANGE UP
OPIATES UR-MCNC: NEGATIVE — SIGNIFICANT CHANGE UP
OXYCODONE UR-MCNC: NEGATIVE — SIGNIFICANT CHANGE UP
OXYCODONE UR-MCNC: NEGATIVE — SIGNIFICANT CHANGE UP
PCP SPEC-MCNC: SIGNIFICANT CHANGE UP
PCP SPEC-MCNC: SIGNIFICANT CHANGE UP
PCP UR-MCNC: NEGATIVE — SIGNIFICANT CHANGE UP
PCP UR-MCNC: NEGATIVE — SIGNIFICANT CHANGE UP
PH UR: 7.5 — SIGNIFICANT CHANGE UP (ref 5–8)
PH UR: 7.5 — SIGNIFICANT CHANGE UP (ref 5–8)
PHOSPHATE SERPL-MCNC: 4 MG/DL — SIGNIFICANT CHANGE UP (ref 2.5–4.5)
PHOSPHATE SERPL-MCNC: 4 MG/DL — SIGNIFICANT CHANGE UP (ref 2.5–4.5)
PLATELET # BLD AUTO: 216 K/UL — SIGNIFICANT CHANGE UP (ref 150–400)
PLATELET # BLD AUTO: 216 K/UL — SIGNIFICANT CHANGE UP (ref 150–400)
PLATELET # BLD AUTO: 219 K/UL — SIGNIFICANT CHANGE UP (ref 150–400)
PLATELET # BLD AUTO: 219 K/UL — SIGNIFICANT CHANGE UP (ref 150–400)
POTASSIUM SERPL-MCNC: 3.6 MMOL/L — SIGNIFICANT CHANGE UP (ref 3.5–5.3)
POTASSIUM SERPL-MCNC: 3.6 MMOL/L — SIGNIFICANT CHANGE UP (ref 3.5–5.3)
POTASSIUM SERPL-MCNC: 3.8 MMOL/L — SIGNIFICANT CHANGE UP (ref 3.5–5.3)
POTASSIUM SERPL-MCNC: 3.8 MMOL/L — SIGNIFICANT CHANGE UP (ref 3.5–5.3)
POTASSIUM SERPL-SCNC: 3.6 MMOL/L — SIGNIFICANT CHANGE UP (ref 3.5–5.3)
POTASSIUM SERPL-SCNC: 3.6 MMOL/L — SIGNIFICANT CHANGE UP (ref 3.5–5.3)
POTASSIUM SERPL-SCNC: 3.8 MMOL/L — SIGNIFICANT CHANGE UP (ref 3.5–5.3)
POTASSIUM SERPL-SCNC: 3.8 MMOL/L — SIGNIFICANT CHANGE UP (ref 3.5–5.3)
PROT SERPL-MCNC: 7.5 G/DL — SIGNIFICANT CHANGE UP (ref 6–8.3)
PROT SERPL-MCNC: 7.5 G/DL — SIGNIFICANT CHANGE UP (ref 6–8.3)
PROT SERPL-MCNC: 8.3 G/DL — SIGNIFICANT CHANGE UP (ref 6–8.3)
PROT SERPL-MCNC: 8.3 G/DL — SIGNIFICANT CHANGE UP (ref 6–8.3)
PROT UR-MCNC: NEGATIVE MG/DL — SIGNIFICANT CHANGE UP
PROT UR-MCNC: NEGATIVE MG/DL — SIGNIFICANT CHANGE UP
RBC # BLD: 3.92 M/UL — SIGNIFICANT CHANGE UP (ref 3.8–5.2)
RBC # BLD: 3.92 M/UL — SIGNIFICANT CHANGE UP (ref 3.8–5.2)
RBC # BLD: 4.12 M/UL — SIGNIFICANT CHANGE UP (ref 3.8–5.2)
RBC # BLD: 4.12 M/UL — SIGNIFICANT CHANGE UP (ref 3.8–5.2)
RBC # FLD: 12.4 % — SIGNIFICANT CHANGE UP (ref 10.3–14.5)
RBC # FLD: 12.4 % — SIGNIFICANT CHANGE UP (ref 10.3–14.5)
RBC # FLD: 12.6 % — SIGNIFICANT CHANGE UP (ref 10.3–14.5)
RBC # FLD: 12.6 % — SIGNIFICANT CHANGE UP (ref 10.3–14.5)
RSV RNA NPH QL NAA+NON-PROBE: SIGNIFICANT CHANGE UP
RSV RNA NPH QL NAA+NON-PROBE: SIGNIFICANT CHANGE UP
SARS-COV-2 RNA SPEC QL NAA+PROBE: DETECTED
SARS-COV-2 RNA SPEC QL NAA+PROBE: DETECTED
SODIUM SERPL-SCNC: 137 MMOL/L — SIGNIFICANT CHANGE UP (ref 135–145)
SODIUM SERPL-SCNC: 137 MMOL/L — SIGNIFICANT CHANGE UP (ref 135–145)
SODIUM SERPL-SCNC: 140 MMOL/L — SIGNIFICANT CHANGE UP (ref 135–145)
SODIUM SERPL-SCNC: 140 MMOL/L — SIGNIFICANT CHANGE UP (ref 135–145)
SP GR SPEC: 1.01 — SIGNIFICANT CHANGE UP (ref 1–1.03)
SP GR SPEC: 1.01 — SIGNIFICANT CHANGE UP (ref 1–1.03)
T3 SERPL-MCNC: 155 NG/DL — SIGNIFICANT CHANGE UP (ref 80–200)
T3 SERPL-MCNC: 155 NG/DL — SIGNIFICANT CHANGE UP (ref 80–200)
T3/T3 UPTAKE INDEX SERPL-RTO: 38 % — SIGNIFICANT CHANGE UP (ref 28–41)
T3/T3 UPTAKE INDEX SERPL-RTO: 38 % — SIGNIFICANT CHANGE UP (ref 28–41)
T4 AB SER-ACNC: 7.8 UG/DL — SIGNIFICANT CHANGE UP (ref 4.6–12)
T4 AB SER-ACNC: 7.8 UG/DL — SIGNIFICANT CHANGE UP (ref 4.6–12)
T4 AB SER-ACNC: 8.7 UG/DL — SIGNIFICANT CHANGE UP (ref 4.6–12)
T4 AB SER-ACNC: 8.7 UG/DL — SIGNIFICANT CHANGE UP (ref 4.6–12)
T4 FREE SERPL-MCNC: 1.4 NG/DL — SIGNIFICANT CHANGE UP (ref 0.9–1.8)
T4 FREE SERPL-MCNC: 1.4 NG/DL — SIGNIFICANT CHANGE UP (ref 0.9–1.8)
T4/T3 UPTAKE INDEX SERPL: 0.9 TBI — SIGNIFICANT CHANGE UP (ref 0.8–1.3)
T4/T3 UPTAKE INDEX SERPL: 0.9 TBI — SIGNIFICANT CHANGE UP (ref 0.8–1.3)
THC UR QL: NEGATIVE — SIGNIFICANT CHANGE UP
THC UR QL: NEGATIVE — SIGNIFICANT CHANGE UP
TSH SERPL-MCNC: 5.65 UIU/ML — HIGH (ref 0.27–4.2)
TSH SERPL-MCNC: 5.65 UIU/ML — HIGH (ref 0.27–4.2)
UROBILINOGEN FLD QL: 0.2 MG/DL — SIGNIFICANT CHANGE UP (ref 0.2–1)
UROBILINOGEN FLD QL: 0.2 MG/DL — SIGNIFICANT CHANGE UP (ref 0.2–1)
VIT B12 SERPL-MCNC: 896 PG/ML — SIGNIFICANT CHANGE UP (ref 232–1245)
VIT B12 SERPL-MCNC: 896 PG/ML — SIGNIFICANT CHANGE UP (ref 232–1245)
WBC # BLD: 5.4 K/UL — SIGNIFICANT CHANGE UP (ref 3.8–10.5)
WBC # BLD: 5.4 K/UL — SIGNIFICANT CHANGE UP (ref 3.8–10.5)
WBC # BLD: 6.94 K/UL — SIGNIFICANT CHANGE UP (ref 3.8–10.5)
WBC # BLD: 6.94 K/UL — SIGNIFICANT CHANGE UP (ref 3.8–10.5)
WBC # FLD AUTO: 5.4 K/UL — SIGNIFICANT CHANGE UP (ref 3.8–10.5)
WBC # FLD AUTO: 5.4 K/UL — SIGNIFICANT CHANGE UP (ref 3.8–10.5)
WBC # FLD AUTO: 6.94 K/UL — SIGNIFICANT CHANGE UP (ref 3.8–10.5)
WBC # FLD AUTO: 6.94 K/UL — SIGNIFICANT CHANGE UP (ref 3.8–10.5)

## 2024-01-05 PROCEDURE — 83735 ASSAY OF MAGNESIUM: CPT

## 2024-01-05 PROCEDURE — 85025 COMPLETE CBC W/AUTO DIFF WBC: CPT

## 2024-01-05 PROCEDURE — 84443 ASSAY THYROID STIM HORMONE: CPT

## 2024-01-05 PROCEDURE — 95700 EEG CONT REC W/VID EEG TECH: CPT

## 2024-01-05 PROCEDURE — 36415 COLL VENOUS BLD VENIPUNCTURE: CPT

## 2024-01-05 PROCEDURE — 84100 ASSAY OF PHOSPHORUS: CPT

## 2024-01-05 PROCEDURE — 84436 ASSAY OF TOTAL THYROXINE: CPT

## 2024-01-05 PROCEDURE — 80053 COMPREHEN METABOLIC PANEL: CPT

## 2024-01-05 PROCEDURE — 80307 DRUG TEST PRSMV CHEM ANLYZR: CPT

## 2024-01-05 PROCEDURE — 99285 EMERGENCY DEPT VISIT HI MDM: CPT | Mod: 25

## 2024-01-05 PROCEDURE — 87637 SARSCOV2&INF A&B&RSV AMP PRB: CPT

## 2024-01-05 PROCEDURE — 0241U: CPT

## 2024-01-05 PROCEDURE — 84439 ASSAY OF FREE THYROXINE: CPT

## 2024-01-05 PROCEDURE — 95713 VEEG 2-12 HR CONT MNTR: CPT

## 2024-01-05 PROCEDURE — 84702 CHORIONIC GONADOTROPIN TEST: CPT

## 2024-01-05 PROCEDURE — 83605 ASSAY OF LACTIC ACID: CPT

## 2024-01-05 PROCEDURE — 99222 1ST HOSP IP/OBS MODERATE 55: CPT

## 2024-01-05 PROCEDURE — 82607 VITAMIN B-12: CPT

## 2024-01-05 PROCEDURE — 84479 ASSAY OF THYROID (T3 OR T4): CPT

## 2024-01-05 PROCEDURE — 96374 THER/PROPH/DIAG INJ IV PUSH: CPT

## 2024-01-05 PROCEDURE — 84480 ASSAY TRIIODOTHYRONINE (T3): CPT

## 2024-01-05 PROCEDURE — 81003 URINALYSIS AUTO W/O SCOPE: CPT

## 2024-01-05 PROCEDURE — 82550 ASSAY OF CK (CPK): CPT

## 2024-01-05 PROCEDURE — 95718 EEG PHYS/QHP 2-12 HR W/VEEG: CPT

## 2024-01-05 RX ORDER — RITONAVIR 100 MG/1
100 TABLET, FILM COATED ORAL
Refills: 0 | Status: DISCONTINUED | OUTPATIENT
Start: 2024-01-05 | End: 2024-01-05

## 2024-01-05 RX ORDER — FAMOTIDINE 10 MG/ML
1 INJECTION INTRAVENOUS
Qty: 14 | Refills: 0
Start: 2024-01-05

## 2024-01-05 RX ORDER — ACETAMINOPHEN 500 MG
650 TABLET ORAL ONCE
Refills: 0 | Status: DISCONTINUED | OUTPATIENT
Start: 2024-01-05 | End: 2024-01-05

## 2024-01-05 RX ORDER — FAMOTIDINE 10 MG/ML
20 INJECTION INTRAVENOUS
Refills: 0 | Status: DISCONTINUED | OUTPATIENT
Start: 2024-01-05 | End: 2024-01-05

## 2024-01-05 RX ORDER — NIRMATRELVIR AND RITONAVIR 150-100 MG
1 KIT ORAL
Refills: 0 | DISCHARGE

## 2024-01-05 RX ORDER — SODIUM CHLORIDE 9 MG/ML
1000 INJECTION INTRAMUSCULAR; INTRAVENOUS; SUBCUTANEOUS ONCE
Refills: 0 | Status: COMPLETED | OUTPATIENT
Start: 2024-01-05 | End: 2024-01-05

## 2024-01-05 RX ORDER — ACETAMINOPHEN 500 MG
1000 TABLET ORAL ONCE
Refills: 0 | Status: COMPLETED | OUTPATIENT
Start: 2024-01-05 | End: 2024-01-05

## 2024-01-05 RX ORDER — CHLORHEXIDINE GLUCONATE 213 G/1000ML
1 SOLUTION TOPICAL DAILY
Refills: 0 | Status: DISCONTINUED | OUTPATIENT
Start: 2024-01-05 | End: 2024-01-05

## 2024-01-05 RX ADMIN — Medication 400 MILLIGRAM(S): at 02:34

## 2024-01-05 RX ADMIN — SODIUM CHLORIDE 1000 MILLILITER(S): 9 INJECTION INTRAMUSCULAR; INTRAVENOUS; SUBCUTANEOUS at 02:34

## 2024-01-05 RX ADMIN — Medication 250 MILLIGRAM(S): at 11:27

## 2024-01-05 RX ADMIN — Medication 250 MILLIGRAM(S): at 12:00

## 2024-01-05 RX ADMIN — RITONAVIR 100 MILLIGRAM(S): 100 TABLET, FILM COATED ORAL at 11:38

## 2024-01-05 NOTE — H&P ADULT - HISTORY OF PRESENT ILLNESS
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 Different from her previous symptoms of right sided paralysis, from the right hand extending up to her right arm along with numbness and tingling in the face and dysarthria, AMS. On exam patient noted to have some giveway weakness, 4+/5 strength in UE and 4/5 strength in LEs. No numbness or tingling currently. Denies any headache.       Review of Systems:  NEUROLOGICAL: +As stated in HPI above  SKIN: No itching, burning, rashes, or lesions   All other review of systems is negative unless indicated above.    Allergies:  penicillin (Short breath)      PMHx/PSHx/Family Hx: As above, otherwise see below   No pertinent past medical history    Hemochromatosis    H/O Hashimoto thyroiditis    Hypothyroidism    Viral meningitis        Social Hx:  No current use of tobacco, alcohol, or illicit drugs  Lives with ***    Medications:  MEDICATIONS  (STANDING):    MEDICATIONS  (PRN):      Vitals:  T(C): 36.5 (01-05-24 @ 02:18), Max: 36.7 (01-04-24 @ 18:12)  HR: 75 (01-05-24 @ 02:18) (75 - 90)  BP: 121/73 (01-05-24 @ 02:18) (121/73 - 131/89)  RR: 18 (01-05-24 @ 02:18) (17 - 18)  SpO2: 100% (01-05-24 @ 02:18) (98% - 100%)    Physical Examination: Neurologic Exam:    Labs:                        12.6   6.94  )-----------( 219      ( 05 Jan 2024 02:54 )             37.1     01-05    137  |  100  |  8   ----------------------------<  89  3.6   |  23  |  0.76    Ca    9.8      05 Jan 2024 02:54  Phos  4.0     01-05  Mg     2.3     01-05    TPro  8.3  /  Alb  4.6  /  TBili  0.4  /  DBili  x   /  AST  52<H>  /  ALT  91<H>  /  AlkPhos  64  01-05    CAPILLARY BLOOD GLUCOSE        LIVER FUNCTIONS - ( 05 Jan 2024 02:54 )  Alb: 4.6 g/dL / Pro: 8.3 g/dL / ALK PHOS: 64 U/L / ALT: 91 U/L / AST: 52 U/L / GGT: x               CSF:                  Radiology:     Neurology - Consult Note    -  Spectra: [phone] ([facility]), [phone] ([facility])    HPI: Patient MORE ANDREW is a 38y (1985) woman with a PMHx significant for Hashimoto's and hemochromatosis who recently was also admitted to EMU for event capture in setting of suspected seizures 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 Different from her previous symptoms of right sided paralysis, from the right hand extending up to her right arm along with numbness and tingling in the face and dysarthria, AMS. On exam patient noted to have some giveway weakness, 4+/5 strength in UE and 4/5 strength in LEs. No numbness or tingling currently. Denies any headache.       Review of Systems:  NEUROLOGICAL: +As stated in HPI above  SKIN: No itching, burning, rashes, or lesions   All other review of systems is negative unless indicated above.    Allergies:  penicillin (Short breath)      PMHx/PSHx/Family Hx: As above, otherwise see below   No pertinent past medical history    Hemochromatosis    H/O Hashimoto thyroiditis    Hypothyroidism    Viral meningitis        Social Hx:  No current use of tobacco, alcohol, or illicit drugs  Lives with ***    Medications:  MEDICATIONS  (STANDING):    MEDICATIONS  (PRN):      Vitals:  T(C): 36.5 (01-05-24 @ 02:18), Max: 36.7 (01-04-24 @ 18:12)  HR: 75 (01-05-24 @ 02:18) (75 - 90)  BP: 121/73 (01-05-24 @ 02:18) (121/73 - 131/89)  RR: 18 (01-05-24 @ 02:18) (17 - 18)  SpO2: 100% (01-05-24 @ 02:18) (98% - 100%)    Physical Examination: Neurologic Exam:    Labs:                        12.6   6.94  )-----------( 219      ( 05 Jan 2024 02:54 )             37.1     01-05    137  |  100  |  8   ----------------------------<  89  3.6   |  23  |  0.76    Ca    9.8      05 Jan 2024 02:54  Phos  4.0     01-05  Mg     2.3     01-05    TPro  8.3  /  Alb  4.6  /  TBili  0.4  /  DBili  x   /  AST  52<H>  /  ALT  91<H>  /  AlkPhos  64  01-05    CAPILLARY BLOOD GLUCOSE        LIVER FUNCTIONS - ( 05 Jan 2024 02:54 )  Alb: 4.6 g/dL / Pro: 8.3 g/dL / ALK PHOS: 64 U/L / ALT: 91 U/L / AST: 52 U/L / GGT: x               CSF:                  Radiology:     Neurology - Consult Note    -  Spectra: [phone] ([facility]), [phone] ([facility])    HPI: Patient MORE ANDREW is a 38y (1985) woman with a PMHx significant for Hashimoto's and hemochromatosis who recently was also admitted to EMU for event capture in setting of migratory body jolts, numbness, tingling, HA, post-possible ?autoimmune encephalitis/viral encephalitis, d/c home with EEG with shifting temporal max slowing.  No epileptiform abn, s/p IVIG.     Presented today after a few days of generalized weakness, COVID+, lightheadedness (no room spinning dizziness), occasional bilateral tingling in her upper extremities > lower extremities. Different from her previous symptoms of right sided paralysis, from the right hand extending up to her right arm along with numbness and tingling in the face and dysarthria, AMS.     On exam in ER patient noted to have some giveway weakness, 4+/5 strength in UE and 4/5 strength in LEs. No numbness or tingling currently. Denies any headache.       Review of Systems:  NEUROLOGICAL: +As stated in HPI above  SKIN: No itching, burning, rashes, or lesions   All other review of systems is negative unless indicated above.    Allergies:  penicillin (Short breath)      PMHx/PSHx/Family Hx: As above, otherwise see below   No pertinent past medical history    Hemochromatosis    H/O Hashimoto thyroiditis    Hypothyroidism    Viral meningitis        Social Hx:  No current use of tobacco, alcohol, or illicit drugs  Lives with ***    Medications:  MEDICATIONS  (STANDING):    MEDICATIONS  (PRN):      Vitals:  T(C): 36.5 (01-05-24 @ 02:18), Max: 36.7 (01-04-24 @ 18:12)  HR: 75 (01-05-24 @ 02:18) (75 - 90)  BP: 121/73 (01-05-24 @ 02:18) (121/73 - 131/89)  RR: 18 (01-05-24 @ 02:18) (17 - 18)  SpO2: 100% (01-05-24 @ 02:18) (98% - 100%)    Physical Examination: Neurologic Exam:    Labs:                        12.6   6.94  )-----------( 219      ( 05 Jan 2024 02:54 )             37.1     01-05    137  |  100  |  8   ----------------------------<  89  3.6   |  23  |  0.76    Ca    9.8      05 Jan 2024 02:54  Phos  4.0     01-05  Mg     2.3     01-05    TPro  8.3  /  Alb  4.6  /  TBili  0.4  /  DBili  x   /  AST  52<H>  /  ALT  91<H>  /  AlkPhos  64  01-05    CAPILLARY BLOOD GLUCOSE        LIVER FUNCTIONS - ( 05 Jan 2024 02:54 )  Alb: 4.6 g/dL / Pro: 8.3 g/dL / ALK PHOS: 64 U/L / ALT: 91 U/L / AST: 52 U/L / GGT: x             CSF:  Radiology:     Neurology - Consult Note    -  Spectra: [phone] ([facility]), [phone] ([facility])    HPI: Patient MORE ANDREW is a 38y (1985) woman with a PMHx significant for Hashimoto's and hemochromatosis who recently was also admitted to EMU for event capture in setting of migratory body jolts, numbness, tingling, HA, post-possible ?autoimmune encephalitis/viral encephalitis, d/c home with EEG with shifting temporal max slowing.  No epileptiform abn, s/p IVIG.     Presented today after a few days of generalized weakness, COVID+, lightheadedness (no room spinning dizziness), occasional bilateral tingling in her upper extremities > lower extremities. Different from her previous symptoms of right sided paralysis, from the right hand extending up to her right arm along with numbness and tingling in the face and dysarthria, AMS.     On exam in ER patient noted to have some giveway weakness, 4+/5 strength in UE and 4/5 strength in LEs. No numbness or tingling currently. Denies any headache.       Review of Systems:  NEUROLOGICAL: +As stated in HPI above  SKIN: No itching, burning, rashes, or lesions   All other review of systems is negative unless indicated above.    Allergies:  penicillin (Short breath)      PMHx/PSHx/Family Hx: As above, otherwise see below   No pertinent past medical history    Hemochromatosis    H/O Hashimoto thyroiditis    Hypothyroidism    Viral meningitis        Social Hx:  No current use of tobacco, alcohol, or illicit drugs  Lives with ***    Medications:  MEDICATIONS  (STANDING):    MEDICATIONS  (PRN):      Vitals:  T(C): 36.5 (01-05-24 @ 02:18), Max: 36.7 (01-04-24 @ 18:12)  HR: 75 (01-05-24 @ 02:18) (75 - 90)  BP: 121/73 (01-05-24 @ 02:18) (121/73 - 131/89)  RR: 18 (01-05-24 @ 02:18) (17 - 18)  SpO2: 100% (01-05-24 @ 02:18) (98% - 100%)    Physical Examination: Neurologic Exam:    Labs:                        12.6   6.94  )-----------( 219      ( 05 Jan 2024 02:54 )             37.1     01-05    137  |  100  |  8   ----------------------------<  89  3.6   |  23  |  0.76    Ca    9.8      05 Jan 2024 02:54  Phos  4.0     01-05  Mg     2.3     01-05    TPro  8.3  /  Alb  4.6  /  TBili  0.4  /  DBili  x   /  AST  52<H>  /  ALT  91<H>  /  AlkPhos  64  01-05    CAPILLARY BLOOD GLUCOSE        LIVER FUNCTIONS - ( 05 Jan 2024 02:54 )  Alb: 4.6 g/dL / Pro: 8.3 g/dL / ALK PHOS: 64 U/L / ALT: 91 U/L / AST: 52 U/L / GGT: x             CSF:  Radiology:

## 2024-01-05 NOTE — ED PROVIDER NOTE - OBJECTIVE STATEMENT
38 F PM of feliciano 38 F PMH of hashimto's thyroiditis (currently off levothyroxine) and viral encephalitis in December 2023 sent by Dr. Mckeon's office to ED for neurologic symptoms in context of COVID infection. Pt has had COVID symptoms (rhinorrhea/sore throat) for 5 days. She was started on Paxlovid 2 days ago. h/o 4 previous COVID infections without complication. No fever or cough. Neurologic symptoms include headaches described as vibration in head, tingling up and down both arms and legs. Feeling off balance.

## 2024-01-05 NOTE — ED ADULT NURSE NOTE - OBJECTIVE STATEMENT
39 y/o F A&Ox4 with PMH of viral meningitis and autoimmune encephalitis. Pt presents to the ED c/o HA, dizziness, weakness, numbness and tingling x2 days. Pt endorses numbness and tingling in bilateral extremities; endorses feeling it more on R side however, denies numbness/ tingling at this time. Pt endorses mild nausea; denies vomiting. Pt reports she began having sore throat on Sunday and was advised by her PCP to take an at home COVID test; reports testing positive on Tuesday and was started on Plaxlovid. Pt reports her neurologist advised pt to come into ED for further work up due to worsening neurological symptoms. Pt reports she was discharged from EMU on 12/23/23. Denies chest pain, SOB, lightheadedness, vision changes. IV access established; 20 G R AC. Patient safety maintained, bed is in lowest position, wheels locked, and side rails raised. 37 y/o F A&Ox4 with PMH of viral meningitis and autoimmune encephalitis. Pt presents to the ED c/o HA, dizziness, weakness, numbness and tingling x2 days. Pt endorses numbness and tingling in bilateral extremities; endorses feeling it more on R side however, denies numbness/ tingling at this time. Pt endorses mild nausea; denies vomiting. Pt reports she began having sore throat on Sunday and was advised by her PCP to take an at home COVID test; reports testing positive on Tuesday and was started on Plaxlovid. Pt reports her neurologist advised pt to come into ED for further work up due to worsening neurological symptoms. Pt reports she was discharged from EMU on 12/23/23. Denies chest pain, SOB, lightheadedness, vision changes. IV access established; 20 G R AC. Patient safety maintained, bed is in lowest position, wheels locked, and side rails raised.

## 2024-01-05 NOTE — DISCHARGE NOTE NURSING/CASE MANAGEMENT/SOCIAL WORK - NSDCPEFALRISK_GEN_ALL_CORE
For information on Fall & Injury Prevention, visit: [email or web address] OR  [email or web address] OR  https://www.cdc.gov/steadi/patient.html For information on Fall & Injury Prevention, visit: [email or web address] OR  [email or web address] OR  https://www.cdc.gov/steadi/patient.html

## 2024-01-05 NOTE — DISCHARGE NOTE PROVIDER - CARE PROVIDER_API CALL
Shandra Lincoln  Neurology  [address], [address]  [place] [address]  Phone: [phone]  Fax: [phone]  Follow Up Time: Routine   Shandra Lincoln  Neurology  [address], [address]  [place] [address]  Phone: [phone]  Fax: [phone]  Follow Up Time: Routine

## 2024-01-05 NOTE — DISCHARGE NOTE PROVIDER - NSDCFUSCHEDAPPT_GEN_ALL_CORE_FT
[facility]  NEUROLOGY [address] [place] [address]  Scheduled Appointment: 01/29/2024     [facility]  NEUROLOGY [address] [place] [address]  Scheduled Appointment: 01/29/2024

## 2024-01-05 NOTE — DISCHARGE NOTE PROVIDER - PROVIDER TOKENS
PROVIDER:[TOKEN:[[id number]],FOLLOWUP:[Routine]] PROVIDER:[TOKEN:[[id number]],FOLLOWUP:[Routine]]

## 2024-01-05 NOTE — ED PROVIDER NOTE - CLINICAL SUMMARY MEDICAL DECISION MAKING FREE TEXT BOX
COVID without hypoxia and tolerating PO. Neurologic symptoms in context of recent viral encephalitis, check lytes and neurology consult. COVID without hypoxia and tolerating PO. Neurologic symptoms in context of recent viral encephalitis, check lytes and neurology consult.    Leydricah [place], DO (PGY1):  38-year-old female, history of Hashimoto's, hemochromatosis, meningitis in November, autoimmune encephalitis in December, presenting for neuro symptoms in the setting of Covid-19 infection. Patient states on Monday she started experiencing sore throat and runny nose, did not think much of it. Her symptoms worsened as she started to experience numbness/tingling in extremities, and weakness. patient also reporting headache and vibration in her neck. She called her PCP on Wednesday and was prescribed Paxlovid due to her hx of meningitis. Patient took 2 doses of the medication.  Her neurological symptoms got worse today, which prompted her ED visit.  The numbness and tingling have since resolved.  Otherwise no fever, reports her Tmax was 99.  No chest pain, cough, nausea, vomiting, diarrhea. Neurologist is Dr. Mckeon.    Patient's vitals are stable.  She is afebrile.  On exam, patient is lying on stretcher in no acute distress.  Her lungs are clear to auscultation bilaterally.  Normal heart exam.  The posterior oropharynx is clear with no erythema or exudate.  Normal sensation in all extremities.  Strength 4/5 in all 4 extremities.  Differential diagnosis includes but is not limited to  Viral infection, electrolyte imbalance, anemia, hypothyroidism.  Low concern for meningitis given lack of the neck stiffness, fever.  Will discharge her CVA as patient is nonfocal on exam and symptoms are mostly resolved.  Will obtain labs, COVID swab.  Tylenol for pain. IV hydration. Further management and dispo pending results.

## 2024-01-05 NOTE — H&P ADULT - NSICDXPASTMEDICALHX_GEN_ALL_CORE_FT
Called patient to discuss possible procedure dates:  Left a message to return my call.  Office hours provided.   PAST MEDICAL HISTORY:  H/O Hashimoto thyroiditis     Hemochromatosis     Hypothyroidism     Viral meningitis

## 2024-01-05 NOTE — DISCHARGE NOTE PROVIDER - HOSPITAL COURSE
39yo woman with a PMHx significant for Hashimoto's and hemochromatosis who recently was also admitted to EMU for event capture in setting of migratory body jolts, numbness, tingling, HA, post-possible ?autoimmune encephalitis/viral encephalitis, d/c home with EEG with shifting temporal max slowing.  No epileptiform abn, s/p IVIG. Presented today after a few days of generalized weakness, COVID+, lightheadedness (no room spinning dizziness), occasional bilateral tingling in her upper extremities > lower extremities. Different from her previous symptoms of right sided paralysis, from the right hand extending up to her right arm along with numbness and tingling in the face and dysarthria, AMS.     Hospital course: Admitted to EMU service      39yo woman with Hashimoto's and hemochromatosis who recently admitted to EMU for event capture in setting of migratory body jolts, numbness, tingling, HA, possible ?autoimmune encephalitis/viral encephalitis, d/c home with EEG with shifting temporal max slowing.  No epileptiform abn, s/p IVIG. Presented today after a few days of generalized weakness, COVID+, lightheadedness (no room spinning dizziness), occasional bilateral tingling in her upper extremities > lower extremities. Different from her previous symptoms of right sided paralysis, from the right hand extending up to her right arm along with numbness and tingling in the face and dysarthria, AMS.     Hospital course: Admitted to EMU service and connected to vEEG monitoring. EEG showed ______.      37yo woman with Hashimoto's and hemochromatosis who recently admitted to EMU for event capture in setting of migratory body jolts, numbness, tingling, HA, possible ?autoimmune encephalitis/viral encephalitis, d/c home with EEG with shifting temporal max slowing.  No epileptiform abn, s/p IVIG. Presented today after a few days of generalized weakness, COVID+, lightheadedness (no room spinning dizziness), occasional bilateral tingling in her upper extremities > lower extremities. Different from her previous symptoms of right sided paralysis, from the right hand extending up to her right arm along with numbness and tingling in the face and dysarthria, AMS.     Impression: Generalized weakness, lightheadedness, tingling and headaches with ??normal EEG??. Symptoms likely secondary to COVID infection, possibly migrainous component as well. Lightheadedness may be a side effect of Paxlovid.     Hospital course: Admitted to EMU service and connected to vEEG monitoring. EEG showed ______. Routine labs were unremarkable other than a mild transaminitis that was noted to downtrend on the day of discharge. Tylenol was discontinued and Naproxen was prescribed for headaches.      37yo woman with Hashimoto's and hemochromatosis who recently admitted to EMU for event capture in setting of migratory body jolts, numbness, tingling, HA, possible ?autoimmune encephalitis/viral encephalitis, d/c home with EEG with shifting temporal max slowing.  No epileptiform abn, s/p IVIG. Presented today after a few days of generalized weakness, COVID+, lightheadedness (no room spinning dizziness), occasional bilateral tingling in her upper extremities > lower extremities. Different from her previous symptoms of right sided paralysis, from the right hand extending up to her right arm along with numbness and tingling in the face and dysarthria, AMS.     Prior LP CSF with 11 WBC    Impression: Generalized weakness, lightheadedness, tingling and headaches with ??normal EEG??. Symptoms likely secondary to COVID infection, possibly migrainous component as well. Lightheadedness may be a side effect of Paxlovid.     Hospital course: Admitted to neuro service . EEG again showed mild slowing . Routine labs were unremarkable other than a mild transaminitis that was noted to downtrend on the day of discharge. Tylenol was discontinued and Naproxen was prescribed for headaches.   Weakness improved p hydration.     39yo woman with Hashimoto's and hemochromatosis who recently admitted to EMU for event capture in setting of migratory body jolts, numbness, tingling, HA, possible ?autoimmune encephalitis/viral encephalitis, d/c home with EEG with shifting temporal max slowing.  No epileptiform abn, s/p IVIG. Presented today after a few days of generalized weakness, COVID+, lightheadedness (no room spinning dizziness), occasional bilateral tingling in her upper extremities > lower extremities. Different from her previous symptoms of right sided paralysis, from the right hand extending up to her right arm along with numbness and tingling in the face and dysarthria, AMS. Prior LP CSF with 11 WBC.    Impression: Generalized weakness, lightheadedness, tingling and headaches with EEG showing mild slowing, no seizures or epileptiform discharges. Symptoms likely secondary to COVID infection, possibly migrainous component as well. Lightheadedness may be a side effect of Paxlovid.     Hospital course: Admitted to neuro service. EEG again showed mild slowing. Routine labs were unremarkable other than a mild transaminitis that was noted to downtrend on the day of discharge. Tylenol was discontinued and Naproxen was prescribed for headaches. Weakness improved with hydration. The patient was cleared for discharge by the Neurology attending and will follow up as an outpatient.      37yo woman with Hashimoto's and hemochromatosis who recently admitted to EMU for event capture in setting of migratory body jolts, numbness, tingling, HA, possible ?autoimmune encephalitis/viral encephalitis, d/c home with EEG with shifting temporal max slowing.  No epileptiform abn, s/p IVIG. Presented today after a few days of generalized weakness, COVID+, lightheadedness (no room spinning dizziness), occasional bilateral tingling in her upper extremities > lower extremities. Different from her previous symptoms of right sided paralysis, from the right hand extending up to her right arm along with numbness and tingling in the face and dysarthria, AMS. Prior LP CSF with 11 WBC.    Impression: Generalized weakness, lightheadedness, tingling and headaches with EEG showing mild slowing, no seizures or epileptiform discharges. Symptoms likely secondary to COVID infection, possibly migrainous component as well. Lightheadedness may be a side effect of Paxlovid.     Hospital course: Admitted to neuro service. EEG again showed mild slowing. Routine labs were unremarkable other than a mild transaminitis that was noted to downtrend on the day of discharge. Tylenol was discontinued and Naproxen was prescribed for headaches. Weakness improved with hydration. The patient was cleared for discharge by the Neurology attending and will follow up as an outpatient.      37yo woman with Hashimoto's and hemochromatosis who recently admitted to EMU for event capture in setting of migratory body jolts, numbness, tingling, HA, possible ?viral encephalitis s/p three day course of IVIG and steroids and negative AIE and demyelinating dz panel  with EEG with shifting temporal max slowing.  Presented to the ED after a acute-subacute generalized weakness, COVID+, lightheadedness (no room spinning dizziness), paresthesias and headaches. She had a prior spinal tap on 10/23 which showed signs of inflammation increased lymphocytes and 11 TNCC but otherwise unremarkable. Was treated with sumatriptan but unable to tolerate d/t SE (chest tightness). Mild relief from Tylenol.       Hospital course: Admitted to neuro service. EEG again showed mild slowing but no seizures. Routine labs were unremarkable other than a mild transaminitis that was noted to downtrend on the day of discharge. Tylenol was discontinued and Naproxen was prescribed for headaches which seemed migrainous in nature (photosensitivity, cervicalgia). Weakness improved with hydration. LH possibly side effect of Paxlovid vs dehydration 2/2 COVID.  The patient was cleared for discharge by the Neurology attending and will follow up as an outpatient.

## 2024-01-05 NOTE — DISCHARGE NOTE PROVIDER - CARE PROVIDERS DIRECT ADDRESSES
[email or web address]

## 2024-01-05 NOTE — H&P ADULT - ASSESSMENT
Patient MORE ANDREW is a 38y (1985) woman with a PMHx significant for Hashimoto's and hemochromatosis who recently was also admitted to EMU for event capture in setting of suspected seizures 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 Different from her previous symptoms of right sided paralysis, from the right hand extending up to her right arm along with numbness and tingling in the face and dysarthria, AMS. On exam patient noted to have some giveway weakness, 4+/5 strength in UE and 4/5 strength in LEs. No numbness or tingling currently. Denies any headache.     Impression: numbness tingling, weakness episodes in setting of previous viral meningitis -Underwent full workup for encephalitis? neurological symptoms - unclear etiology, can have underlying toxic metabolic infectious encephaloapthy as well    Recommendations:  [] Admit to EMU under Dr. Siddhartha Maria  [] vEEG  [] CBC, CMP, UA, UCx, Creatine Kinase, Lactate  [] Monitor off AEDS  [] Seizure rescue medications for a generalized tonic clonic episode lasting >3 min or significant derangement of vital signs:   ---> 1st line: Ativan 2 mg IV. For GTC > 3 min and refractory to Ativan please call [phone].    Other:   [] Telemetry monitoring; Neurochecks/VS per unit protocol  [] Seizure, fall and aspiration precautions  [] Please note: if patient has a convulsion, please document time of onset, progression of limb involvement (upper/lower; R/L) if any, and specifically what patient was doing paying attention to eye opening vs closure, head turn, gaze deviation, shaking of extremities, tongue bite, urinary/bowel incontinence, any derangement of vital signs, length of episode, and duration of postictal period.    Case to be seen with attending.    Patient MORE ANDREW is a 38y (1985) woman with a PMHx significant for Hashimoto's and hemochromatosis who recently was also admitted to EMU for event capture in setting of suspected seizures 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 Different from her previous symptoms of right sided paralysis, from the right hand extending up to her right arm along with numbness and tingling in the face and dysarthria, AMS. On exam patient noted to have some giveway weakness, 4+/5 strength in UE and 4/5 strength in LEs. No numbness or tingling currently. Denies any headache.     Impression: numbness tingling, weakness episodes in setting of previous viral meningitis -Underwent full workup for encephalitis? neurological symptoms - unclear etiology, can have underlying toxic metabolic infectious encephaloapthy as well    Recommendations:  [] Admit to EMU under Dr. Siddhartha Maria  [] vEEG  [] CBC, CMP, UA, UCx, Creatine Kinase, Lactate  [] Monitor off AEDS  [] Seizure rescue medications for a generalized tonic clonic episode lasting >3 min or significant derangement of vital signs:   ---> 1st line: Ativan 2 mg IV. For GTC > 3 min and refractory to Ativan please call [phone].    Other:   [] Telemetry monitoring; Neurochecks/VS per unit protocol  [] Seizure, fall and aspiration precautions  [] Please note: if patient has a convulsion, please document time of onset, progression of limb involvement (upper/lower; R/L) if any, and specifically what patient was doing paying attention to eye opening vs closure, head turn, gaze deviation, shaking of extremities, tongue bite, urinary/bowel incontinence, any derangement of vital signs, length of episode, and duration of postictal period.    Case to be seen with attending.    Patient MORE ANDREW is a 38y (1985) woman with a PMHx significant for Hashimoto's and hemochromatosis who recently was also admitted to EMU for event capture in setting of suspected seizures 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 Different from her previous symptoms of right sided paralysis, from the right hand extending up to her right arm along with numbness and tingling in the face and dysarthria, AMS. On exam patient noted to have some giveway weakness, 4+/5 strength in UE and 4/5 strength in LEs. No numbness or tingling currently. Denies any headache.     Impression: numbness tingling, weakness episodes in setting of previous viral meningitis -Underwent full workup for encephalitis? neurological symptoms - unclear etiology, can have underlying toxic metabolic infectious encephaloapthy as well    Recommendations:  [] Admit to EMU under Dr. Siddhartha Maira  [] vEEG  [] CBC, CMP, UA, UCx, Creatine Kinase, Lactate  [] Continue COVID treatment with Paxlovid  [] Monitor off AEDS  [] Seizure rescue medications for a generalized tonic clonic episode lasting >3 min or significant derangement of vital signs:   ---> 1st line: Ativan 2 mg IV. For GTC > 3 min and refractory to Ativan please call [phone].    Other:   [] Telemetry monitoring; Neurochecks/VS per unit protocol  [] Seizure, fall and aspiration precautions  [] Please note: if patient has a convulsion, please document time of onset, progression of limb involvement (upper/lower; R/L) if any, and specifically what patient was doing paying attention to eye opening vs closure, head turn, gaze deviation, shaking of extremities, tongue bite, urinary/bowel incontinence, any derangement of vital signs, length of episode, and duration of postictal period.    Case to be seen with attending.    Patient MORE ANDREW is a 38y (1985) woman with a PMHx significant for Hashimoto's and hemochromatosis who recently was also admitted to EMU for event capture in setting of suspected seizures 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 Different from her previous symptoms of right sided paralysis, from the right hand extending up to her right arm along with numbness and tingling in the face and dysarthria, AMS. On exam patient noted to have some giveway weakness, 4+/5 strength in UE and 4/5 strength in LEs. No numbness or tingling currently. Denies any headache.     Impression: numbness tingling, weakness episodes in setting of previous viral meningitis -Underwent full workup for encephalitis? neurological symptoms - unclear etiology, can have underlying toxic metabolic infectious encephaloapthy as well    Recommendations:  [] Admit to EMU under Dr. Siddhartha Maria  [] vEEG  [] CBC, CMP, UA, UCx, Creatine Kinase, Lactate  [] Continue COVID treatment with Paxlovid  [] Monitor off AEDS  [] Seizure rescue medications for a generalized tonic clonic episode lasting >3 min or significant derangement of vital signs:   ---> 1st line: Ativan 2 mg IV. For GTC > 3 min and refractory to Ativan please call [phone].    Other:   [] Telemetry monitoring; Neurochecks/VS per unit protocol  [] Seizure, fall and aspiration precautions  [] Please note: if patient has a convulsion, please document time of onset, progression of limb involvement (upper/lower; R/L) if any, and specifically what patient was doing paying attention to eye opening vs closure, head turn, gaze deviation, shaking of extremities, tongue bite, urinary/bowel incontinence, any derangement of vital signs, length of episode, and duration of postictal period.    Case to be seen with attending.    Patient MORE ANDREW is a 38y (1985) woman with a PMHx significant for Hashimoto's and hemochromatosis who recently was also admitted to EMU recently  h/o migratory numbness, weakness, tingling, HA.  s/p possible autoimmune / viral encephalitis,s/p IVIG    Presented today after a few days of generalized weakness, COVID+, lightheadedness (no room spinning dizziness), occasional bilateral tingling in her upper extremities > lower extremities. Different from her previous symptoms of right sided paralysis, from the right hand extending up to her right arm along with numbness and tingling in the face and dysarthria, AMS. On exam patient noted to have some giveway weakness, 4+/5 strength in UE and 4/5 strength in LEs. No numbness or tingling currently. Denies any headache.     Impression: numbness tingling, weakness episodes in setting of previous ?viral/autoimmune encephalitis  neurological symptoms - generalized weakness, tingling unclear etiology, may be due  to systemic infection / COVID at this time    Recommendations:  [] Admit to EMU under Dr. Siddhartha Maria  [] vEEG  [] CBC, CMP, UA, UCx, Creatine Kinase, Lactate  [] Continue COVID treatment with Paxlovid  [] Monitor off AEDS      Other:   [] Telemetry monitoring; Neurochecks/VS per unit protocol  [] Seizure, fall and aspiration precautions  [] Please note: if patient has a convulsion, please document time of onset, progression of limb involvement (upper/lower; R/L) if any, and specifically what patient was doing paying attention to eye opening vs closure, head turn, gaze deviation, shaking of extremities, tongue bite, urinary/bowel incontinence, any derangement of vital signs, length of episode, and duration of postictal period.    Case to be seen with attending.

## 2024-01-05 NOTE — DISCHARGE NOTE PROVIDER - NSDCMRMEDTOKEN_GEN_ALL_CORE_FT
Lexapro 5 mg oral tablet: 1 tab(s) orally once a day   famotidine 20 mg oral tablet: 1 tab(s) orally 2 times a day take while using naproxen  Lexapro 5 mg oral tablet: 1 tab(s) orally once a day  naproxen 250 mg oral tablet: 1 tab(s) orally every 12 hours as needed for Moderate Pain (4 - 6)  Paxlovid 300 mg-100 mg Dose Pack oral tablet: 1 orally 2 times a day

## 2024-01-05 NOTE — ED PROVIDER NOTE - PHYSICAL EXAMINATION
CONSTITUTIONAL: Well appearing, well nourished, awake, alert, oriented to person, place, time/situation and in no apparent distress  ENMT: Airway patent, neck supple  EYES: Clear bilaterally  CARDIAC: Normal rate, regular rhythm.  RESPIRATORY: Breath sounds clear and equal bilaterally.  ABDOMEN: Abdomen soft, non-tender, no guarding  MUSCULOSKELETAL: Spine appears normal, no deformities, equal active FROM bilaterally  NEUROLOGIC: CN II-XII grossly intact, +5/5 b/l UE, +4/4 b/l LE strength, holds walls when walking for reassurance but gait normal, +rhomberg  SKIN: Exposed skin normal color for race, warm, dry and intact

## 2024-01-05 NOTE — H&P ADULT - ATTENDING COMMENTS
Agree with housestaff exam, assessment, and plan unless otherwise stated. Patient evaluated and examined in my presence, case discussed with treatment team. As per protocol, I discussed available results of testing and plan of care with the patient/advocate, who agree to the plan, including anticipated benefits of the admission, studies, procedure. Note above reviewed, further edited for accuracy, and cosigned.  Face time for evaluation, education, and counseling was >50% of time spent on unit (minutes) x: 50  Note revised as above.  ?H/o encephalitis in Oct 2023  residual HA, migratory numbness, tingling, weakness, cognitive complaints.  No evidence for seizures on EEG.  s/p IVIG in Dec  now with COVID, exacerbation of symptoms

## 2024-01-05 NOTE — ED ADULT NURSE NOTE - NSFALLFACTORS_ED_ALL_ED
OCCUPATIONAL THERAPY Initial Evaluation:    Date: 9/23/2019  Recorded diagnosis/complaint at time of admission:  There are no active hospital problems to display for this patient.    Co-morbidities:   Patient Active Problem List   Diagnosis   • Hyperlipidemia   • CAD (coronary artery disease)   • HTN (hypertension)   • Glaucoma   • Diabetes mellitus, type 2 (CMS/[facility])   • Closed fracture of shaft of humerus   • Cracked tooth   • Closed nondisplaced fracture of posterior column of acetabulum (CMS/[facility])   • Osteoporosis   • Depression with anxiety   • Vitamin D deficiency   • Nondisplaced fracture of posterior column of right acetabulum (CMS/[facility])   • Left shoulder pain   • Diabetes mellitus type 2 without retinopathy (CMS/[facility])   • Nuclear senile cataract of both eyes   • Insufficiency of tear film of both eyes   • Status post reverse total shoulder replacement, left   • CKD (chronic kidney disease) stage 3, GFR 30-59 ml/min (CMS/[facility])   • Anterior shoulder dislocation, left, initial encounter   • Tremor     Task Modification: clinical decision making of low complexity, no task modification  Treatment Diagnosis: Impaired Mobility    Therapy Precautions:  not applicable    Activity: As tolerated and up with assist    Cognition: Alert and [place] x3 and Patient required increased time to engage in session with continous redirection needed.       SUBJECTIVE:   Pt. reported agreeable to therapy and agreeable to having daughter present for session     Pt's personal goal for therapy: return home    Pain:  None Reported  None Observed     OBSERVATION:   Pt is utilizing IV, Pulse Ox, Blood Pressure Cuff and Telemetry  Skin Integrity: Impaired, LUE scratches along forearm, RUE bicep bruising  Edema: none  Collaboration with: Nurse Galvin OT    AM-PAC Outcomes  Daily Activity Domain  How much help from another person does the patient currently need?*  Task Score  Norm   1. Putting on and taking off regular lower body  clothing? 3 - A Little   2. Bathing (including washing, rinsing, drying)?   3 - A Little   3. Toileting, which includes using toilet, bedpan, or urinal? 3 - A Little   4. Putting on and taking off regular upper body clothing? 4 - None   5. Taking care of personal grooming such as brushing teeth? 3 - A Little   6. Eating meals?  4 - None   Raw Score: 20/24   Converted Score:  42.03 (Raw score = 20)   G code conversion CJ: 20-39% impairment (Raw score: 19-22)   Converted score >39.4 predictive of discharge to home  *Score based on clinical judgment/expected performance, may not have been performed during this session  Scoring Guidelines  1) Patient may use assistive devices unless otherwise indicated in question  2) Do not consider help for management of medical devices only (IV poles, catheters, NG, etc) as part of assist level  3) If patient requires device that substitutes for toileting or eating function (catheter, NG tube, PEG) they do not engage in these activities and are scored as Total  4) If activity was not observed and patient unable to do the activity, select \"Total\" .  If the patient can do the activity but was not directly observed, use professional judgment to determine how much assistance needed.      Vital Signs: not warranted at this time    ROM (degrees):  limited left UE due to hx of shoulder replacement.    Strength (out of 5 in available AROM):  UE: within functional limits    Neurological:  Coordination: intact  Sensation: intact    Balance:   Static Sitting: intact  Dynamic Sitting: intact  Static Standing: impaired SBA, use of 2ww  Dynamic Standing: impaired SBA, use of 2ww       ADLs:   Toileting:  Not addressed this session    Upper Body Bathing:   Deferred secondary to completing prior to writer arrival.     Upper Body Dressing:   Deferred secondary to completing prior to writer arrival.     Lower Body Bathing:   Deferred secondary to completing prior to writer arrival.     Lower Body  Dressing:    Not addressed this session    Hygiene/Grooming:   Deferred secondary to completing prior to writer arrival.     Self Feeding:  Not addressed this session    ADL Functional Mobility:  Not addressed this session       MOBILITY:    Bed:   Supine to Sit: Stand By Assistance (SBA)  Reason for Assistance: requires increased time to complete, weakness, fatigue    Transfers:   Device Used: gait belt, 2 wheeled walker  Sit to Stand: Stand By Assistance (SBA)  Stand to Sit: Stand By Assistance (SBA)  Stand Pivot: Stand By Assistance (SBA)  Reason for Assistance: requires increased time to complete, weakness, verbal cues for generate upward force from seated surface, fatigue    Ambulation:   Assistance Level: Stand By Assistance (SBA)  Distance: 40+40 feet  Device Used: gait belt and 2 wheeled walker  Reason for Assistance: requires increased time to complete, weakness, fatigue    Exercises:  Not addressed this session     Activity Tolerance: limited by fatigue     GOALS:     Rehab potential is good due to positive factors age, family support, pain level and negative factors motivation level, activity tolerance    Review Date: 9/30/2019  1. Patient will complete upper body bathing with Modified [place] (mod I).  Goal not met  2. Patient will complete upper body dressing with Modified [place] (mod I).  Goal not met  3. Patient will complete lower body bathing with Modified [place] (mod I).  Goal not met  4. Patient will complete lower body dressing with Minimal Assistance (min A).  Goal not met  5. Patient will complete toileting with Modified [place] (mod I).  Goal not met  6. Patient will complete tub/shower transfer with Minimal Assistance (min A).  Goal not met  7. Patient will complete hygiene/grooming with Modified [place] (mod I).  Goal not met  8. Patient will complete item retrieval with Modified [place] (mod I). Goal not met      PLAN OF CARE:    OT Frequency: 7  days/week  Duration: LOS  Treatment Interventions: ADL retraining, Functional transfer training, Endurance training, Compensatory technique education     RECOMMENDATIONS/EDUCATION:    Learner: patient and patient's family  Readiness to Learn: acceptance  Learning Method: Verbal  Barriers to Learning: no barriers apparent at this time  Learning Evaluation: Verbalizes understanding, Demonstrates understanding and Needs reinforcement  Teaching Content: Positioning, Equipment, Safety Awareness, Functional Mobility and Role of occupational therapy in the hospital setting  Please reference the patient education activity for further information regarding the patient's learning assessment.  Equipment for discharge: to be determined - continuing to assess needs at this time     Total Time Spent With Patient: 40 minutes    Treatment Plan for Next Session: toileting,  ADL training and sinkside bathing    The plan of care and goals were established with the patient and she is in agreement.     ASSESSMENT:      Patient presents to occupational therapy below baseline functional mobility level of modified independent. Patient is demonstrating decreased strength, diminished safety awareness, decreased activity tolerance, decreased endurance which is limiting the completion of all ADLs and all functional mobility at this time.  Further skilled occupational therapy is required to address these limitations in attempt to maximize the patient's independence.      Recommendations for Discharge: OT: Home                    After today's session this therapist is recommending the above location for optimal discharge.  This recommendation is supported by at initial evaluation  patient scored a 42.03 on the Am-Pac Activity domain, which indicates patient is safe to return home . Patient has available assistance at all times. Patient's  is at home during the AM and daughter and granddaughter are present in the PM.         Weakness

## 2024-01-05 NOTE — EEG REPORT - NS EEG TEXT BOX
Patient Name: More Andrew    Age: 38 year, : 1985  MRN #: - MR# [id number]  Walters: - [address]  Referring Physician: -  MADINA admit            Study Started: 2024 10:28:47 AM  Study Ended: 2024 16:55:59   -------------------------------------------------------------------------------------------------------------------------------------------------------  STUDY INFORMATION:    EEG Recording Technique:  The patient underwent continuous Video-EEG monitoring, using Telemetry System hardware on the XLTek Digital System. EEG and video data were stored on a computer hard drive with important events saved in digital archive files. The material was reviewed by a physician (electroencephalographer / epileptologist) on a daily basis. Deon and seizure detection algorithms were utilized and reviewed. An EEG Technician attended to the patient, and was available throughout daytime work hours.  The epilepsy center neurologist was available in person or on call 24-hours per day.    EEG Placement and Labeling of Electrodes:  The EEG was performed utilizing 20 channel referential EEG connections (coronal over temporal over parasagittal montage) using all standard 10-20 electrode placements with EKG, with additional electrodes placed in the inferior temporal region using the modified 10-10 montage electrode placements for elective admissions, or if deemed necessary. Recording was at a sampling rate of 256 samples per second per channel. Time synchronized digital video recording was done simultaneously with EEG recording. A low light infrared camera was used for low light recording.     -------------------------------------------------------------------------------------------------------------------------------------------------------  HISTORY:  Patient MORE ANDREW is a 38y (1985) woman with a PMHx significant for Hashimoto's and hemochromatosis who presented after a few days of generalized weakness, COVID+, occasional bilateral tingling in her upper extremities > lower extremities.     Home Antiepileptic Medication and Device  None    -------------------------------------------------------------------------------------------------------------------------------------------------------  INTERPRETATION:    DAY 1 	START: 2024  10:28:47 AM     	END: 2024 16:55:59  	DURATION: 6 HR  19  MIN    DAILY EEG VISUAL ANALYSIS    The background was continuous, symmetric, spontaneously variable and reactive. During wakefulness, the posterior dominant rhythm consisted of symmetric, well-modulated 11 Hz activity, with amplitude to 30 uV, that attenuated to eye opening.  Low amplitude frontal beta was noted in wakefulness.   The anterior to posterior gradient was present.     BACKGROUND SLOWING:  No generalized background slowing was present.    FOCAL SLOWING:   None was present.    SLEEP BACKGROUND:  Drowsiness was characterized by fragmentation, attenuation, and slowing of the background activity.    Sleep was characterized by the presence of vertex waves, symmetric sleep spindles and K-complexes.    OTHER NON-EPILEPTIFORM FINDINGS:  None were present.    ACTIVATION PROCEDURES:   Hyperventilation was not performed.    Photic stimulation was not performed.    INTERICTAL EPILEPTIFORM ACTIVITY:   None were present.    EVENTS:  No events or seizures recorded.    ARTIFACTS:  Intermittent myogenic and movement artifacts were noted.    ECG:  The heart rate on single channel ECG was predominantly between 60-80 BPM.    ASMs:   None  -------------------------------------------------------------------------------------------------------------------------------------------------------  EEG SUMMARY:  Normal EEG in the awake, drowsy and asleep states.    -------------------------------------------------------------------------------------------------------------------------------------------------------  IMPRESSION/CLINICAL CORRELATE:  This is a normal VEEG. No epileptiform pattern or seizures were recorded    -------------------------------------------------------------------------------------------------------------------------------------------------------  Mayo Segundo MD  Fellow, [facility]   Patient Name: More Andrew    Age: 38 year, : 1985  MRN #: - MR# [id number]  Walters: - [address]  Referring Physician: -  MADINA admit            Study Started: 2024 10:28:47 AM  Study Ended: 2024 16:55:59   -------------------------------------------------------------------------------------------------------------------------------------------------------  STUDY INFORMATION:    EEG Recording Technique:  The patient underwent continuous Video-EEG monitoring, using Telemetry System hardware on the XLTek Digital System. EEG and video data were stored on a computer hard drive with important events saved in digital archive files. The material was reviewed by a physician (electroencephalographer / epileptologist) on a daily basis. Deon and seizure detection algorithms were utilized and reviewed. An EEG Technician attended to the patient, and was available throughout daytime work hours.  The epilepsy center neurologist was available in person or on call 24-hours per day.    EEG Placement and Labeling of Electrodes:  The EEG was performed utilizing 20 channel referential EEG connections (coronal over temporal over parasagittal montage) using all standard 10-20 electrode placements with EKG, with additional electrodes placed in the inferior temporal region using the modified 10-10 montage electrode placements for elective admissions, or if deemed necessary. Recording was at a sampling rate of 256 samples per second per channel. Time synchronized digital video recording was done simultaneously with EEG recording. A low light infrared camera was used for low light recording.     -------------------------------------------------------------------------------------------------------------------------------------------------------  HISTORY:  Patient MORE ANDREW is a 38y (1985) woman with a PMHx significant for Hashimoto's and hemochromatosis who presented after a few days of generalized weakness, COVID+, occasional bilateral tingling in her upper extremities > lower extremities.     Home Antiepileptic Medication and Device  None    -------------------------------------------------------------------------------------------------------------------------------------------------------  INTERPRETATION:    DAY 1 	START: 2024  10:28:47 AM     	END: 2024 16:55:59  	DURATION: 6 HR  19  MIN    DAILY EEG VISUAL ANALYSIS    The background was continuous, symmetric, spontaneously variable and reactive. During wakefulness, the posterior dominant rhythm consisted of symmetric, well-modulated 11 Hz activity, with amplitude to 30 uV, that attenuated to eye opening.  Low amplitude frontal beta was noted in wakefulness.   The anterior to posterior gradient was present.     BACKGROUND SLOWING:  No generalized background slowing was present.    FOCAL SLOWING:   None was present.    SLEEP BACKGROUND:  Drowsiness was characterized by fragmentation, attenuation, and slowing of the background activity.    Sleep was characterized by the presence of vertex waves, symmetric sleep spindles and K-complexes.    OTHER NON-EPILEPTIFORM FINDINGS:  None were present.    ACTIVATION PROCEDURES:   Hyperventilation was not performed.    Photic stimulation was not performed.    INTERICTAL EPILEPTIFORM ACTIVITY:   None were present.    EVENTS:  No events or seizures recorded.    ARTIFACTS:  Intermittent myogenic and movement artifacts were noted.    ECG:  The heart rate on single channel ECG was predominantly between 60-80 BPM.    ASMs:   None  -------------------------------------------------------------------------------------------------------------------------------------------------------  EEG SUMMARY:  Normal EEG in the awake, drowsy and asleep states.    -------------------------------------------------------------------------------------------------------------------------------------------------------  IMPRESSION/CLINICAL CORRELATE:  This is a normal VEEG. No epileptiform pattern or seizures were recorded    -------------------------------------------------------------------------------------------------------------------------------------------------------  Mayo Segundo MD  Fellow, [facility]   Patient Name: More Malhotra    Age: 38 year, : 1985  MRN #: - MR# [id number]  Walters: - [address]  Referring Physician: -  MADINA admit            Study Started: 2024 10:28:47 AM  Study Ended: 2024 16:55:59   -------------------------------------------------------------------------------------------------------------------------------------------------------  STUDY INFORMATION:    EEG Recording Technique:  The patient underwent continuous Video-EEG monitoring, using Telemetry System hardware on the XLTek Digital System. EEG and video data were stored on a computer hard drive with important events saved in digital archive files. The material was reviewed by a physician (electroencephalographer / epileptologist) on a daily basis. Deon and seizure detection algorithms were utilized and reviewed. An EEG Technician attended to the patient, and was available throughout daytime work hours.  The epilepsy center neurologist was available in person or on call 24-hours per day.    EEG Placement and Labeling of Electrodes:  The EEG was performed utilizing 20 channel referential EEG connections (coronal over temporal over parasagittal montage) using all standard 10-20 electrode placements with EKG, with additional electrodes placed in the inferior temporal region using the modified 10-10 montage electrode placements for elective admissions, or if deemed necessary. Recording was at a sampling rate of 256 samples per second per channel. Time synchronized digital video recording was done simultaneously with EEG recording. A low light infrared camera was used for low light recording.     -------------------------------------------------------------------------------------------------------------------------------------------------------  HISTORY:  Patient MORE MALHOTRA is a 38y (1985) woman with a PMHx significant for Hashimoto's and hemochromatosis who presented after a few days of generalized weakness, COVID+, occasional bilateral tingling in her upper extremities > lower extremities.     Home Antiepileptic Medication and Device  None    -------------------------------------------------------------------------------------------------------------------------------------------------------  INTERPRETATION:    DAY 1 	START: 2024  10:28:47 AM     	END: 2024 16:55:59  	DURATION: 6 HR  19  MIN    DAILY EEG VISUAL ANALYSIS    The background was continuous, symmetric, spontaneously variable and reactive. During wakefulness, the posterior dominant rhythm consisted of symmetric, well-modulated 11 Hz activity, with amplitude to 30 uV, that attenuated to eye opening.  Low amplitude frontal beta was noted in wakefulness.   The anterior to posterior gradient was present.     BACKGROUND SLOWING:  No generalized background slowing was present.    FOCAL SLOWING:   None was present.    SLEEP BACKGROUND:  Drowsiness was characterized by fragmentation, attenuation, and slowing of the background activity.    Sleep was characterized by the presence of vertex waves, symmetric sleep spindles and K-complexes.    OTHER NON-EPILEPTIFORM FINDINGS:  None were present.    ACTIVATION PROCEDURES:   Hyperventilation was not performed.    Photic stimulation was not performed.    INTERICTAL EPILEPTIFORM ACTIVITY:   None were present.    EVENTS:  No events or seizures recorded.    ARTIFACTS:  Intermittent myogenic and movement artifacts were noted.    ECG:  The heart rate on single channel ECG was predominantly between 60-80 BPM.    ASMs:   None  -------------------------------------------------------------------------------------------------------------------------------------------------------  EEG SUMMARY:  Normal EEG in the awake, drowsy and asleep states.    -------------------------------------------------------------------------------------------------------------------------------------------------------  IMPRESSION/CLINICAL CORRELATE:  This is a normal VEEG. No epileptiform pattern or seizures were recorded    -------------------------------------------------------------------------------------------------------------------------------------------------------  Mayo Segundo MD  Fellow, [facility] [place]    Maykel Hernandez MD  Neurology Attending Physician     Patient Name: More Malhotra    Age: 38 year, : 1985  MRN #: - MR# [id number]  [place]ters: - [address]  Referring Physician: -  MADINA admit            Study Started: 2024 10:28:47 AM  Study Ended: 2024 16:55:59   -------------------------------------------------------------------------------------------------------------------------------------------------------  STUDY INFORMATION:    EEG Recording Technique:  The patient underwent continuous Video-EEG monitoring, using Telemetry System hardware on the XLTek Digital System. EEG and video data were stored on a computer hard drive with important events saved in digital archive files. The material was reviewed by a physician (electroencephalographer / epileptologist) on a daily basis. Deon and seizure detection algorithms were utilized and reviewed. An EEG Technician attended to the patient, and was available throughout daytime work hours.  The epilepsy center neurologist was available in person or on call 24-hours per day.    EEG Placement and Labeling of Electrodes:  The EEG was performed utilizing 20 channel referential EEG connections (coronal over temporal over parasagittal montage) using all standard 10-20 electrode placements with EKG, with additional electrodes placed in the inferior temporal region using the modified 10-10 montage electrode placements for elective admissions, or if deemed necessary. Recording was at a sampling rate of 256 samples per second per channel. Time synchronized digital video recording was done simultaneously with EEG recording. A low light infrared camera was used for low light recording.     -------------------------------------------------------------------------------------------------------------------------------------------------------  HISTORY:  Patient MORE MALHOTRA is a 38y (1985) woman with a PMHx significant for Hashimoto's and hemochromatosis who presented after a few days of generalized weakness, COVID+, occasional bilateral tingling in her upper extremities > lower extremities.     Home Antiepileptic Medication and Device  None    -------------------------------------------------------------------------------------------------------------------------------------------------------  INTERPRETATION:    DAY 1 	START: 2024  10:28:47 AM     	END: 2024 16:55:59  	DURATION: 6 HR  19  MIN    DAILY EEG VISUAL ANALYSIS    The background was continuous, symmetric, spontaneously variable and reactive. During wakefulness, the posterior dominant rhythm consisted of symmetric, well-modulated 11 Hz activity, with amplitude to 30 uV, that attenuated to eye opening.  Low amplitude frontal beta was noted in wakefulness.   The anterior to posterior gradient was present.     BACKGROUND SLOWING:  No generalized background slowing was present.    FOCAL SLOWING:   None was present.    SLEEP BACKGROUND:  Drowsiness was characterized by fragmentation, attenuation, and slowing of the background activity.    Sleep was characterized by the presence of vertex waves, symmetric sleep spindles and K-complexes.    OTHER NON-EPILEPTIFORM FINDINGS:  None were present.    ACTIVATION PROCEDURES:   Hyperventilation was not performed.    Photic stimulation was not performed.    INTERICTAL EPILEPTIFORM ACTIVITY:   None were present.    EVENTS:  No events or seizures recorded.    ARTIFACTS:  Intermittent myogenic and movement artifacts were noted.    ECG:  The heart rate on single channel ECG was predominantly between 60-80 BPM.    ASMs:   None  -------------------------------------------------------------------------------------------------------------------------------------------------------  EEG SUMMARY:  Normal EEG in the awake, drowsy and asleep states.    -------------------------------------------------------------------------------------------------------------------------------------------------------  IMPRESSION/CLINICAL CORRELATE:  This is a normal VEEG. No epileptiform pattern or seizures were recorded    -------------------------------------------------------------------------------------------------------------------------------------------------------  Mayo Segundo MD  Fellow, [facility] [place]    Maykel Hernandez MD  Neurology Attending Physician

## 2024-01-05 NOTE — DISCHARGE NOTE NURSING/CASE MANAGEMENT/SOCIAL WORK - PATIENT PORTAL LINK FT
You can access the FollowMyHealth Patient Portal offered by [facility] [place] by registering at the following website: [email or web address]. By joining [facility]’s FollowMyHealth portal, you will also be able to view your health information using other applications (apps) compatible with our system. You can access the FollowMyHealth Patient Portal offered by [facility] by registering at the following website: [email or web address]. By joining [employer]’s FollowMyHealth portal, you will also be able to view your health information using other applications (apps) compatible with our system.

## 2024-01-05 NOTE — ED ADULT NURSE NOTE - NSFALLRISKINTERV_ED_ALL_ED
Bed in lowest position, wheels locked, appropriate side rails in place/Call bell, personal items and telephone in reach/Physically safe environment - no spills, clutter or unnecessary equipment/Purposeful Proactive Rounding/Room/bathroom lighting operational, light cord in reach

## 2024-01-05 NOTE — DISCHARGE NOTE PROVIDER - NSDCCPCAREPLAN_GEN_ALL_CORE_FT
PRINCIPAL DISCHARGE DIAGNOSIS  Diagnosis: Numbness and tingling sensation of skin  Assessment and Plan of Treatment: You were admitted to the EMU due to intermittent numbness and tingling sensation. Upon admission continuous EEG was performed and showed ______.  You will continue taking ______. Follow up with Dr. Lincoln as scheduled. Follow up with your primary care doctor in 1-2 weeks.     PRINCIPAL DISCHARGE DIAGNOSIS  Diagnosis: Numbness and tingling sensation of skin  Assessment and Plan of Treatment: You were admitted to the EMU due to intermittent lightheadedness and numbness and tingling sensation. Upon admission continuous EEG was performed and showed no seizures or epileptiform discharges.  You were prescribed Naproxen for headaches. While taking Naproxen please also take Pepcid as prescribed to avoid stomach irritation. Follow up with Dr. Lincoln as scheduled. Follow up with your primary care doctor in 1-2 weeks.     PRINCIPAL DISCHARGE DIAGNOSIS  Diagnosis: Numbness and tingling sensation of skin  Assessment and Plan of Treatment: You were admitted to the EMU due to intermittent lightheadedness and numbness and tingling sensation. Upon admission continuous EEG was performed and showed no seizures or epileptiform discharges.  You were prescribed Naproxen for headaches. While taking Naproxen please also take Pepcid as prescribed to avoid stomach irritation. Follow up with Dr. Lincoln as scheduled. Follow up with your primary care doctor in 1-2 weeks.      SECONDARY DISCHARGE DIAGNOSES  Diagnosis: Acute migraine  Assessment and Plan of Treatment: You were treated for migraines with Naproxen. Please follow up with Neurology for further managemnt as outpatient.    Diagnosis: Transaminitis  Assessment and Plan of Treatment:     Diagnosis: Lightheadedness  Assessment and Plan of Treatment:     Diagnosis: CSF abnormal  Assessment and Plan of Treatment:

## 2024-01-05 NOTE — H&P ADULT - NSHPPHYSICALEXAM_GEN_ALL_CORE
Mental status - Awake, Alert, Oriented to person, place, and time. Speech fluent, repetition and naming intact. Follows simple and complex commands. Attention/concentration, recent and remote memory (including registration and recall), and fund of knowledge intact    Cranial nerves - PERRLA, VFF, EOMI, face sensation (V1-V3) intact b/l, facial strength intact without asymmetry b/l, hearing intact b/l, palate with symmetric elevation, trapezius  5/5 strength b/l, tongue midline on protrusion with full lateral movement    Motor - Normal bulk and tone throughout. No pronator drift.  Strength testing            Deltoid      Biceps      Triceps     Wrist Extension    Wrist Flexion     Interossei         R            4+               4+               5                     5                              5                        5                 5  L             4+               4+              5                     5                              5                        5                 5              Hip Flexion    Hip Extension    Knee Flexion    Knee Extension    Dorsiflexion    Plantar Flexion  R              4- *                          4                       4                       4                           5                          5  L              4-                          4                       4                        4                            5                          5  Giveway weakness possibly  Sensation - Light touch/temperature OR pain/vibration intact throughout    DTR's -             Biceps      Triceps     Brachioradialis      Patellar    Ankle    Toes/plantar response  R             2+             2+                  2+                       2+            2+                 Down  L              2+             2+                 2+                        2+           2+                 Down    Coordination - Finger to Nose intact b/l. No tremors appreciated

## 2024-01-12 ENCOUNTER — RX RENEWAL (OUTPATIENT)
Age: 39
End: 2024-01-12

## 2024-01-13 ENCOUNTER — APPOINTMENT (OUTPATIENT)
Dept: INTERNAL MEDICINE | Facility: CLINIC | Age: 39
End: 2024-01-13
Payer: COMMERCIAL

## 2024-01-13 ENCOUNTER — LABORATORY RESULT (OUTPATIENT)
Age: 39
End: 2024-01-13

## 2024-01-13 VITALS
HEIGHT: 62 IN | OXYGEN SATURATION: 98 % | TEMPERATURE: 98 F | BODY MASS INDEX: 24.84 KG/M2 | SYSTOLIC BLOOD PRESSURE: 108 MMHG | HEART RATE: 86 BPM | WEIGHT: 135 LBS | RESPIRATION RATE: 14 BRPM | DIASTOLIC BLOOD PRESSURE: 66 MMHG

## 2024-01-13 DIAGNOSIS — F41.9 ANXIETY DISORDER, UNSPECIFIED: ICD-10-CM

## 2024-01-13 PROCEDURE — 99213 OFFICE O/P EST LOW 20 MIN: CPT

## 2024-01-13 RX ORDER — HYDROXYZINE PAMOATE 25 MG/1
25 CAPSULE ORAL
Qty: 30 | Refills: 1 | Status: ACTIVE | COMMUNITY
Start: 2023-11-18 | End: 1900-01-01

## 2024-01-13 RX ORDER — NIRMATRELVIR AND RITONAVIR 300-100 MG
20 X 150 MG & KIT ORAL
Qty: 1 | Refills: 0 | Status: DISCONTINUED | COMMUNITY
Start: 2024-01-03 | End: 2024-01-13

## 2024-01-13 NOTE — HISTORY OF PRESENT ILLNESS
[FreeTextEntry8] : 38F presents for follow-up after being admitted for COVID. Since discharge, reports diarrhea, N/V. Denies fever. She has reduced escitalopram to 2.5mg daily as she is concerned it is causing side effects.

## 2024-01-13 NOTE — PHYSICAL EXAM
[No Acute Distress] : no acute distress [Normal Sclera/Conjunctiva] : normal sclera/conjunctiva [PERRL] : pupils equal round and reactive to light [EOMI] : extraocular movements intact [No Lymphadenopathy] : no lymphadenopathy [Supple] : supple [No Respiratory Distress] : no respiratory distress  [No Accessory Muscle Use] : no accessory muscle use [Clear to Auscultation] : lungs were clear to auscultation bilaterally [Normal Rate] : normal rate  [Regular Rhythm] : with a regular rhythm [No Murmur] : no murmur heard [Normal S1, S2] : normal S1 and S2 [Soft] : abdomen soft [Non Tender] : non-tender [Non-distended] : non-distended [Normal Bowel Sounds] : normal bowel sounds [Normal Posterior Cervical Nodes] : no posterior cervical lymphadenopathy [Normal Anterior Cervical Nodes] : no anterior cervical lymphadenopathy [de-identified] : no rebound or guarding

## 2024-01-13 NOTE — REVIEW OF SYSTEMS
[Fever] : no fever [Chills] : no chills [Chest Pain] : no chest pain [Palpitations] : no palpitations [Lower Ext Edema] : no lower extremity edema [Shortness Of Breath] : no shortness of breath [Wheezing] : no wheezing [Cough] : no cough [Dyspnea on Exertion] : no dyspnea on exertion [Dysuria] : no dysuria

## 2024-01-15 LAB
ALBUMIN SERPL ELPH-MCNC: 4.8 G/DL
ALP BLD-CCNC: 65 U/L
ALT SERPL-CCNC: 21 U/L
ANION GAP SERPL CALC-SCNC: 15 MMOL/L
APPEARANCE: CLEAR
AST SERPL-CCNC: 18 U/L
BASOPHILS # BLD AUTO: 0.05 K/UL
BASOPHILS NFR BLD AUTO: 0.7 %
BILIRUB SERPL-MCNC: 0.4 MG/DL
BILIRUBIN URINE: NEGATIVE
BLOOD URINE: ABNORMAL
BUN SERPL-MCNC: 7 MG/DL
CALCIUM SERPL-MCNC: 10.3 MG/DL
CHLORIDE SERPL-SCNC: 102 MMOL/L
CO2 SERPL-SCNC: 21 MMOL/L
COLOR: YELLOW
CREAT SERPL-MCNC: 0.74 MG/DL
EGFR: 106 ML/MIN/1.73M2
EOSINOPHIL # BLD AUTO: 0.03 K/UL
EOSINOPHIL NFR BLD AUTO: 0.4 %
GLUCOSE QUALITATIVE U: NEGATIVE MG/DL
GLUCOSE SERPL-MCNC: 86 MG/DL
HCT VFR BLD CALC: 38 %
HGB BLD-MCNC: 12.8 G/DL
IMM GRANULOCYTES NFR BLD AUTO: 0.1 %
KETONES URINE: NEGATIVE MG/DL
LEUKOCYTE ESTERASE URINE: NEGATIVE
LYMPHOCYTES # BLD AUTO: 2.3 K/UL
LYMPHOCYTES NFR BLD AUTO: 30.1 %
MAN DIFF?: NORMAL
MCHC RBC-ENTMCNC: 30.8 PG
MCHC RBC-ENTMCNC: 33.7 GM/DL
MCV RBC AUTO: 91.3 FL
MONOCYTES # BLD AUTO: 0.61 K/UL
MONOCYTES NFR BLD AUTO: 8 %
NEUTROPHILS # BLD AUTO: 4.63 K/UL
NEUTROPHILS NFR BLD AUTO: 60.7 %
NITRITE URINE: NEGATIVE
PH URINE: 7
PLATELET # BLD AUTO: 369 K/UL
POTASSIUM SERPL-SCNC: 4.8 MMOL/L
PROT SERPL-MCNC: 8 G/DL
PROTEIN URINE: NEGATIVE MG/DL
RBC # BLD: 4.16 M/UL
RBC # FLD: 12 %
SODIUM SERPL-SCNC: 139 MMOL/L
SPECIFIC GRAVITY URINE: 1.01
UROBILINOGEN URINE: 0.2 MG/DL
WBC # FLD AUTO: 7.63 K/UL

## 2024-01-29 ENCOUNTER — APPOINTMENT (OUTPATIENT)
Dept: NEUROLOGY | Facility: CLINIC | Age: 39
End: 2024-01-29
Payer: COMMERCIAL

## 2024-01-29 VITALS
WEIGHT: 130 LBS | BODY MASS INDEX: 23.92 KG/M2 | HEART RATE: 88 BPM | DIASTOLIC BLOOD PRESSURE: 68 MMHG | SYSTOLIC BLOOD PRESSURE: 106 MMHG | HEIGHT: 62 IN

## 2024-01-29 DIAGNOSIS — G93.49 AUTOIMMUNE THYROIDITIS: ICD-10-CM

## 2024-01-29 DIAGNOSIS — E06.3 AUTOIMMUNE THYROIDITIS: ICD-10-CM

## 2024-01-29 PROCEDURE — G2211 COMPLEX E/M VISIT ADD ON: CPT

## 2024-01-29 PROCEDURE — 99214 OFFICE O/P EST MOD 30 MIN: CPT

## 2024-02-03 NOTE — ASSESSMENT
[FreeTextEntry1] : MORE ANDREW HAS A CLINICAL HISTORY SUGGESTIVE OF PROBABLE AIE( Hashimoto thyroiditis as predisposing factor) now improving post IVIG/solumedrol x1  plan: continue clinical surveillance only no need for cronic immunotherapy now, unless her symptoms recurs.

## 2024-02-03 NOTE — HISTORY OF PRESENT ILLNESS
[FreeTextEntry1] : *** 01/29/2024 ***  MORE ANDREW is here for f/u . she was admitted and then readmitted in the hospital for covid positive. her first admission showed wbc 11 and mildly elevated proteins recerived ivig and solumedrol has a hitory of hashimoto tyroiditis w TPO in 13,000 now 500. 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 her complex history is as bellow:   She is a 38 year old right handed lady.  She presents today for evaluation of headaches. She has a medical history of Hashimoto's and hemochromatosis. She has no prior hx of migraines.  On 10/16/23, she was feeling unwell, run down and off balance. She had an episode where she felt her ears were popping, dizzy, and lightheaded. Her eyes were sensitive to light. It came on rapidly; she then developed paralysis from her right hand up to arm up to face, and slurred speech, and then she couldn't formulate thoughts. She was admitted to [facility] and was told she had viral meningitis. She was treated w antiviral and sent home.  The episodes continued; she presented to [place] twice. Workup was unremarkable. The migraine cocktail helped. She was also admitted to [facility] with severe headaches. She was readmitted a few days later with chest pain, from the Sumatriptan.  She has not had a headache in over a week. She is no longer taking migraine medications.  She continues to experience recurrent episodes of paralysis; can be on either side and can be on both sides. The episodes can range from 10 minutes to 2.5 hours; sometimes it just affects her hand/arm and other times it can be the whole side of her body. When it comes on it's a tingling sensation that she can feel spreading; w/ in minutes feels the full paralysis. She has at least one episode a day; most frequently at night time. She also occasionally feels weakness on one side of the body. During one episode, she endorsed shaking/convulsions.  Her prior workup includes: - MRI brain with and without contrast: Unremarkable pre and postcontrast enhanced MRI of the brain. - MRV brain: Unremarkable. No evidence of dural sinus thrombosis. - CTA head and neck: Unremarkable. 3mm right thyroid nodule. - Lyme serology: negative. EEG: The findings may be suggestive of focal cerebral dysfunction. Clinical  correlation and correlation with neuroimaging is recommended. No epileptiform activity was seen and no clinical events or seizures were  recorded. Consider a repeat study if clinically indicated.  PCP Dr. Hernandez

## 2024-02-03 NOTE — DISCUSSION/SUMMARY
[FreeTextEntry1] : MORE ANDREW is here for initial evaluation. she had an acute event, possibly viral/ inflammatory and she is recovering. slowly. she [id number] to have stereotype events, unclear etiology, possibly epileptic.  plan: EMU admission for event capture and medication management.

## 2024-02-03 NOTE — REASON FOR VISIT
[Post Hospitalization] : a post hospitalization visit [Parent] : parent [Initial Evaluation] : an initial evaluation [FreeTextEntry1] : headaches

## 2024-02-03 NOTE — PHYSICAL EXAM
[General Appearance - Alert] : alert [General Appearance - In No Acute Distress] : in no acute distress [Oriented To Time, Place, And Person] : oriented to person, place, and time [Impaired Insight] : insight and judgment were intact [Affect] : the affect was normal [Sclera] : the sclera and conjunctiva were normal [Extraocular Movements] : extraocular movements were intact [Full Visual Field] : full visual field [Outer Ear] : the ears and nose were normal in appearance [Examination Of The Oral Cavity] : the lips and gums were normal [Neck Appearance] : the appearance of the neck was normal [Auscultation Breath Sounds / Voice Sounds] : lungs were clear to auscultation bilaterally [Heart Sounds] : normal S1 and S2 [Edema] : there was no peripheral edema [Veins - Varicosity Changes] : there were no varicosital changes [Abnormal Walk] : normal gait [Nail Clubbing] : no clubbing  or cyanosis of the fingernails [Musculoskeletal - Swelling] : no joint swelling seen [Motor Tone] : muscle strength and tone were normal [Skin Color & Pigmentation] : normal skin color and pigmentation [Skin Turgor] : normal skin turgor [] : no rash [FreeTextEntry1] :  Neurologic examination:  Mental status:  The patient is alert, attentive, and oriented. Speech is clear and fluent with good comprehension.  Cranial nerves:  CN II: Visual fields are full to confrontation.   CN III, IV, VI: At primary gaze, there is no eye deviation.EOMI. No ptosis  CN V: Facial sensation is intact bilaterally.  CN VII: Face is symmetric with normal eye closure and smile.  CN VII: Hearing is grossly intact.  CN IX, X: Palate elevates symmetrically. Phonation is normal.  CN XI: Head turning and shoulder shrug are intact  CN XII: Tongue is midline with normal movements and no atrophy.  Motor:  There is no pronator drift of out-stretched arms. Muscle bulk and tone are normal. Strength is full bilaterally.  Sensory:  Light touch intact in fingers and toes.  Coordination:  Fine finger movements are intact. There is no dysmetria on finger-to-nose.    Gait/Stance:  Gait and tandem gait are normal. Romberg is absent.  Reflexes 1-2 arms, 2+ knees,  plantar reflexes withdrawal on the left and silent on the right.

## 2024-04-29 ENCOUNTER — APPOINTMENT (OUTPATIENT)
Dept: NEUROLOGY | Facility: CLINIC | Age: 39
End: 2024-04-29
Payer: COMMERCIAL

## 2024-04-29 VITALS
SYSTOLIC BLOOD PRESSURE: 108 MMHG | WEIGHT: 130 LBS | DIASTOLIC BLOOD PRESSURE: 73 MMHG | HEIGHT: 62 IN | BODY MASS INDEX: 23.92 KG/M2 | HEART RATE: 82 BPM

## 2024-04-29 DIAGNOSIS — Z91.89 OTHER SPECIFIED PERSONAL RISK FACTORS, NOT ELSEWHERE CLASSIFIED: ICD-10-CM

## 2024-04-29 DIAGNOSIS — E06.3 AUTOIMMUNE THYROIDITIS: ICD-10-CM

## 2024-04-29 PROCEDURE — 99214 OFFICE O/P EST MOD 30 MIN: CPT

## 2024-05-01 PROBLEM — E06.3 HASHIMOTO'S DISEASE: Status: ACTIVE | Noted: 2023-11-18

## 2024-05-01 PROBLEM — Z91.89 AT RISK FOR SEIZURES: Status: ACTIVE | Noted: 2023-12-06

## 2024-05-01 NOTE — DISCUSSION/SUMMARY
[FreeTextEntry1] : MORE ANDREW is here for initial evaluation. she had an acute event, possibly viral/ inflammatory and she is recovering. slowly. she [id number] to have stereotype events, unclear etiology, possibly epileptic.  plan: EMU admission for event capture and medication management.

## 2024-05-01 NOTE — HISTORY OF PRESENT ILLNESS
[FreeTextEntry1] : *** 01/29/2024 ***  MORE ANDREW is here for f/u . she was admitted and then readmitted in the hospital for covid positive. her first admission showed wbc 11 and mildly elevated proteins recerived ivig and solumedrol has a hitory of hashimoto tyroiditis w TPO in 13,000 now 500. 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 her complex history is as bellow:   She is a 38 year old right handed lady.  She presents today for evaluation of headaches. She has a medical history of Hashimoto's and hemochromatosis. She has no prior hx of migraines.  On 10/16/23, she was feeling unwell, run down and off balance. She had an episode where she felt her ears were popping, dizzy, and lightheaded. Her eyes were sensitive to light. It came on rapidly; she then developed paralysis from her right hand up to arm up to face, and slurred speech, and then she couldn't formulate thoughts. She was admitted to [facility] and was told she had viral meningitis. She was treated w antiviral and sent home.  The episodes continued; she presented to [place] twice. Workup was unremarkable. The migraine cocktail helped. She was also admitted to [facility] with severe headaches. She was readmitted a few days later with chest pain, from the Sumatriptan.  She has not had a headache in over a week. She is no longer taking migraine medications.  She continues to experience recurrent episodes of paralysis; can be on either side and can be on both sides. The episodes can range from 10 minutes to 2.5 hours; sometimes it just affects her hand/arm and other times it can be the whole side of her body. When it comes on it's a tingling sensation that she can feel spreading; w/ in minutes feels the full paralysis. She has at least one episode a day; most frequently at night time. She also occasionally feels weakness on one side of the body. During one episode, she endorsed shaking/convulsions.  Her prior workup includes: - MRI brain with and without contrast: Unremarkable pre and postcontrast enhanced MRI of the brain. - MRV brain: Unremarkable. No evidence of dural sinus thrombosis. - CTA head and neck: Unremarkable. 3mm right thyroid nodule. - Lyme serology: negative. EEG: The findings may be suggestive of focal cerebral dysfunction. Clinical  correlation and correlation with neuroimaging is recommended. No epileptiform activity was seen and no clinical events or seizures were  recorded. Consider a repeat study if clinically indicated.  PCP Dr. Hernandez

## 2024-05-01 NOTE — PHYSICAL EXAM
[FreeTextEntry1] :  Neurologic examination:  Mental status:  The patient is alert, attentive, and oriented. Speech is clear and fluent with good comprehension.  Cranial nerves:  CN II: Visual fields are full to confrontation.   CN III, IV, VI: At primary gaze, there is no eye deviation.EOMI. No ptosis  CN V: Facial sensation is intact bilaterally.  CN VII: Face is symmetric with normal eye closure and smile.  CN VII: Hearing is grossly intact.  CN IX, X: Palate elevates symmetrically. Phonation is normal.  CN XI: Head turning and shoulder shrug are intact  CN XII: Tongue is midline with normal movements and no atrophy.  Motor:  There is no pronator drift of out-stretched arms. Muscle bulk and tone are normal. Strength is full bilaterally.  Sensory:  Light touch intact in fingers and toes.  Coordination:  Fine finger movements are intact. There is no dysmetria on finger-to-nose.    Gait/Stance:  Gait and tandem gait are normal. Romberg is absent.  Reflexes 1-2 arms, 2+ knees,  plantar reflexes withdrawal on the left and silent on the right. [General Appearance - Alert] : alert [Oriented To Time, Place, And Person] : oriented to person, place, and time [General Appearance - In No Acute Distress] : in no acute distress [Impaired Insight] : insight and judgment were intact [Affect] : the affect was normal [Sclera] : the sclera and conjunctiva were normal [Extraocular Movements] : extraocular movements were intact [Full Visual Field] : full visual field [Outer Ear] : the ears and nose were normal in appearance [Examination Of The Oral Cavity] : the lips and gums were normal [Neck Appearance] : the appearance of the neck was normal [Auscultation Breath Sounds / Voice Sounds] : lungs were clear to auscultation bilaterally [Heart Sounds] : normal S1 and S2 [Edema] : there was no peripheral edema [Veins - Varicosity Changes] : there were no varicosital changes [Abnormal Walk] : normal gait [Nail Clubbing] : no clubbing  or cyanosis of the fingernails [Musculoskeletal - Swelling] : no joint swelling seen [Motor Tone] : muscle strength and tone were normal [Skin Color & Pigmentation] : normal skin color and pigmentation [Skin Turgor] : normal skin turgor [] : no rash

## 2024-05-16 ENCOUNTER — APPOINTMENT (OUTPATIENT)
Dept: INTERNAL MEDICINE | Facility: CLINIC | Age: 39
End: 2024-05-16
Payer: COMMERCIAL

## 2024-05-16 VITALS
BODY MASS INDEX: 23.92 KG/M2 | RESPIRATION RATE: 14 BRPM | HEART RATE: 70 BPM | TEMPERATURE: 99.4 F | WEIGHT: 130 LBS | DIASTOLIC BLOOD PRESSURE: 80 MMHG | SYSTOLIC BLOOD PRESSURE: 120 MMHG | OXYGEN SATURATION: 98 % | HEIGHT: 62 IN

## 2024-05-16 DIAGNOSIS — U07.1 COVID-19: ICD-10-CM

## 2024-05-16 DIAGNOSIS — E06.3 AUTOIMMUNE THYROIDITIS: ICD-10-CM

## 2024-05-16 DIAGNOSIS — E83.119 HEMOCHROMATOSIS, UNSPECIFIED: ICD-10-CM

## 2024-05-16 PROCEDURE — G2211 COMPLEX E/M VISIT ADD ON: CPT | Mod: NC,1L

## 2024-05-16 PROCEDURE — 99214 OFFICE O/P EST MOD 30 MIN: CPT

## 2024-05-16 NOTE — PHYSICAL EXAM
[No Acute Distress] : no acute distress [Normal Sclera/Conjunctiva] : normal sclera/conjunctiva [PERRL] : pupils equal round and reactive to light [EOMI] : extraocular movements intact [No Respiratory Distress] : no respiratory distress  [No Accessory Muscle Use] : no accessory muscle use [Clear to Auscultation] : lungs were clear to auscultation bilaterally [Normal Rate] : normal rate  [Regular Rhythm] : with a regular rhythm [Normal S1, S2] : normal S1 and S2 [No Murmur] : no murmur heard [No Edema] : there was no peripheral edema [Soft] : abdomen soft [Non Tender] : non-tender [Non-distended] : non-distended [Normal Bowel Sounds] : normal bowel sounds [Normal Posterior Cervical Nodes] : no posterior cervical lymphadenopathy [Normal Anterior Cervical Nodes] : no anterior cervical lymphadenopathy

## 2024-05-16 NOTE — ASSESSMENT
[FreeTextEntry1] : Hashimoto's thyroiditis: Check TSH, Free T4, T3, TPO. Will discuss results.  Hemochromatosis: Check CBC, iron, ferritin. She will f/u with Dr Roberts.

## 2024-05-16 NOTE — HISTORY OF PRESENT ILLNESS
[de-identified] : 38F presents for follow-up of hashimoto's thyroiditis and hemochromatosis. She is due for bloodwork.

## 2024-05-16 NOTE — REVIEW OF SYSTEMS
[Fever] : no fever [Chills] : no chills [Night Sweats] : no night sweats [Discharge] : no discharge [Vision Problems] : no vision problems [Itching] : no itching [Earache] : no earache [Nasal Discharge] : no nasal discharge [Sore Throat] : no sore throat [Chest Pain] : no chest pain [Palpitations] : no palpitations [Lower Ext Edema] : no lower extremity edema [Shortness Of Breath] : no shortness of breath [Wheezing] : no wheezing [Cough] : no cough [Dyspnea on Exertion] : no dyspnea on exertion [Abdominal Pain] : no abdominal pain [Nausea] : no nausea [Constipation] : no constipation [Diarrhea] : diarrhea [Vomiting] : no vomiting [Dysuria] : no dysuria [Headache] : no headache [Dizziness] : no dizziness [Fainting] : no fainting [Unsteady Walking] : no ataxia

## 2024-05-21 LAB
BASOPHILS # BLD AUTO: 0.04 K/UL
BASOPHILS NFR BLD AUTO: 0.4 %
EOSINOPHIL # BLD AUTO: 0.15 K/UL
EOSINOPHIL NFR BLD AUTO: 1.6 %
FERRITIN SERPL-MCNC: 26 NG/ML
HCT VFR BLD CALC: 39 %
HGB BLD-MCNC: 12.9 G/DL
IMM GRANULOCYTES NFR BLD AUTO: 0.2 %
IRON SATN MFR SERPL: 24 %
IRON SERPL-MCNC: 77 UG/DL
LYMPHOCYTES # BLD AUTO: 2.86 K/UL
LYMPHOCYTES NFR BLD AUTO: 31.1 %
MAN DIFF?: NORMAL
MCHC RBC-ENTMCNC: 30.2 PG
MCHC RBC-ENTMCNC: 33.1 GM/DL
MCV RBC AUTO: 91.3 FL
MONOCYTES # BLD AUTO: 0.55 K/UL
MONOCYTES NFR BLD AUTO: 6 %
NEUTROPHILS # BLD AUTO: 5.57 K/UL
NEUTROPHILS NFR BLD AUTO: 60.7 %
PLATELET # BLD AUTO: 293 K/UL
RBC # BLD: 4.27 M/UL
RBC # FLD: 12.2 %
T3 SERPL-MCNC: 94 NG/DL
T4 FREE SERPL-MCNC: 1.1 NG/DL
THYROPEROXIDASE AB SERPL IA-ACNC: 765 IU/ML
TIBC SERPL-MCNC: 321 UG/DL
TSH SERPL-ACNC: 4.51 UIU/ML
UIBC SERPL-MCNC: 244 UG/DL
WBC # FLD AUTO: 9.19 K/UL

## 2024-08-25 NOTE — ED PROVIDER NOTE - ATTENDING CONTRIBUTION TO CARE
. 35 yo F p/w was having R flank pain, starting 5 days ago, was seen at urgent care, and david neg outpt CT. Now pt with L sided flank pain, some dysuria. No cp/sob/palp. no fever/chills. No dysuria/ hematuria. No neck / back pain. no agg/allev factors. no recent trauma. No weakness / dizziness. NO other inj or co.  exam: MM MOist. neck supple. no meningeal signs. ABd soft, Min tend LLQ, no darwin / guard, no cvat. No other acute findings.  CT neg from urgent care  Check labs, UA, CT w IV cont (new sx, inc pain) / US, otpt fu

## 2024-08-27 LAB
T3 SERPL-MCNC: 80 NG/DL
T4 FREE SERPL-MCNC: 1.1 NG/DL
THYROPEROXIDASE AB SERPL IA-ACNC: 772 IU/ML
TSH SERPL-ACNC: 4.6 UIU/ML

## 2024-09-19 ENCOUNTER — APPOINTMENT (OUTPATIENT)
Dept: ENDOCRINOLOGY | Facility: CLINIC | Age: 39
End: 2024-09-19
Payer: COMMERCIAL

## 2024-09-19 ENCOUNTER — APPOINTMENT (OUTPATIENT)
Dept: ENDOCRINOLOGY | Facility: CLINIC | Age: 39
End: 2024-09-19

## 2024-09-19 VITALS
BODY MASS INDEX: 33.13 KG/M2 | WEIGHT: 180 LBS | HEIGHT: 62 IN | SYSTOLIC BLOOD PRESSURE: 114 MMHG | HEART RATE: 82 BPM | OXYGEN SATURATION: 98 % | DIASTOLIC BLOOD PRESSURE: 80 MMHG

## 2024-09-19 DIAGNOSIS — E06.3 AUTOIMMUNE THYROIDITIS: ICD-10-CM

## 2024-09-19 DIAGNOSIS — G93.49 AUTOIMMUNE THYROIDITIS: ICD-10-CM

## 2024-09-19 PROCEDURE — 99204 OFFICE O/P NEW MOD 45 MIN: CPT

## 2024-09-19 RX ORDER — LEVOTHYROXINE SODIUM 0.05 MG/1
50 TABLET ORAL DAILY
Qty: 90 | Refills: 1 | Status: ACTIVE | COMMUNITY
Start: 2024-09-19 | End: 1900-01-01

## 2024-09-19 NOTE — ASSESSMENT
[Levothyroxine] : The patient was instructed to take Levothyroxine on an empty stomach, separate from vitamins, and wait at least 30 minutes before eating [FreeTextEntry1] : h/o Hashimoto, subclinical hypothyroidism Was on levothyroxine in past, stopped in 2023 Noted review of recent TFTs, TSH mildly elevated at 4.6 Discussed may not be necesasry to trend TPO Ab, we know she has h/o Hashimoto background for life, levels will fluctaute and she may be prone to developing overt hypothyroidism.  Discussed indications for levothyroxine medication with patient today. Discussed indications, benefits and potential side effects with patient today, patient agreed to try.  Start levothyroxine 50mcg daily, will aim TSH goal <2.5 as patient is planning possible future pregnancy Will repeat TFTs around 10/31/24 to ensure this dose is at goal   Answered all questions today; patient verbalized understanding of the above RTO in 6 months w/ covering endocrine provider as I will be out on maternity leave

## 2024-09-19 NOTE — HISTORY OF PRESENT ILLNESS
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 She was hospitalized in Oct 2024 and thought to have viral meningitis with sporadic paralysis and headaches. She recalls she used to be on levothyroxine but stopped in 2023. She has bene doing research and now following gluten free diet which seems to have helped. Notably her thyroid peroxidase antibody titers have decreased since inital hospitalization.  Of note she is planning to get  in Nov 2024 and planning to conceive a baby.  Since then, she notes most of her symptoms have resolved or are improving.  Patient denies heat or cold intolerance, denies dyspnea, dysphagia, dysphonia. No previous thyroid sono done in past She denies change in menstrual cycles (cycles are regular, last 3-5 days, LMP is 8/19/24). She has no h/o previous pregnancies or pregnancy losses,   Family Hx: 2 maternal aunts- +hashimoto, hypo and hyperthyroidism

## 2024-09-19 NOTE — PHYSICAL EXAM
[Alert] : alert [No Acute Distress] : no acute distress [Well Developed] : well developed [Normal Sclera/Conjunctiva] : normal sclera/conjunctiva [EOMI] : extra ocular movement intact [No Proptosis] : no proptosis [No Lid Lag] : no lid lag [No LAD] : no lymphadenopathy [Supple] : the neck was supple [Thyroid Not Enlarged] : the thyroid was not enlarged [No Thyroid Nodules] : no palpable thyroid nodules [No Respiratory Distress] : no respiratory distress [No Accessory Muscle Use] : no accessory muscle use [Normal Rate and Effort] : normal respiratory rate and effort [No Involuntary Movements] : no involuntary movements were seen [No Tremors] : no tremors [Oriented x3] : oriented to person, place, and time [Normal Insight/Judgement] : insight and judgment were intact

## 2024-09-25 ENCOUNTER — APPOINTMENT (OUTPATIENT)
Dept: ULTRASOUND IMAGING | Facility: CLINIC | Age: 39
End: 2024-09-25

## 2024-09-25 PROCEDURE — 76536 US EXAM OF HEAD AND NECK: CPT

## 2024-09-28 ENCOUNTER — INPATIENT (INPATIENT)
Facility: HOSPITAL | Age: 39
LOS: 0 days | Discharge: HOME | DRG: 103 | End: 2024-09-29
Attending: PSYCHIATRY & NEUROLOGY | Admitting: PSYCHIATRY & NEUROLOGY
Payer: COMMERCIAL

## 2024-09-28 VITALS
DIASTOLIC BLOOD PRESSURE: 78 MMHG | TEMPERATURE: 98 F | HEART RATE: 82 BPM | SYSTOLIC BLOOD PRESSURE: 118 MMHG | OXYGEN SATURATION: 98 % | RESPIRATION RATE: 16 BRPM

## 2024-09-28 DIAGNOSIS — R53.1 WEAKNESS: ICD-10-CM

## 2024-09-28 LAB
ALBUMIN SERPL ELPH-MCNC: 4.3 G/DL — SIGNIFICANT CHANGE UP (ref 3.3–5)
ALBUMIN SERPL ELPH-MCNC: 4.6 G/DL — SIGNIFICANT CHANGE UP (ref 3.3–5)
ALP SERPL-CCNC: 71 U/L — SIGNIFICANT CHANGE UP (ref 40–120)
ALP SERPL-CCNC: 82 U/L — SIGNIFICANT CHANGE UP (ref 40–120)
ALT FLD-CCNC: 11 U/L — SIGNIFICANT CHANGE UP (ref 10–45)
ALT FLD-CCNC: 9 U/L — LOW (ref 10–45)
ANION GAP SERPL CALC-SCNC: 13 MMOL/L — SIGNIFICANT CHANGE UP (ref 5–17)
ANION GAP SERPL CALC-SCNC: 8 MMOL/L — SIGNIFICANT CHANGE UP (ref 5–17)
APPEARANCE UR: CLEAR — SIGNIFICANT CHANGE UP
APTT BLD: 31.9 SEC — SIGNIFICANT CHANGE UP (ref 24.5–35.6)
AST SERPL-CCNC: 14 U/L — SIGNIFICANT CHANGE UP (ref 10–40)
AST SERPL-CCNC: 8 U/L — LOW (ref 10–40)
BACTERIA # UR AUTO: NEGATIVE /HPF — SIGNIFICANT CHANGE UP
BASOPHILS # BLD AUTO: 0.06 K/UL — SIGNIFICANT CHANGE UP (ref 0–0.2)
BASOPHILS NFR BLD AUTO: 0.8 % — SIGNIFICANT CHANGE UP (ref 0–2)
BILIRUB SERPL-MCNC: 0.2 MG/DL — SIGNIFICANT CHANGE UP (ref 0.2–1.2)
BILIRUB SERPL-MCNC: 0.2 MG/DL — SIGNIFICANT CHANGE UP (ref 0.2–1.2)
BILIRUB UR-MCNC: NEGATIVE — SIGNIFICANT CHANGE UP
BUN SERPL-MCNC: 11 MG/DL — SIGNIFICANT CHANGE UP (ref 7–23)
BUN SERPL-MCNC: 12 MG/DL — SIGNIFICANT CHANGE UP (ref 7–23)
CALCIUM SERPL-MCNC: 10.1 MG/DL — SIGNIFICANT CHANGE UP (ref 8.4–10.5)
CALCIUM SERPL-MCNC: 9.3 MG/DL — SIGNIFICANT CHANGE UP (ref 8.4–10.5)
CAST: 0 /LPF — SIGNIFICANT CHANGE UP (ref 0–4)
CHLORIDE SERPL-SCNC: 107 MMOL/L — SIGNIFICANT CHANGE UP (ref 96–108)
CHLORIDE SERPL-SCNC: 107 MMOL/L — SIGNIFICANT CHANGE UP (ref 96–108)
CHOLEST SERPL-MCNC: 203 MG/DL — HIGH
CO2 SERPL-SCNC: 22 MMOL/L — SIGNIFICANT CHANGE UP (ref 22–31)
CO2 SERPL-SCNC: 25 MMOL/L — SIGNIFICANT CHANGE UP (ref 22–31)
COLOR SPEC: YELLOW — SIGNIFICANT CHANGE UP
CREAT SERPL-MCNC: 0.85 MG/DL — SIGNIFICANT CHANGE UP (ref 0.5–1.3)
CREAT SERPL-MCNC: 0.87 MG/DL — SIGNIFICANT CHANGE UP (ref 0.5–1.3)
DIFF PNL FLD: NEGATIVE — SIGNIFICANT CHANGE UP
EGFR: 87 ML/MIN/1.73M2 — SIGNIFICANT CHANGE UP
EGFR: 89 ML/MIN/1.73M2 — SIGNIFICANT CHANGE UP
EOSINOPHIL # BLD AUTO: 0.25 K/UL — SIGNIFICANT CHANGE UP (ref 0–0.5)
EOSINOPHIL NFR BLD AUTO: 3.2 % — SIGNIFICANT CHANGE UP (ref 0–6)
GLUCOSE SERPL-MCNC: 117 MG/DL — HIGH (ref 70–99)
GLUCOSE SERPL-MCNC: 129 MG/DL — HIGH (ref 70–99)
GLUCOSE UR QL: NEGATIVE MG/DL — SIGNIFICANT CHANGE UP
HCG SERPL-ACNC: <2 MIU/ML — SIGNIFICANT CHANGE UP
HCT VFR BLD CALC: 36.5 % — SIGNIFICANT CHANGE UP (ref 34.5–45)
HCT VFR BLD CALC: 40.5 % — SIGNIFICANT CHANGE UP (ref 34.5–45)
HDLC SERPL-MCNC: 48 MG/DL — LOW
HGB BLD-MCNC: 12.3 G/DL — SIGNIFICANT CHANGE UP (ref 11.5–15.5)
HGB BLD-MCNC: 13.6 G/DL — SIGNIFICANT CHANGE UP (ref 11.5–15.5)
IMM GRANULOCYTES NFR BLD AUTO: 0.1 % — SIGNIFICANT CHANGE UP (ref 0–0.9)
INR BLD: 0.88 RATIO — SIGNIFICANT CHANGE UP (ref 0.85–1.16)
KETONES UR-MCNC: NEGATIVE MG/DL — SIGNIFICANT CHANGE UP
LEUKOCYTE ESTERASE UR-ACNC: NEGATIVE — SIGNIFICANT CHANGE UP
LIPID PNL WITH DIRECT LDL SERPL: 146 MG/DL — HIGH
LYMPHOCYTES # BLD AUTO: 3.84 K/UL — HIGH (ref 1–3.3)
LYMPHOCYTES # BLD AUTO: 49.5 % — HIGH (ref 13–44)
MCHC RBC-ENTMCNC: 29.8 PG — SIGNIFICANT CHANGE UP (ref 27–34)
MCHC RBC-ENTMCNC: 30.1 PG — SIGNIFICANT CHANGE UP (ref 27–34)
MCHC RBC-ENTMCNC: 33.6 GM/DL — SIGNIFICANT CHANGE UP (ref 32–36)
MCHC RBC-ENTMCNC: 33.7 GM/DL — SIGNIFICANT CHANGE UP (ref 32–36)
MCV RBC AUTO: 88.6 FL — SIGNIFICANT CHANGE UP (ref 80–100)
MCV RBC AUTO: 89.2 FL — SIGNIFICANT CHANGE UP (ref 80–100)
MONOCYTES # BLD AUTO: 0.52 K/UL — SIGNIFICANT CHANGE UP (ref 0–0.9)
MONOCYTES NFR BLD AUTO: 6.7 % — SIGNIFICANT CHANGE UP (ref 2–14)
NEUTROPHILS # BLD AUTO: 3.08 K/UL — SIGNIFICANT CHANGE UP (ref 1.8–7.4)
NEUTROPHILS NFR BLD AUTO: 39.7 % — LOW (ref 43–77)
NITRITE UR-MCNC: NEGATIVE — SIGNIFICANT CHANGE UP
NON HDL CHOLESTEROL: 156 MG/DL — HIGH
NRBC # BLD: 0 /100 WBCS — SIGNIFICANT CHANGE UP (ref 0–0)
NRBC # BLD: 0 /100 WBCS — SIGNIFICANT CHANGE UP (ref 0–0)
PH UR: 8 — SIGNIFICANT CHANGE UP (ref 5–8)
PLATELET # BLD AUTO: 250 K/UL — SIGNIFICANT CHANGE UP (ref 150–400)
PLATELET # BLD AUTO: 304 K/UL — SIGNIFICANT CHANGE UP (ref 150–400)
POTASSIUM SERPL-MCNC: 3.5 MMOL/L — SIGNIFICANT CHANGE UP (ref 3.5–5.3)
POTASSIUM SERPL-MCNC: 4.1 MMOL/L — SIGNIFICANT CHANGE UP (ref 3.5–5.3)
POTASSIUM SERPL-SCNC: 3.5 MMOL/L — SIGNIFICANT CHANGE UP (ref 3.5–5.3)
POTASSIUM SERPL-SCNC: 4.1 MMOL/L — SIGNIFICANT CHANGE UP (ref 3.5–5.3)
PROLACTIN SERPL-MCNC: 46.5 NG/ML — HIGH (ref 3.4–24.1)
PROT SERPL-MCNC: 6.6 G/DL — SIGNIFICANT CHANGE UP (ref 6–8.3)
PROT SERPL-MCNC: 7.4 G/DL — SIGNIFICANT CHANGE UP (ref 6–8.3)
PROT UR-MCNC: NEGATIVE MG/DL — SIGNIFICANT CHANGE UP
PROTHROM AB SERPL-ACNC: 10.1 SEC — SIGNIFICANT CHANGE UP (ref 9.9–13.4)
RBC # BLD: 4.09 M/UL — SIGNIFICANT CHANGE UP (ref 3.8–5.2)
RBC # BLD: 4.57 M/UL — SIGNIFICANT CHANGE UP (ref 3.8–5.2)
RBC # FLD: 11.8 % — SIGNIFICANT CHANGE UP (ref 10.3–14.5)
RBC # FLD: 11.9 % — SIGNIFICANT CHANGE UP (ref 10.3–14.5)
RBC CASTS # UR COMP ASSIST: 0 /HPF — SIGNIFICANT CHANGE UP (ref 0–4)
SODIUM SERPL-SCNC: 140 MMOL/L — SIGNIFICANT CHANGE UP (ref 135–145)
SODIUM SERPL-SCNC: 142 MMOL/L — SIGNIFICANT CHANGE UP (ref 135–145)
SP GR SPEC: 1.01 — SIGNIFICANT CHANGE UP (ref 1–1.03)
SQUAMOUS # UR AUTO: 0 /HPF — SIGNIFICANT CHANGE UP (ref 0–5)
TRIGL SERPL-MCNC: 54 MG/DL — SIGNIFICANT CHANGE UP
TROPONIN T, HIGH SENSITIVITY RESULT: <6 NG/L — SIGNIFICANT CHANGE UP (ref 0–51)
TSH SERPL-MCNC: 3.35 UIU/ML — SIGNIFICANT CHANGE UP (ref 0.27–4.2)
UROBILINOGEN FLD QL: 0.2 MG/DL — SIGNIFICANT CHANGE UP (ref 0.2–1)
WBC # BLD: 7.35 K/UL — SIGNIFICANT CHANGE UP (ref 3.8–10.5)
WBC # BLD: 7.76 K/UL — SIGNIFICANT CHANGE UP (ref 3.8–10.5)
WBC # FLD AUTO: 7.35 K/UL — SIGNIFICANT CHANGE UP (ref 3.8–10.5)
WBC # FLD AUTO: 7.76 K/UL — SIGNIFICANT CHANGE UP (ref 3.8–10.5)
WBC UR QL: 0 /HPF — SIGNIFICANT CHANGE UP (ref 0–5)

## 2024-09-28 PROCEDURE — 70496 CT ANGIOGRAPHY HEAD: CPT | Mod: 26,MC

## 2024-09-28 PROCEDURE — 70553 MRI BRAIN STEM W/O & W/DYE: CPT | Mod: 26

## 2024-09-28 PROCEDURE — 70450 CT HEAD/BRAIN W/O DYE: CPT | Mod: 26,59,MC

## 2024-09-28 PROCEDURE — 99291 CRITICAL CARE FIRST HOUR: CPT

## 2024-09-28 PROCEDURE — 95720 EEG PHY/QHP EA INCR W/VEEG: CPT

## 2024-09-28 PROCEDURE — 99223 1ST HOSP IP/OBS HIGH 75: CPT

## 2024-09-28 PROCEDURE — 0042T: CPT | Mod: MC

## 2024-09-28 PROCEDURE — 70498 CT ANGIOGRAPHY NECK: CPT | Mod: 26,MC

## 2024-09-28 RX ORDER — SODIUM CHLORIDE 0.9 % (FLUSH) 0.9 %
10 SYRINGE (ML) INJECTION ONCE
Refills: 0 | Status: DISCONTINUED | OUTPATIENT
Start: 2024-09-28 | End: 2024-09-28

## 2024-09-28 RX ORDER — TENECTEPLASE 50 MG
19 KIT INTRAVENOUS ONCE
Refills: 0 | Status: DISCONTINUED | OUTPATIENT
Start: 2024-09-28 | End: 2024-09-28

## 2024-09-28 RX ORDER — ESCITALOPRAM OXALATE 10 MG
5 TABLET ORAL DAILY
Refills: 0 | Status: DISCONTINUED | OUTPATIENT
Start: 2024-09-28 | End: 2024-09-29

## 2024-09-28 RX ORDER — FAMOTIDINE 40 MG
20 TABLET ORAL
Refills: 0 | Status: DISCONTINUED | OUTPATIENT
Start: 2024-09-28 | End: 2024-09-29

## 2024-09-28 RX ORDER — ENOXAPARIN SODIUM 150 MG/ML
40 INJECTION SUBCUTANEOUS EVERY 24 HOURS
Refills: 0 | Status: DISCONTINUED | OUTPATIENT
Start: 2024-09-28 | End: 2024-09-29

## 2024-09-28 RX ADMIN — ENOXAPARIN SODIUM 40 MILLIGRAM(S): 150 INJECTION SUBCUTANEOUS at 06:25

## 2024-09-28 RX ADMIN — Medication 20 MILLIGRAM(S): at 06:25

## 2024-09-28 NOTE — ED PROVIDER NOTE - CLINICAL SUMMARY MEDICAL DECISION MAKING FREE TEXT BOX
Attending Radha:  37yo woman with Hashimoto's and hemochromatosis presents to the ed w/ cc of rue/rle weakness. last known well at 23:30 9/28, noted a pop sensation on r head with rue weakness, and facial nubmness, no cp, sob, pt states " I have had viral meningitis with weakkness like this but on both sides". denies ha, f/c, cp, sob, palpitations, some feeling of off balance, no difficulty finding words, afebrile vitals stable  non toxic well appearing, NC/AT,  conjunctiva non conjected, sclera anicteric, moist mucous membranes, neck supple, heart sounds, normal, no mrg, lungs cta b/l no wrr, abd soft non distended w/ no tenderness, no visual deformities of extremities, axox3, no facial asymmetry, cn2-xii gi, power rue 3/5, rle 3/5, 5/5 l side, unable to assess gait,  , normal mood and affect, concern for stroke pt made stroke alert, wll get cta head/neck, plan to further review hx .

## 2024-09-28 NOTE — ED PROVIDER NOTE - PHYSICAL EXAMINATION
Exam:   General: Non toxic, well appearing  HENT: No pharyngeal erythema/exudate, external ear exam normal  Eyes: PEERL, EOMI  Lungs: CTA B/L, no wheezing, no rales, no ronchi  Chest: Normal Heart sounds, no murmurs, no rubs no gallops  Abdomen: Soft, no tenderness, no rigidity, no guarding, neg Rovsing's signs, neg tenderness at Mckburney's point  MSK: FROM of joints, no tenderness to palpation  Skin: No new rashes or lesions  Neuro: Alert and oriented x 3, power 5/5 left side, 3/5 rue, 3/5 rle CN II-XII GI, no facial asymmetry, no cerebellar findings

## 2024-09-28 NOTE — STROKE CODE NOTE - NSSTROKEABCD2TIACLINICAL_GEN_A_CORE
"[facility] VOICE [facility]INIC  THERAPY NOTE (CPT 48599)    Patient: Ramesh Michelle  Date of Service: 2/1/2018  Referring physician: Dr. 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  Today's evaluation demonstrates consistent findings with his April 17th examination, though diffuse swelling noted is consistent with more recent phonotrauma.\"    SUBJECTIVE:  Since his last session, Mr. Lyle Villaseñor reports the following:     Overall he reports that symptoms are improved    Hurdles:  Some mornings he notes a rougher voice, eecently during a rehearsal he felt like he wore his voice down slightly singing \"loud\"    Patient arrived 15 minutes late for scheduled appointment    OBJECTIVE:  PATIENT REPORTED MEASURES:  Not Applicable    0 Disagree strongly  1 Disagree    2 Neither agree nor disagree  3 Agree    4 Agree strongly    5   Since my last session, I used the speech therapy exercises and strategies as recommended by my speech pathologist. 4   I feel that using my therapy techniques has become a habit. 3   I feel confident in my ability to manage my current and future symptoms. 4      Greatly worsened  -2 Worsened  -1 Stayed the same  0 Improved   1 Greatly improved  2      Since my last session I feel my symptoms have ________. 1      Overall, since starting therapy I feel my symptoms are ________. 5   Worse  (see below) About the same  0 Better  (see below)   -1 Almost the same, hardly worse at all  -2 A little worse  -3 Somewhat worse  -4 Moderately worse  -5 A good deal worse  -6 A great deal worse  -7 A very great deal worse 1 Almost the same, hardly better at all  2 A little better  3 Somewhat better  4 Moderately better  5 A good deal better  6 A great deal better  7 A very " great deal better     Question Session Date    12/19/17 2/1/18          Since your last session how would you rate the quality of your voice on average? 8-9  8-9         0 - 10 scale in which 10 represents patient s best possible voice and 0 represents no voice at all   Since your last session how would you rate the effort of using your voice on average? 3  2-3         0 - 10 scale in which 10 represents maximal effort and 0 represents no effort at all     THERAPEUTIC ACTIVITIES    Patient demonstrated warmup regimen    consistent airflow    Tendency to push or strain on elevated F0    Alternative semi-occluded postures were trialed including /m/ which provided clear results    Consistent break between modal and loft register    Conversational training therapy    To address patient's recognition of rough speaking voice conversational training therapy was reviewed and reinstructed as needed    He was able to alternate between target and habitual speaking voice with minimal clinician cues    Following this point improved speaking voice was noted for the remainder of the session    Exercises to promote forward resonance and coordination of respiration and phonation    The patient solo from an upcoming choir concert was used as stimulus     Pressed phonation noted    Nonoptimal patterns of breath grouping    Reduced tongue base tension and forward resonance were facilitative for improving quality and patient's reported ease    Counseling and education    The potential for non-optimal voice quality in the short-term to allow for optimal recovery was discussed    Ongoing work with his  for technical work was encouraged      A revised regimen for home practice was instructed.    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  He continues to feel that he is able to meet his vocal demands, though access to upper registration frequently feels either forced or breathy and unfocused.  Forward resonant tasks were utilized today to try and find a balance between these sensations to good effect, but it was stressed that he would need to practice consistently in multiple contacts in order for this to become habituated.  Despite this he feels as though he is meeting his vocal demands effectively.    PLAN: I will see Mr. Lyle Villaseñor in 1 month, at which point we will re-visualize the larynx to assess physiologic progress to date.       TOTAL SERVICE TIME: 60 minutes  TREATMENT (97795): 60 minutes  NO CHARGE FACILITY FEE (51202)    Grant Giordano M.M., M.A., CCC-SLP  Speech-Language Pathologist  Certificate of Vocology  [phone]     Unilateral weakness

## 2024-09-28 NOTE — H&P ADULT - TIME BILLING
39-year-old right-handed lady evaluated at [facility] on 9/28/2024 with right hemiparesis.  History and exam as above, with minor changes.  ROS otherwise negative.  Exam.  Alert and attentive; right side: Drift of arm and leg; manual motor testing with marked variability in effort and give way, but probably at least 4/5 throughout; sensory-mildly decreased light touch on right; gait not tested; remainder of neurologic exam was nonfocal.  CT head/CTA neck and head/CTP (9/27/2024) to my eye were unremarkable.    Impression.  In late 2023, she had an episode of right hemiparesis with associated headache and an LP which showed lymphocytic pleocytosis (WBC = 11) without other abnormalities.  Neurovascular workup was negative.  She was diagnosed with "viral meningitis" and her deficits resolved within an uncertain interval of time..  Shortly after that, she presented with a left hemiparesis, headache and again a negative workup.  She now presents with a recurrent right hemiparesis which has already improved since admission.  She denied hammad headache on this occasion, but she did have an unusual "popping" sensation in her right retroauricular region as well as numbness affecting the right side of her head.  Her presentation on this occasion localizes to the left side of brain.  Etiology remains uncertain, but there is no evidence of ischemic stroke.  She has been worked up for seizures in the past by Dr. Lincoln, with EEG showing left temporal slowing, but no evidence of seizures.  Hemiplegic migraine is another possibility.  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  There may be an element of functional overlay as well.  I doubt that she will benefit from further neurovascular investigation.  Plan.  Discussed with her neurologist, Dr. Lincoln, who will assume care for this patient; PT/OT; further management as per Dr. Lincoln.

## 2024-09-28 NOTE — H&P ADULT - HISTORY OF PRESENT ILLNESS
38 yo R handed female w PMHx of hashimoto's and viral meningitis 2023 presenting as code stroke for R side weakness and numbness. Per patient and , patient woke up around 0000 because a painful ear popping sensation work her. She had gone to bed at 2330. After the popping sensation she experienced severe numbness of weakness start from her R face then go to her arm then to her leg. The patient and her  report that this episode is quite similar to her episodes of neurologic deficit when she was diagnosed with autoimmune encephalitis in 10/2023. The patient reported that previously she had had ear popping then weakness on the L that progress to involve the right and she had a previous episode of ear popping then R side weakness. The patient presentation was discussed with the stroke attending and the patient clinical presentation though localizable seems more consistent with her previous encephalitis sequelae so tenecteplase was NOT given.    IV-tenecteplase(Y/N):                N  Reason IV-tenecteplase not given:  The patient presentation was discussed with the stroke attending and the patient clinical presentation though localizable seems more consistent with her previous encephalitis sequelae so tenecteplase was NOT given.      On chart review: information taken from Dr. Lincoln note 04/26/2024  ----on 1/29/2024: patient noted to have been admitted then readmitted in the hospital for (+) covid. Work up revealed wbc 11 and mildly elevated protein. She received IVIG and solumedrol. It was suspected at that time that her Hashimoto's has played a predisposing factor in her autoimmune encephalitis  ----on 11/28/2023: Patient evaluated for HA during f/u of hospital admission for autoimmune encephalitis. The episodes that occurred 10/16/2023 continued; she presented to [place] twice. Workup was unremarkable. The migraine cocktail helped. She was also admitted to [facility] with severe headaches. She was readmitted a few days later with chest pain, from the Sumatriptan. She continues to experience recurrent episodes of paralysis; can be on either side and can be on both sides. The episodes can range from 10 minutes to 2.5 hours; sometimes it just affects her hand/arm and other times it can be the whole side of her body. When it comes on it's a tingling sensation that she can feel spreading; w/ in minutes feels the full paralysis. She has at least one episode a day; most frequently at night time. She also occasionally feels weakness on one side of the body. During one episode, she endorsed shaking/convulsions.  ----on 10/16/23: she was feeling unwell, run down and off balance. She had an episode where she felt her ears were popping, dizzy, and lightheaded. Her eyes were sensitive to light. It came on rapidly; she then developed paralysis from her right hand up to arm up to face, and slurred speech, and then she couldn't formulate thoughts. She was admitted to [facility] and was told she had viral meningitis. She was treated w antiviral and sent home.  ----previous EEG: The findings may be suggestive of focal cerebral dysfunction. Clinical correlation and correlation with neuroimaging is recommended. No epileptiform activity was seen and no clinical events or seizures were  recorded. Consider a repeat study if clinically indicated.

## 2024-09-28 NOTE — PHYSICAL THERAPY INITIAL EVALUATION ADULT - ADDITIONAL COMMENTS
Pt resides in a private house with [facility]. Pt has 6 stairs to enter and 1 flight inside. Pt independent prior to admission.

## 2024-09-28 NOTE — H&P ADULT - NSHPREVIEWOFSYSTEMS_GEN_ALL_CORE
ROS:  Constitutional: No fever, weight loss or fatigue  Eyes: No eye pain, visual disturbances  ENMT:  No difficulty hearing, tinnitus, vertigo; No sinus or throat pain; severe ear popping sensation as described above  Neck: No pain or stiffness  Respiratory: No cough, wheezing, chills or hemoptysis  Cardiovascular: No chest pain, palpitations, shortness of breath, dizziness  Gastrointestinal: No abdominal pain. No nausea, vomiting or hematemesis; No diarrhea or constipation. Nohematochezia.  Neurological: As per HPI  Musculoskeletal: No joint pain or swelling; No muscle, back or extremity pain  Psychiatric: No depression, anxiety, mood swings or difficulty sleeping  Heme/Lymph: No easy bruising or bleeding gums

## 2024-09-28 NOTE — H&P ADULT - ASSESSMENT
Assessment: 38 yo R handed female w PMHx of hashimoto's and viral meningitis 2023 presenting as code stroke for R side weakness and numbness. Per patient and , patient woke up around 0000 because a painful ear popping sensation work her. She had gone to bed at 2330. After the popping sensation she experienced severe numbness of weakness start from her R face then go to her arm then to her leg. The patient and her  report that this episode is quite similar to her episodes of neurologic deficit when she was diagnosed with autoimmune encephalitis in 10/2023. The patient reported that previously she had had ear popping then weakness on the L that progress to involve the right and she had a previous episode of ear popping then R side weakness. The patient presentation was discussed with the stroke attending and the patient clinical presentation though localizable seems more consistent with her previous encephalitis sequelae so tenecteplase was NOT given.  Initial VS in ED: 118/78  Exam: R side weakness WO facial weakness and R side numbness W whole face involvement    mRS: 0-1 (patient has continued to have recurring episodes of sequelae to autoimmune encephalitis)  LKN: 0000 09/28/2024  NIHSS: 6 (RUE and RLE drift, extinction, sense decrease)    NOT a tenecteplase candidate due to The patient presentation was discussed with the stroke attending and the patient clinical presentation though localizable seems more consistent with her previous encephalitis sequelae so tenecteplase was NOT given.  NOT a mechanical thrombectomy candidate due to no LVO    Impression:  R side weakness WO facial weakness and R side numbness W whole face involvement. Localization to L brain dysfunction. Etiology unclear given sudden onset of symptoms increasing concern for ischemia vs similarity to previous episodes that were linked to suspected autoimmune encephalitis. Differentials include migraine w neurologic sequelae vs epileptiform pathology.    Recommendations:    Diagnostics:  [] MRI brain without contrast   [] CBC, CMP, coagulation panel, troponin    Medications:  [] Discuss IVIG and/or steroids w day attending  [] start aspirin 81 mg po qd now  [] Atorvastatin 80mg (titrate to LDL < 70)   [] Lovenox SQ for DVT prophylaxis per primary    Miscellaneous:  [] NPO unless passes dysphagia screen; swallow eval if fails  [] Keep BP permissive up to 220/120 for 48 hours followed by gradual normotension over 2-3 days   [] Telemonitoring  [] Neurological checks and vital signs q1h  [] BGM goals 140-180  [] PT/OT; S/S evaluation    * Case staffed overnight w Dr. Hoover and Dr. Lan. Case and plan not final until Attending attestation *

## 2024-09-28 NOTE — PHYSICAL THERAPY INITIAL EVALUATION ADULT - MD/RN NOTIFIED
Pt called stating she has a growth in her nose that keeps getting bigger and it is effecting her breathing pt did get scheduled for February 21 please advise if she can be seen sooner.    yes

## 2024-09-28 NOTE — OCCUPATIONAL THERAPY INITIAL EVALUATION ADULT - DIAGNOSIS, OT EVAL
R sided weakness, R sided sensory impairments, impacting ability to perform ADL/functional mobility at baseline.

## 2024-09-28 NOTE — STROKE CODE NOTE - NSSTROKETPAEXCLREL_OTHER_FT
it was discussed w Dr. Lan that patient presentation consistent w her previous meningitis/encephalitis picture so risks of TNK outweight benefits as clinical picture does not seem more consistent w a stroke

## 2024-09-28 NOTE — ED ADULT NURSE NOTE - OBJECTIVE STATEMENT
(Break coverage RN covering for primary RN) Pt is a 39y female who presents to [facility] ED from triage as a CODE STROKE, initiated @0144, pt immediately wheel chaired from the WR to CT scan with ED RN, ED MD and Neuro MD. Pt endorses she went to sleep @10pm woke up @0000 and around 0100 she felt a sudden pop in her R ear and a painful numbing sensation went down her R side of the face, to her R jaw, down her R shoulder and to her RUE+RLE, pt walked to the restroom after this incident and felt very dizzy and weak, stating her limbs felt very heavy and she felt off balance so she had her fiance take her to the ER to be eval. Pt initial NIHSS score given by Neuro MD of 6. Currently pt has decreased sensation and numbness of RLE+RUE with drift present, dizziness, R shoulder pain.PMH Hashimoto's (on Levothyroxine), last year dx. viral meningitis. Pt is A&Ox4, speaking in full coherent sentences, breathing is spontaneous and unlabored, currently denying any HA, visual deficits, SOB, CP, palpitations, abd pain, urinary symptoms, n/v/d/c, fever. Denies recent travel/sick contacts. Pt ambulates independently at baseline. Peripheral IV placed by ED RN, 20G in L AC. FS performed, .  Denies blood thinner use. (Break coverage RN covering for primary RN) Pt is a 39y female who presents to [facility] ED from triage as a CODE STROKE, initiated @0144, pt immediately wheel chaired from the WR to CT scan with ED RN, ED MD Herrera and Neuro MD Lewis. Pt endorses she went to sleep @10pm woke up @0000 and around 0100 she felt a sudden pop in her R ear and a painful numbing sensation went down her R side of the face, to her R jaw, down her R shoulder and to her RUE+RLE, pt walked to the restroom after this incident and felt very dizzy and weak, stating her limbs felt very heavy and she felt off balance so she had her fiance take her to the ER to be eval. Pt initial NIHSS score given by Neuro MD Lewis of 6. Currently pt has decreased sensation and numbness of RLE+RUE with drift present, dizziness, R shoulder pain.PMH Hashimoto's (on Levothyroxine), last year dx. viral meningitis. Pt is A&Ox4, speaking in full coherent sentences, breathing is spontaneous and unlabored, currently denying any HA, visual deficits, SOB, CP, palpitations, abd pain, urinary symptoms, n/v/d/c, fever. Denies recent travel/sick contacts. Pt ambulates independently at baseline. Peripheral IV placed by ED RN, 20G in L AC. FS performed, .  Denies blood thinner use. (Break coverage RN covering for primary RN) Pt is a 39y female who presents to [facility] ED from triage as a CODE STROKE, initiated @0144, pt immediately wheel chaired from the WR to CT scan with ED RN, ED MD Herrera and Neuro MD Lewis. Pt endorses she went to sleep @10pm woke up @0000 and around 0100 she felt a sudden pop in her R ear and a painful numbing sensation went down her R side of the face, to her R jaw, down her R shoulder and to her RUE+RLE, pt walked to the restroom after this incident and felt very dizzy and weak, stating her limbs felt very heavy and she felt off balance so she had her fiance take her to the ER to be eval. Pt initial NIHSS score given by Neuro MD Lewis of 6. Currently pt has decreased sensation and numbness of RLE+RUE with drift present, dizziness, R shoulder pain.PMH Hashimoto's (on Levothyroxine), last year dx. viral meningitis. Pt endorses since viral meninigitis diagnosis she follows up with neurologist. Pt is A&Ox4, speaking in full coherent sentences, breathing is spontaneous and unlabored, currently denying any HA, visual deficits, SOB, CP, palpitations, abd pain, urinary symptoms, n/v/d/c, fever. Denies recent travel/sick contacts. Pt ambulates independently at baseline. Peripheral IV placed by ED RN, 20G in L AC. FS performed, .  Denies blood thinner use. (Break coverage RN covering for primary RN) Pt is a 39y female who presents to [facility] ED from triage as a CODE STROKE, initiated @0144, pt immediately wheel chaired from the WR to CT scan with ED RN, ED MD Herrera and Neuro MD Lewis. Pt endorses she went to sleep @10pm woke up @0000 and around 0100 she felt a sudden pop in her R ear and a painful numbing sensation went down her R side of the face, to her R jaw, down her R shoulder and to her RUE+RLE, pt walked to the restroom after this incident and felt very dizzy and weak, stating her limbs felt very heavy and she felt off balance so she had her fiance take her to the ER to be eval. Pt initial NIHSS score given by Neuro MD Lewis of 6. Currently pt has decreased sensation and numbness of RLE+RUE with drift present, dizziness, R shoulder pain.PMH Hashimoto's (on Levothyroxine), last year dx. viral meningitis. Pt endorses since viral meninigitis diagnosis she follows up with neurologist due to b/l weakness and decreased sensation deficits. Pt is A&Ox4, speaking in full coherent sentences, breathing is spontaneous and unlabored, currently denying any HA, visual deficits, SOB, CP, palpitations, abd pain, urinary symptoms, n/v/d/c, fever. Denies recent travel/sick contacts. Pt ambulates independently at baseline. Peripheral IV placed by ED RN, 20G in L AC. FS performed, .  Denies blood thinner use. (Break coverage RN covering for primary RN) Pt is a 39y female who presents to [facility] ED from triage as a CODE STROKE, initiated @0144, pt immediately wheel chaired from the WR to CT scan with ED RN, ED MD Herrera and Neuro MD Lewis. Pt endorses she went to sleep @1130pm woke up @0000 and around 0100 she felt a sudden pop in her R ear and a painful numbing sensation went down her R side of the face, to her R jaw, down her R shoulder and to her RUE+RLE, pt walked to the restroom after this incident and felt very dizzy and weak, stating her limbs felt very heavy and she felt off balance so she had her fiance take her to the ER to be eval. Pt initial NIHSS score given by Neuro MD Lewis of 6. Currently pt has decreased sensation and numbness of RLE+RUE with drift present, dizziness, R shoulder pain.PMH Hashimoto's (on Levothyroxine), last year dx. viral meningitis. Pt endorses since viral meninigitis diagnosis she follows up with neurologist due to b/l weakness and decreased sensation deficits. Pt is A&Ox4, speaking in full coherent sentences, breathing is spontaneous and unlabored, currently denying any HA, visual deficits, SOB, CP, palpitations, abd pain, urinary symptoms, n/v/d/c, fever. Denies recent travel/sick contacts. Pt ambulates independently at baseline. Peripheral IV placed by ED RN, 20G in L AC. FS performed, .  Denies blood thinner use.

## 2024-09-28 NOTE — OCCUPATIONAL THERAPY INITIAL EVALUATION ADULT - ADDITIONAL COMMENTS
Pt lives in  with fiance, 6 steps to enter and +1 flight to 2nd level bedroom, bathroom. As per patient, independent with ADLs prior to admission, ambulated independently with no assistive devices.

## 2024-09-28 NOTE — OCCUPATIONAL THERAPY INITIAL EVALUATION ADULT - LLE MMT, REHAB EVAL
CC: f/u COPD exac, lung nodules ?metastatic dz  INTERVAL HPI/OVERNIGHT EVENTS: Pt seen and examined at bedside this AM by Hospitalist and PA. This AM felt short of breath and became tachycardic after using Spiriva inhaler for the first time. Pt O2 sat decreased to 88% during this time and placed on 4L NC and O2 sat increased to 97%. Pt states that she had never taken Spiriva before but has no other reactions to albuterol. Placed on . Pt reevaluated about 20 minutes after this episode and Pt feels back to baseline, no chest pain, shortness of breath, palpitations, difficulty breathing, cough, n/v or any other complaints.     REVIEW OF SYSTEMS:  CONSTITUTIONAL: No fever, weight loss, or fatigue  EYES: No eye pain, visual disturbances, or discharge  ENT:  No difficulty hearing, tinnitus, vertigo; No sinus or throat pain  NECK: No pain or stiffness  RESPIRATORY: see HPI  CARDIOVASCULAR: No chest pain, palpitations, dizziness, or leg swelling  GASTROINTESTINAL: No abdominal or epigastric pain. No nausea, vomiting, or hematemesis; No diarrhea or constipation.  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    PAST MEDICAL & SURGICAL HISTORY:  Anemia  COPD (chronic obstructive pulmonary disease)  H/O bilateral hip replacements    VITAL SIGNS:  T(C): 36.5 (12-09-19 @ 07:50), Max: 36.5 (12-08-19 @ 15:50)  HR: 77 (12-09-19 @ 08:09) (77 - 96)  BP: 115/69 (12-09-19 @ 07:50) (102/70 - 115/69)  RR: 18 (12-09-19 @ 07:50) (17 - 19)  SpO2: 95% (12-09-19 @ 08:09) (95% - 97%)  Wt(kg): --Vital Signs Last 24 Hrs  T(C): 36.5 (09 Dec 2019 07:50), Max: 36.5 (08 Dec 2019 15:50)  T(F): 97.7 (09 Dec 2019 07:50), Max: 97.7 (08 Dec 2019 15:50)  HR: 77 (09 Dec 2019 08:09) (77 - 96)  BP: 115/69 (09 Dec 2019 07:50) (102/70 - 115/69)  BP(mean): --  RR: 18 (09 Dec 2019 07:50) (17 - 19)  SpO2: 95% (09 Dec 2019 08:09) (95% - 97%)    PHYSICAL EXAM:  GENERAL: Frail woman, Pt lying in bed comfortably in NAD, speaking in full sentences   HEAD:  Atraumatic, Normocephalic  EYES: EOMI, PERRL, conjunctiva and sclera clear  ENT: Moist mucous membranes  NECK: Supple, No JVD  CHEST/LUNG: Decreased breath sounds B/L, good air movement. Clear to auscultation bilaterally; No rales, rhonchi or wheezing. Unlabored respirations  HEART: Regular rate and rhythm; No murmurs, rubs, or gallops  ABDOMEN: Bowel sounds present; Soft, Nontender, Nondistended. No guarding or rigidity   EXTREMITIES:  2+ Peripheral Pulses, brisk capillary refill. No clubbing, cyanosis, or edema  NERVOUS SYSTEM:  Alert & Oriented X3, speech clear, FROM x 4 extremities. No deficits   SKIN: No rashes or lesions    LABS:                        9.4    8.95  )-----------( 402      ( 08 Dec 2019 06:56 )             29.8     12-08    139  |  96<L>  |  14.0  ----------------------------<  90  4.3   |  30.0<H>  |  0.64    Ca    8.8      08 Dec 2019 06:56 5/5

## 2024-09-28 NOTE — PHYSICAL THERAPY INITIAL EVALUATION ADULT - PERTINENT HX OF CURRENT PROBLEM, REHAB EVAL
Pt is a 40 yo R handed female w PMHx of hashimoto's and viral meningitis 2023 presenting as code stroke for R side weakness and numbness. Per patient and , patient woke up around 0000 because a painful ear popping sensation work her. She had gone to bed at 2330. After the popping sensation she experienced severe numbness of weakness start from her R face then go to her arm then to her leg. The patient and her  report that this episode is quite similar to her episodes of neurologic deficit when she was diagnosed with autoimmune encephalitis in 10/2023. The patient reported that previously she had had ear popping then weakness on the L that progress to involve the right and she had a previous episode of ear popping then R side weakness. The patient presentation was discussed with the stroke attending and the patient clinical presentation though localizable seems more consistent with her previous encephalitis sequelae so tenecteplase was NOT given.  CT Brain: No acute intracranial hemorrhage, mass effect, or midline shift.  CT PERFUSION: Technical limitations: None.  Core infarction: 0 ml Penumbra / tissue at risk for active ischemia: 0 ml  CTA NECK: No significant stenosis or occlusion.  CTA HEAD: No large vessel occlusion, significant stenosis or vascular abnormality identified.

## 2024-09-28 NOTE — CONSULT NOTE ADULT - SUBJECTIVE AND OBJECTIVE BOX
**STROKE CODE CONSULT NOTE**    Last known well time/Time of onset of symptoms:  0100 09/28/2024    HPI:  38 yo R handed female w PMHx of hashimoto's and viral meningitis 2023 presenting as code stroke for R side weakness and numbness. Per patient and , patient woke up around 0000 then at 0100 she felt a popping sensation in the R ear followed by numbness and weakness in the entire R face, R arm, and R leg. She came to the ED right away. Per patient, she had BL deficits from her meningitis but this weakness is new.    PAST MEDICAL & SURGICAL HISTORY:  Hemochromatosis  H/O Hashimoto thyroiditis  Hypothyroidism  Viral meningitis      FAMILY HISTORY:      ROS:  Constitutional: No fever, weight loss or fatigue  Eyes: No eye pain, visual disturbances, or discharge  ENMT:  No difficulty hearing, tinnitus, vertigo; No sinus or throat pain  Neck: No pain or stiffness  Respiratory: No cough, wheezing, chills or hemoptysis  Cardiovascular: No chest pain, palpitations, shortness of breath, dizziness or leg swelling  Gastrointestinal: No abdominal pain. No nausea, vomiting or hematemesis; No diarrhea or constipation. Nohematochezia.  Genitourinary: No dysuria, frequency, hematuria or incontinence  Neurological: As per HPI  Skin: No itching, burning, rashes or lesions   Endocrine: No heat or cold intolerance; No hair loss  Musculoskeletal: No joint pain or swelling; No muscle, back or extremity pain  Psychiatric: No depression, anxiety, mood swings or difficulty sleeping  Heme/Lymph: No easy bruising or bleeding gums    MEDICATIONS  (STANDING):    MEDICATIONS  (PRN):      Allergies    penicillin (Short breath)    Intolerances    sumatriptan (Other (Mild))      Vital Signs Last 24 Hrs  T(C): 36.7 (28 Sep 2024 01:40), Max: 36.7 (28 Sep 2024 01:40)  T(F): 98 (28 Sep 2024 01:40), Max: 98 (28 Sep 2024 01:40)  HR: 82 (28 Sep 2024 01:40) (82 - 82)  BP: 118/78 (28 Sep 2024 01:40) (118/78 - 118/78)  BP(mean): --  RR: 16 (28 Sep 2024 01:40) (16 - 16)  SpO2: 98% (28 Sep 2024 01:40) (98% - 98%)    Parameters below as of 28 Sep 2024 01:40  Patient On (Oxygen Delivery Method): room air        PHYSICAL EXAM:  Constitutional: WDWN; NAD    Neurologic:  Mental status: Awake, alert and oriented x3.  Recent and remote memory intact.  Naming, repetition and comprehension intact.  Attention/concentration intact. Speech fluent and no phoneme errors.  Fund of knowledge appropriate.    Cranial nerves: pupils equally round and reactive to light, visual fields full, no nystagmus, extraocular muscles intact, decreased sensation to light in R face in V1-V3 distribution, face symmetric, hearing intact to finger rub, palate elevation symmetric, tongue was midline, sternocleidomastoid/shoulder shrug strength bilaterally 5/5.    Motor:  Normal bulk and tone, strength 5/5 in bilateral upper and lower extremities.   strength 5/5.  Rapid alternating movements intact and symmetric.   Sensation: Intact to light touch, proprioception, and pinprick.  No neglect.   Coordination: No dysmetria on finger-to-nose and heel-to-shin.  No clumsiness.  Reflexes: 2+ in upper and lower extremities, downgoing toes bilaterally  Gait: Narrow and steady. No ataxia.  Romberg negative    NIHSS:    Fingerstick Blood Glucose: CAPILLARY BLOOD GLUCOSE      POCT Blood Glucose.: 124 mg/dL (28 Sep 2024 01:43)       LABS:                        13.6   7.76  )-----------( 304      ( 28 Sep 2024 01:55 )             40.5                     RADIOLOGY & ADDITIONAL STUDIES:    IV-tenecteplase(Y/N):                                   Bolus time:  Reason IV-tenecteplase not given:   **STROKE CODE CONSULT NOTE**    Last known well time/Time of onset of symptoms:  0000 09/28/2024    HPI:  40 yo R handed female w PMHx of hashimoto's and viral meningitis 2023 presenting as code stroke for R side weakness and numbness. Per patient and , patient woke up around 0000 because a painful ear popping sensation work her. She had gone to bed at 2330. After the popping sensation she experienced severe numbness of weakness start from her R face then go to her arm then to her leg. The patient and her  report that this episode is quite similar to her episodes of neurologic deficit when she was diagnosed with autoimmune encephalitis in 10/2023. The patient reported that previously she had had ear popping then weakness on the L that progress to involve the right and she had a previous episode of ear popping then R side weakness. The patient presentation was discussed with the stroke attending and the patient clinical presentation though localizable seems more consistent with her previous encephalitis sequelae so tenecteplase was NOT given.    On chart review: information taken from Dr. Lincoln note 04/26/2024  ----on 1/29/2024: patient noted to have been admitted then readmitted in the hospital for (+) covid. Work up revealed wbc 11 and mildly elevated protein. She received IVIG and solumedrol. It was suspected at that time that her Hashimoto's has played a predisposing factor in her autoimmune encephalitis  ----on 11/28/2023: Patient evaluated for HA during f/u of hospital admission for autoimmune encephalitis. The episodes that occurred 10/16/2023 continued; she presented to [facility] twice. Workup was unremarkable. The migraine cocktail helped. She was also admitted to [facility] with severe headaches. She was readmitted a few days later with chest pain, from the Sumatriptan. She continues to experience recurrent episodes of paralysis; can be on either side and can be on both sides. The episodes can range from 10 minutes to 2.5 hours; sometimes it just affects her hand/arm and other times it can be the whole side of her body. When it comes on it's a tingling sensation that she can feel spreading; w/ in minutes feels the full paralysis. She has at least one episode a day; most frequently at night time. She also occasionally feels weakness on one side of the body. During one episode, she endorsed shaking/convulsions.  ----on 10/16/23: she was feeling unwell, run down and off balance. She had an episode where she felt her ears were popping, dizzy, and lightheaded. Her eyes were sensitive to light. It came on rapidly; she then developed paralysis from her right hand up to arm up to face, and slurred speech, and then she couldn't formulate thoughts. She was admitted to [facility] and was told she had viral meningitis. She was treated w antiviral and sent home.  ----previous EEG: The findings may be suggestive of focal cerebral dysfunction. Clinical correlation and correlation with neuroimaging is recommended. No epileptiform activity was seen and no clinical events or seizures were  recorded. Consider a repeat study if clinically indicated.        PAST MEDICAL & SURGICAL HISTORY:  Hemochromatosis  H/O Hashimoto thyroiditis  Hypothyroidism  autoimmune encephalitis      FAMILY HISTORY:      ROS:  Constitutional: No fever, weight loss or fatigue  Eyes: No eye pain, visual disturbances  ENMT:  No difficulty hearing, tinnitus, vertigo; No sinus or throat pain; severe ear popping sensation as described above  Neck: No pain or stiffness  Respiratory: No cough, wheezing, chills or hemoptysis  Cardiovascular: No chest pain, palpitations, shortness of breath, dizziness  Gastrointestinal: No abdominal pain. No nausea, vomiting or hematemesis; No diarrhea or constipation. Nohematochezia.  Neurological: As per HPI  Musculoskeletal: No joint pain or swelling; No muscle, back or extremity pain  Psychiatric: No depression, anxiety, mood swings or difficulty sleeping  Heme/Lymph: No easy bruising or bleeding gums    MEDICATIONS  (STANDING):    MEDICATIONS  (PRN):      Allergies    penicillin (Short breath)    Intolerances    sumatriptan (Other (Mild))      Vital Signs Last 24 Hrs  T(C): 36.7 (28 Sep 2024 01:40), Max: 36.7 (28 Sep 2024 01:40)  T(F): 98 (28 Sep 2024 01:40), Max: 98 (28 Sep 2024 01:40)  HR: 82 (28 Sep 2024 01:40) (82 - 82)  BP: 118/78 (28 Sep 2024 01:40) (118/78 - 118/78)  BP(mean): --  RR: 16 (28 Sep 2024 01:40) (16 - 16)  SpO2: 98% (28 Sep 2024 01:40) (98% - 98%)    Parameters below as of 28 Sep 2024 01:40  Patient On (Oxygen Delivery Method): room air        PHYSICAL EXAM:  Constitutional: WDWN; NAD    Neurologic:  Mental status: Awake, alert and oriented x3.  Recent and remote memory intact.  Naming, repetition and comprehension intact.  Attention/concentration intact. Speech fluent and no phoneme errors.  Fund of knowledge appropriate.    Cranial nerves: pupils equally round and reactive to light, visual fields full, no nystagmus, extraocular muscles intact, decreased sensation to light in R face in V1-V3 distribution, face symmetric, hearing intact to finger rub, palate elevation symmetric, tongue was midline, shoulder shrug strength 3+/5 on R and 5/5 on L.    Motor:  Normal bulk and tone,   ---LUE 5/5 thru out  ---LLE 5/5 thru out  ---RUE deltoid 3-/5; bicep 4/5; tricep 4-/5; wrist flex 3+/5; wrist extend 3+/5; IO 3/5;  4/5  ---RLE hip flex and extend 3-/5; knee flex and extend 3/5; plantar flex 4-/5; dorsiflex 4-/5  Sensation: severely diminished sensation to light touch and pinprick on R side w extinction to light touch on R when BL light touch sensation presented  Coordination: No dysmetria on finger-to-nose and heel-to-shin.  No clumsiness.  Reflexes: 2+ in upper and lower extremities, downgoing toes bilaterally  Gait: slow with small steps    NIHSS: 6 (RUE and RLE drift, extinction, sense decrease)    Fingerstick Blood Glucose: CAPILLARY BLOOD GLUCOSE      POCT Blood Glucose.: 124 mg/dL (28 Sep 2024 01:43)       LABS:                        13.6   7.76  )-----------( 304      ( 28 Sep 2024 01:55 )             40.5         RADIOLOGY & ADDITIONAL STUDIES:    IV-tenecteplase(Y/N):                N  Reason IV-tenecteplase not given:  The patient presentation was discussed with the stroke attending and the patient clinical presentation though localizable seems more consistent with her previous encephalitis sequelae so tenecteplase was NOT given.

## 2024-09-28 NOTE — ED PROVIDER NOTE - PROGRESS NOTE DETAILS
Attending Radha:  neuro at bs, he spoke w/ attending while I was in room, currently neuro atttending and myself feel r outweigh b of tnk currently given presenatations similar to this multiple x before. Attending Radha:  in further review of pt's prior documentation she has had this presentation several times very similar including "poppins sensation in head", has had r sided weakness and b/l weakness that varies, pt and notes cooberate that this is very similar to previous, no recent seizure like activity, given recent neg workup with similar symptoms in past leading to steroid ivig trx, this is why tnk was withheld as felt r outweighed b as seems possibly not a stroke but other potnential disorder (did resolved in the past) Attending Radha:  neur requests admit to emu

## 2024-09-28 NOTE — ED PROVIDER NOTE - OBJECTIVE STATEMENT
Attending Masom:  38 y/o f presenting /w r sided weakness, lkn 23:30, 9/28/24, symptoms started at 12. no f/c ha/ n/v, no cp, sob, no head trauma.

## 2024-09-28 NOTE — OCCUPATIONAL THERAPY INITIAL EVALUATION ADULT - PERTINENT HX OF CURRENT PROBLEM, REHAB EVAL
38 yo R handed female w PMHx of hashimoto's and viral meningitis 2023 presenting as code stroke for R side weakness and numbness. Per patient and , patient woke up around 0000 because a painful ear popping sensation work her. She had gone to bed at 2330. After the popping sensation she experienced severe numbness of weakness start from her R face then go to her arm then to her leg. The patient and her  report that this episode is quite similar to her episodes of neurologic deficit when she was diagnosed with autoimmune encephalitis in 10/2023. The patient reported that previously she had had ear popping then weakness on the L that progress to involve the right and she had a previous episode of ear popping then R side weakness. The patient presentation was discussed with the stroke attending and the patient clinical presentation though localizable seems more consistent with her previous encephalitis sequelae so tenecteplase was NOT given. CT Brain: (9/28) No acute intracranial hemorrhage, mass effect, or midline shift. MRI Head: (9/28) IMPRESSION: NO EVIDENCE OF INTRACRANIAL HEMORRHAGE, ACUTE TERRITORIAL INFARCT OR AREA OF ABNORMAL ENHANCEMENT.

## 2024-09-28 NOTE — PHYSICAL THERAPY INITIAL EVALUATION ADULT - GENERAL OBSERVATIONS, REHAB EVAL
Pt received supine in bed, A&0x4, +tele, +pulse ox, following 100% multi-step commands, fiance at bedside.

## 2024-09-28 NOTE — ED ADULT TRIAGE NOTE - CHIEF COMPLAINT QUOTE
Pt felt "pop" in her ear x40 minutes ago woke her up from sleep. Now having some loss of balance, dizziness, and R-sided numbness

## 2024-09-28 NOTE — H&P ADULT - NSHPPHYSICALEXAM_GEN_ALL_CORE
PHYSICAL EXAM:  Constitutional: WDWN; NAD    Neurologic:  Mental status: Awake, alert and oriented x3.  Recent and remote memory intact.  Naming, repetition and comprehension intact.  Attention/concentration intact. Speech fluent and no phoneme errors.  Fund of knowledge appropriate.    Cranial nerves: pupils equally round and reactive to light, visual fields full, no nystagmus, extraocular muscles intact, decreased sensation to light in R face in V1-V3 distribution, face symmetric, hearing intact to finger rub, palate elevation symmetric, tongue was midline, shoulder shrug strength 3+/5 on R and 5/5 on L.    Motor:  Normal bulk and tone,   ---LUE 5/5 thru out  ---LLE 5/5 thru out  ---RUE deltoid 3-/5; bicep 4/5; tricep 4-/5; wrist flex 3+/5; wrist extend 3+/5; IO 3/5;  4/5  ---RLE hip flex and extend 3-/5; knee flex and extend 3/5; plantar flex 4-/5; dorsiflex 4-/5  Sensation: severely diminished sensation to light touch and pinprick on R side w extinction to light touch on R when BL light touch sensation presented  Coordination: No dysmetria on finger-to-nose and heel-to-shin.  No clumsiness.  Reflexes: 2+ in upper and lower extremities, downgoing toes bilaterally  Gait: slow with small steps    NIHSS: 6 (RUE and RLE drift, extinction, sense decrease)

## 2024-09-28 NOTE — OCCUPATIONAL THERAPY INITIAL EVALUATION ADULT - GENERAL OBSERVATIONS, REHAB EVAL
Upon entry, patient semi-supine in bed in ED, +pulse ox +tele +IVL, family member present at bedside, patient agreeable to OT eval, cleared for OT evaluation as per RANDY Chandler

## 2024-09-28 NOTE — ED ADULT NURSE REASSESSMENT NOTE - NS ED NURSE REASSESS COMMENT FT1
Pt possible stroke showing signs and Syptoms neuro team at bedside discussing risks and benefits of TNK. TNK on hold at this time.

## 2024-09-28 NOTE — CONSULT NOTE ADULT - ASSESSMENT
Assessment: 40 yo R handed female w PMHx of hashimoto's and viral meningitis 2023 presenting as code stroke for R side weakness and numbness. Per patient and , patient woke up around 0000 because a painful ear popping sensation work her. She had gone to bed at 2330. After the popping sensation she experienced severe numbness of weakness start from her R face then go to her arm then to her leg. The patient and her  report that this episode is quite similar to her episodes of neurologic deficit when she was diagnosed with autoimmune encephalitis in 10/2023. The patient reported that previously she had had ear popping then weakness on the L that progress to involve the right and she had a previous episode of ear popping then R side weakness. The patient presentation was discussed with the stroke attending and the patient clinical presentation though localizable seems more consistent with her previous encephalitis sequelae so tenecteplase was NOT given.  Initial VS in ED: 118/78  Exam: R side weakness WO facial weakness and R side numbness W whole face involvement    mRS: 0-1 (patient has continued to have recurring episodes of sequelae to autoimmune encephalitis)  LKN: 0000 09/28/2024  NIHSS: 6 (RUE and RLE drift, extinction, sense decrease)    NOT a tenecteplase candidate due to The patient presentation was discussed with the stroke attending and the patient clinical presentation though localizable seems more consistent with her previous encephalitis sequelae so tenecteplase was NOT given.  NOT a mechanical thrombectomy candidate due to no LVO    Impression:  R side weakness WO facial weakness and R side numbness W whole face involvement. Localization to L brain dysfunction. Etiology unclear given sudden onset of symptoms increasing concern for ischemia vs similarity to previous episodes that were linked to suspected autoimmune encephalitis.    Recommendations:    Diagnostics:  [] MRI brain without contrast   [] CBC, CMP, coagulation panel, troponin    Medications:  [] Discuss IVIG and/or steroids w day attending  [] start aspirin 81 mg po qd now  [] Atorvastatin 80mg (titrate to LDL < 70)   [] Lovenox SQ for DVT prophylaxis per primary    Miscellaneous:  [] NPO unless passes dysphagia screen; swallow eval if fails  [] Keep BP permissive up to 220/120 for 48 hours followed by gradual normotension over 2-3 days   [] Telemonitoring  [] Neurological checks and vital signs q1h  [] BGM goals 140-180  [] PT/OT; S/S evaluation    * Case and plan not final until Attending attestation *

## 2024-09-28 NOTE — H&P ADULT - NSHPLABSRESULTS_GEN_ALL_CORE
< from: CT Brain Perfusion Maps Stroke (09.28.24 @ 02:08) >    IMPRESSION:    CT PERFUSION:  Technical limitations: None.    Core infarction: 0 ml  Penumbra / tissue at risk for active ischemia: 0 ml    CTA NECK:  No significant stenosis or occlusion.    CTA HEAD:  No large vessel occlusion, significant stenosis or vascular abnormality   identified.    < end of copied text >    < from: CT Brain Stroke Protocol (09.28.24 @ 02:07) >      IMPRESSION:  No acute intracranial hemorrhage, mass effect, or midline shift.    < end of copied text >

## 2024-09-28 NOTE — PHYSICAL THERAPY INITIAL EVALUATION ADULT - ACTIVE RANGE OF MOTION EXAMINATION, REHAB EVAL
Left UE Active ROM was WNL (within normal limits)/LLE Active ROM was WNL (within normal limits)/Right UE Active ROM was WFL (within functional limits)/Right LE Active ROM was WFL (within functional limits)

## 2024-09-29 VITALS
DIASTOLIC BLOOD PRESSURE: 69 MMHG | HEART RATE: 71 BPM | RESPIRATION RATE: 17 BRPM | TEMPERATURE: 98 F | SYSTOLIC BLOOD PRESSURE: 98 MMHG | OXYGEN SATURATION: 97 %

## 2024-09-29 PROCEDURE — 82962 GLUCOSE BLOOD TEST: CPT

## 2024-09-29 PROCEDURE — 82947 ASSAY GLUCOSE BLOOD QUANT: CPT

## 2024-09-29 PROCEDURE — 70498 CT ANGIOGRAPHY NECK: CPT | Mod: MC

## 2024-09-29 PROCEDURE — 80061 LIPID PANEL: CPT

## 2024-09-29 PROCEDURE — 85025 COMPLETE CBC W/AUTO DIFF WBC: CPT

## 2024-09-29 PROCEDURE — 95716 VEEG EA 12-26HR CONT MNTR: CPT

## 2024-09-29 PROCEDURE — 97161 PT EVAL LOW COMPLEX 20 MIN: CPT

## 2024-09-29 PROCEDURE — 80053 COMPREHEN METABOLIC PANEL: CPT

## 2024-09-29 PROCEDURE — 84132 ASSAY OF SERUM POTASSIUM: CPT

## 2024-09-29 PROCEDURE — 84702 CHORIONIC GONADOTROPIN TEST: CPT

## 2024-09-29 PROCEDURE — 0042T: CPT | Mod: MC

## 2024-09-29 PROCEDURE — 85027 COMPLETE CBC AUTOMATED: CPT

## 2024-09-29 PROCEDURE — 70553 MRI BRAIN STEM W/O & W/DYE: CPT | Mod: MC

## 2024-09-29 PROCEDURE — 84443 ASSAY THYROID STIM HORMONE: CPT

## 2024-09-29 PROCEDURE — 85014 HEMATOCRIT: CPT

## 2024-09-29 PROCEDURE — 84146 ASSAY OF PROLACTIN: CPT

## 2024-09-29 PROCEDURE — 82330 ASSAY OF CALCIUM: CPT

## 2024-09-29 PROCEDURE — 84484 ASSAY OF TROPONIN QUANT: CPT

## 2024-09-29 PROCEDURE — 95700 EEG CONT REC W/VID EEG TECH: CPT

## 2024-09-29 PROCEDURE — A9585: CPT

## 2024-09-29 PROCEDURE — 83605 ASSAY OF LACTIC ACID: CPT

## 2024-09-29 PROCEDURE — 84295 ASSAY OF SERUM SODIUM: CPT

## 2024-09-29 PROCEDURE — 99291 CRITICAL CARE FIRST HOUR: CPT

## 2024-09-29 PROCEDURE — 85610 PROTHROMBIN TIME: CPT

## 2024-09-29 PROCEDURE — 97166 OT EVAL MOD COMPLEX 45 MIN: CPT

## 2024-09-29 PROCEDURE — 70450 CT HEAD/BRAIN W/O DYE: CPT | Mod: MC

## 2024-09-29 PROCEDURE — 81001 URINALYSIS AUTO W/SCOPE: CPT

## 2024-09-29 PROCEDURE — 85018 HEMOGLOBIN: CPT

## 2024-09-29 PROCEDURE — 93005 ELECTROCARDIOGRAM TRACING: CPT

## 2024-09-29 PROCEDURE — 70496 CT ANGIOGRAPHY HEAD: CPT | Mod: MC

## 2024-09-29 PROCEDURE — 82435 ASSAY OF BLOOD CHLORIDE: CPT

## 2024-09-29 PROCEDURE — 82803 BLOOD GASES ANY COMBINATION: CPT

## 2024-09-29 PROCEDURE — 85730 THROMBOPLASTIN TIME PARTIAL: CPT

## 2024-09-29 RX ADMIN — ENOXAPARIN SODIUM 40 MILLIGRAM(S): 150 INJECTION SUBCUTANEOUS at 06:02

## 2024-09-29 RX ADMIN — Medication 20 MILLIGRAM(S): at 06:02

## 2024-09-29 NOTE — DISCHARGE NOTE PROVIDER - HOSPITAL COURSE
38 yo R handed female w PMHx of hashimoto's and viral meningitis 2023 presenting as code stroke for R side weakness and numbness. Per patient and , patient woke up around 0000 because a painful ear popping sensation work her. She had gone to bed at 2330. After the popping sensation she experienced severe numbness of weakness start from her R face then go to her arm then to her leg. The patient and her  report that this episode is quite similar to her episodes of neurologic deficit when she was diagnosed with autoimmune encephalitis in 10/2023. The patient reported that previously she had had ear popping then weakness on the L that progress to involve the right and she had a previous episode of ear popping then R side weakness. The patient presentation was discussed with the stroke attending and the patient clinical presentation though localizable seems more consistent with her previous encephalitis sequelae so tenecteplase was NOT given.    IV-tenecteplase(Y/N):                N  Reason IV-tenecteplase not given:  The patient presentation was discussed with the stroke attending and the patient clinical presentation though localizable seems more consistent with her previous encephalitis sequelae so tenecteplase was NOT given.      On chart review: information taken from Dr. Lincoln note 04/26/2024  ----on 1/29/2024: patient noted to have been admitted then readmitted in the hospital for (+) covid. Work up revealed wbc 11 and mildly elevated protein. She received IVIG and solumedrol. It was suspected at that time that her Hashimoto's has played a predisposing factor in her autoimmune encephalitis  ----on 11/28/2023: Patient evaluated for HA during f/u of hospital admission for autoimmune encephalitis. The episodes that occurred 10/16/2023 continued; she presented to [facility] twice. Workup was unremarkable. The migraine cocktail helped. She was also admitted to [facility] with severe headaches. She was readmitted a few days later with chest pain, from the Sumatriptan. She continues to experience recurrent episodes of paralysis; can be on either side and can be on both sides. The episodes can range from 10 minutes to 2.5 hours; sometimes it just affects her hand/arm and other times it can be the whole side of her body. When it comes on it's a tingling sensation that she can feel spreading; w/ in minutes feels the full paralysis. She has at least one episode a day; most frequently at night time. She also occasionally feels weakness on one side of the body. During one episode, she endorsed shaking/convulsions.  ----on 10/16/23: she was feeling unwell, run down and off balance. She had an episode where she felt her ears were popping, dizzy, and lightheaded. Her eyes were sensitive to light. It came on rapidly; she then developed paralysis from her right hand up to arm up to face, and slurred speech, and then she couldn't formulate thoughts. She was admitted to [facility] and was told she had viral meningitis. She was treated w antiviral and sent home.  ----previous EEG: The findings may be suggestive of focal cerebral dysfunction. Clinical correlation and correlation with neuroimaging is recommended. No epileptiform activity was seen and no clinical events or seizures were  recorded. Consider a repeat study if clinically indicated.    IMPRESSION:    CT PERFUSION:  Technical limitations: None.    Core infarction: 0 ml  Penumbra / tissue at risk for active ischemia: 0 ml    CTA NECK:  No significant stenosis or occlusion.    CTA HEAD:  No large vessel occlusion, significant stenosis or vascular abnormality   identified.    < end of copied text >    < from: CT Brain Stroke Protocol (09.28.24 @ 02:07) >      IMPRESSION:  No acute intracranial hemorrhage, mass effect, or midline shift.    < end of copied text >      ======  Patient underwent a repeat MRI Brain which showed no acute findings.  MR BRAIN WAW IC :  09/28/2024      INTERPRETATION:  CLINICAL INDICATION: R side weakness and numbness.    TECHNIQUE: Multi-planar, multi-sequence magnetic resonance imaging of the   brain was performed with and without intravenous contrast.    CONTRAST: Post-contrast MR images were obtained following infusion of 7.5   mL of Gadavist    COMPARISON: 09/28/2024    FINDINGS:    VENTRICLES AND SULCI:  Normal.  INTRA-AXIAL:  No acute intracranial hemorrhage, midline shift or evidence   of acute cerebral ischemia. No abnormal enhancement.  EXTRA-AXIAL:  No mass or collection.  VISUALIZED SINUSES: Mild mucosal thickening.  VISUALIZED MASTOIDS:  Clear.  CALVARIUM:  Normal.  CAROTID FLOW VOIDS: Normal.  MISCELLANEOUS:  None.    IMPRESSION: NO EVIDENCE OF INTRACRANIAL HEMORRHAGE, ACUTE TERRITORIAL   INFARCT OR AREA OF ABNORMAL ENHANCEMENT.  =======  Patient also improved after fluids and naproxen 250 mg bid. Patient is amenable to outpatient follow up with headache neurologist. Patient can follow up with general neurology at [address] [place] [address] 1-2 weeks after discharge. Please instruct the patient to call [phone] to schedule this appointment.

## 2024-09-29 NOTE — DISCHARGE NOTE NURSING/CASE MANAGEMENT/SOCIAL WORK - PATIENT PORTAL LINK FT
You can access the FollowMyHealth Patient Portal offered by [facility] by registering at the following website: [email or web address]. By joining [employer]’s FollowMyHealth portal, you will also be able to view your health information using other applications (apps) compatible with our system.

## 2024-09-29 NOTE — DISCHARGE NOTE PROVIDER - NSDCMRMEDTOKEN_GEN_ALL_CORE_FT
famotidine 20 mg oral tablet: 1 tab(s) orally 2 times a day take while using naproxen  Lexapro 5 mg oral tablet: 1 tab(s) orally once a day  naproxen 250 mg oral tablet: 1 tab(s) orally every 12 hours as needed for Moderate Pain (4 - 6)

## 2024-09-29 NOTE — DISCHARGE NOTE PROVIDER - CARE PROVIDERS DIRECT ADDRESSES
[email or web address]

## 2024-09-29 NOTE — DISCHARGE NOTE PROVIDER - NSDCFUSCHEDAPPT_GEN_ALL_CORE_FT
[facility]  NEUROLOGY [address] [place] [address]  Scheduled Appointment: 10/29/2024

## 2024-09-29 NOTE — EEG REPORT - NS EEG TEXT BOX
Patient Name: More Andrew    Age: 38 year, : 1985  MRN #: - MR# [id number]  [place]ters: - [address]  Referring Physician: -  MADINA admit            Study Started: 2024 10:28:47 AM  Study Ended: 2024 16:55:59   -------------------------------------------------------------------------------------------------------------------------------------------------------  STUDY INFORMATION:    EEG Recording Technique:  The patient underwent continuous Video-EEG monitoring, using Telemetry System hardware on the XLTek Digital System. EEG and video data were stored on a computer hard drive with important events saved in digital archive files. The material was reviewed by a physician (electroencephalographer / epileptologist) on a daily basis. Deon and seizure detection algorithms were utilized and reviewed. An EEG Technician attended to the patient, and was available throughout daytime work hours.  The epilepsy center neurologist was available in person or on call 24-hours per day.    EEG Placement and Labeling of Electrodes:  The EEG was performed utilizing 20 channel referential EEG connections (coronal over temporal over parasagittal montage) using all standard 10-20 electrode placements with EKG, with additional electrodes placed in the inferior temporal region using the modified 10-10 montage electrode placements for elective admissions, or if deemed necessary. Recording was at a sampling rate of 256 samples per second per channel. Time synchronized digital video recording was done simultaneously with EEG recording. A low light infrared camera was used for low light recording.     -------------------------------------------------------------------------------------------------------------------------------------------------------  HISTORY:  Patient MORE ANDREW is a 38y (1985) woman with a PMHx significant for Hashimoto's and hemochromatosis who presented after a few days of generalized weakness, COVID+, occasional bilateral tingling in her upper extremities > lower extremities.     Home Antiepileptic Medication and Device  None    -------------------------------------------------------------------------------------------------------------------------------------------------------  INTERPRETATION:    DAY 1 	START: 2024  16:00  	END: 2024 08:00  	DURATION: 18 hours    DAILY EEG VISUAL ANALYSIS    The background was continuous, symmetric, spontaneously variable and reactive. During wakefulness, the posterior dominant rhythm consisted of symmetric, well-modulated 11 Hz activity, with amplitude to 30 uV, that attenuated to eye opening.  Low amplitude frontal beta was noted in wakefulness.   The anterior to posterior gradient was present.     BACKGROUND SLOWING:  No generalized background slowing was present.    FOCAL SLOWING:   None was present.    SLEEP BACKGROUND:  Drowsiness was characterized by fragmentation, attenuation, and slowing of the background activity.    Sleep was characterized by the presence of vertex waves, symmetric sleep spindles and K-complexes.    OTHER NON-EPILEPTIFORM FINDINGS:  None were present.    ACTIVATION PROCEDURES:   Hyperventilation was not performed.    Photic stimulation was not performed.    INTERICTAL EPILEPTIFORM ACTIVITY:   None were present.    EVENTS:  No events or seizures recorded.    ARTIFACTS:  Intermittent myogenic and movement artifacts were noted.    ECG:  The heart rate on single channel ECG was predominantly between 60-80 BPM.    ASMs:   None  -------------------------------------------------------------------------------------------------------------------------------------------------------  EEG SUMMARY:  Normal EEG in the awake, drowsy and asleep states.    -------------------------------------------------------------------------------------------------------------------------------------------------------  IMPRESSION/CLINICAL CORRELATE:  This is a normal VEEG. No epileptiform pattern or seizures were recorded    -------------------------------------------------------------------------------------------------------------------------------------------------------      Shandra Lincoln MD  Neurology Attending Physician

## 2024-09-29 NOTE — DISCHARGE NOTE NURSING/CASE MANAGEMENT/SOCIAL WORK - NSCORESITESY/N_GEN_A_CORE_RD
resting/  no cp/sob  no seizures  REVIEW OF SYSTEMS:  GEN: no fever,    no chills  RESP: no SOB,   no cough  CVS: no chest pain,   no palpitations  GI: no abdominal pain,   no nausea,   no vomiting,   no constipation,   no diarrhea  : no dysuria,   no frequency  NEURO: no headache,   no dizziness  PSYCH: no depression,   not anxious  Derm : no rash    MEDICATIONS  (STANDING):  clonazePAM  Tablet 2 milliGRAM(s) Oral at bedtime  diVALproex  milliGRAM(s) Oral at bedtime  heparin  Injectable 5000 Unit(s) SubCutaneous every 12 hours  lacosamide 50 milliGRAM(s) Oral two times a day  metoprolol tartrate 25 milliGRAM(s) Oral two times a day  risperiDONE   Tablet 2 milliGRAM(s) Oral two times a day  sodium chloride 0.9%. 1000 milliLiter(s) (50 mL/Hr) IV Continuous <Continuous>    MEDICATIONS  (PRN):      Vital Signs Last 24 Hrs  T(C): 36.4 (19 May 2019 05:15), Max: 36.6 (18 May 2019 10:55)  T(F): 97.5 (19 May 2019 05:15), Max: 97.9 (18 May 2019 10:55)  HR: 82 (19 May 2019 05:15) (75 - 130)  BP: 106/76 (19 May 2019 05:15) (73/59 - 132/96)  BP(mean): 75 (18 May 2019 11:30) (75 - 82)  RR: 19 (19 May 2019 05:15) (12 - 22)  SpO2: 96% (19 May 2019 05:15) (95% - 99%)  CAPILLARY BLOOD GLUCOSE      POCT Blood Glucose.: 99 mg/dL (18 May 2019 10:46)    I&O's Summary    18 May 2019 07:01  -  19 May 2019 07:00  --------------------------------------------------------  IN: 640 mL / OUT: 0 mL / NET: 640 mL        PHYSICAL EXAM:  HEAD:  Atraumatic, Normocephalic  NECK: Supple, No   JVD  CHEST/LUNG:   no     rales,     no,    rhonchi  HEART: Regular rate and rhythm;         murmur  ABDOMEN: Soft, Nontender, ;   EXTREMITIES:     no   edema  NEUROLOGY:  awake    LABS:                        13.1   6.7   )-----------( 197      ( 18 May 2019 10:54 )             41.1         137  |  97  |  10  ----------------------------<  102<H>  3.7   |  29  |  1.20    Ca    8.9      18 May 2019 10:54  Phos  3.9       Mg     1.8         TPro  6.2  /  Alb  3.8  /  TBili  1.2  /  DBili  x   /  AST  13  /  ALT  10  /  AlkPhos  44      PT/INR - ( 18 May 2019 10:54 )   PT: 13.9 sec;   INR: 1.20 ratio         PTT - ( 18 May 2019 10:54 )  PTT:30.6 sec      Urinalysis Basic - ( 18 May 2019 11:17 )    Color: Yellow / Appearance: Clear / S.017 / pH: x  Gluc: x / Ketone: Negative  / Bili: Negative / Urobili: Negative   Blood: x / Protein: Trace / Nitrite: Negative   Leuk Esterase: Negative / RBC: 1 /hpf / WBC 1 /HPF   Sq Epi: x / Non Sq Epi: 1 /hpf / Bacteria: Negative                      Consultant(s) Notes Reviewed:      Care Discussed with Consultants/Other Providers: No

## 2024-09-29 NOTE — DISCHARGE NOTE PROVIDER - NSDCCPCAREPLAN_GEN_ALL_CORE_FT
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  There may be an element of functional overlay as well.   Neurological deficits may include weakness, numbness, changes in vision, or difficulty speaking, signaling potential damage to brain function.   While here you underwent MRI of the brain which did not show anything acute or concerning. MRI uses magnets and radio waves to create detailed pictures of the structures inside your brain. Unlike X-rays or CT scans, an MRI doesn’t use radiation, making it a safer option for viewing soft tissues like the brain.  You should follow up with headache specialist outpatient (Dr Vallejo or Dr. Soriano) at [address] [place] [address].    
Assistance OOB with selected safe patient handling equipment/Assistance with ambulation/Communicate Fall Risk and Risk Factors to all staff, patient, and family/Discuss with provider need for PT consult/Monitor gait and stability/Provide patient with walking aids - walker, cane, crutches/Reinforce activity limits and safety measures with patient and family/Sit up slowly, dangle for a short time, stand at bedside before walking/Use of alarms - bed, chair and/or voice tab/Visual Cue: Yellow wristband/Bed in lowest position, wheels locked, appropriate side rails in place/Call bell, personal items and telephone in reach/Instruct patient to call for assistance before getting out of bed or chair/Non-slip footwear when patient is out of bed/[place] to call system/Physically safe environment - no spills, clutter or unnecessary equipment/Purposeful Proactive Rounding/Room/bathroom lighting operational, light cord in reach

## 2024-09-29 NOTE — PROGRESS NOTE ADULT - ASSESSMENT
Assessment: 38 yo R handed female w PMHx of hashimoto's and viral meningitis 2023 presenting as code stroke for R side weakness and numbness. Per patient and , patient woke up around 0000 because a painful ear popping sensation work her. She had gone to bed at 2330. After the popping sensation she experienced severe numbness of weakness start from her R face then go to her arm then to her leg. The patient and her  report that this episode is quite similar to her episodes of neurologic deficit when she was diagnosed with autoimmune encephalitis in 10/2023. The patient reported that previously she had had ear popping then weakness on the L that progress to involve the right and she had a previous episode of ear popping then R side weakness. The patient presentation was discussed with the stroke attending and the patient clinical presentation though localizable seems more consistent with her previous encephalitis sequelae so tenecteplase was NOT given.  Initial VS in ED: 118/78  mRS: 0-1 (patient has continued to have recurring episodes of sequelae to autoimmune encephalitis)  LKN: 0000 09/28/2024  NIHSS: 6 (RUE and RLE drift, extinction, sense decrease)    NOT a tenecteplase candidate due to The patient presentation was discussed with the stroke attending and the patient clinical presentation though localizable seems more consistent with her previous encephalitis sequelae so tenecteplase was NOT given.  NOT a mechanical thrombectomy candidate due to no LVO    Impression:  RESOLVED: R side weakness WO facial weakness and R side numbness W whole face involvement. Localization to L brain dysfunction. Etiology unclear given sudden onset of symptoms increasing concern for ischemia vs similarity to previous episodes that were linked to suspected autoimmune encephalitis. Differentials include migraine w neurologic sequelae     Recommendations:  [] Discharge home today with close outpatient follow up with Dr. Vallejo or Dr. Soriano (headache). Patient can follow up with  [address] [place] [address] 1-2 weeks after discharge. Please instruct the patient to call [phone] to schedule this appointment.  [] No neurological contraindication to discharge as patient is otherwise stable  []-Continue home escitaloram 5  -Continue home famotidine 20 BID  -Continue home naproxen 250mg BID PRN    Diagnostics:  [] MRI brain without contrast = unremarkable   [] CBC, CMP, coagulation panel, troponin    Seen and discussed with Dr Lincoln.   Carrie Macdonald

## 2024-09-29 NOTE — PROGRESS NOTE ADULT - SUBJECTIVE AND OBJECTIVE BOX
~~~~~~~~~~~~~~~~~~~~~~~~~~~~~~~~~~~~~~~~~~~~~~~~~~  Neurology Service   [facility]- [facility] [id number]~  History: Doing well today. Headache is improved. Understands that she should follow up w/ outpatient headache specialist. Planning on going to an [place] next week.       Review of Systems:      CONSTITUTIONAL: No fevers or chills  EYES AND ENT: No visual changes or no throat pain   NECK: No pain or stiffness  RESPIRATORY: No hemoptysis or shortness of breath  CARDIOVASCULAR: No chest pain or palpitations  GASTROINTESTINAL: No melena or hematochezia  GENITOURINARY: No dysuria or hematuria  NEUROLOGICAL: +As stated in HPI above    Medications  Home Medications:  Lexapro 5 mg oral tablet: 1 tab(s) orally once a day (28 Sep 2024 05:36)    MEDICATIONS  (STANDING):  enoxaparin Injectable 40 milliGRAM(s) SubCutaneous every 24 hours  escitalopram 5 milliGRAM(s) Oral daily  famotidine    Tablet 20 milliGRAM(s) Oral two times a day    MEDICATIONS  (PRN):  naproxen 250 milliGRAM(s) Oral every 12 hours PRN Moderate Pain (4 - 6)      Exam:  Vitals:  Vital Signs Last 24 Hrs  T(C): 36.6 (09-29-24 @ 12:34), Max: 36.9 (09-28-24 @ 18:16)  T(F): 97.9 (09-29-24 @ 12:34), Max: 98.5 (09-28-24 @ 18:16)  HR: 71 (09-29-24 @ 12:34) (66 - 78)  BP: 98/69 (09-29-24 @ 12:34) (96/62 - 104/69)  BP(mean): --  RR: 17 (09-29-24 @ 12:34) (17 - 19)  SpO2: 97% (09-29-24 @ 12:34) (97% - 98%)      I&O's Summary    28 Sep 2024 07:01  -  29 Sep 2024 07:00  --------------------------------------------------------  IN: 480 mL / OUT: 0 mL / NET: 480 mL    29 Sep 2024 07:01  -  29 Sep 2024 13:32  --------------------------------------------------------  IN: 720 mL / OUT: 0 mL / NET: 720 mL          Physical and Neurological Examination:   - General:    - Mental Status:   level of consciousness: Awake, alert  Orientation: Oriented to person, age, place, and time  Language: Speech is fluent with intact naming, repetition, and ability to follow commands (including simple commands and complex cross commands)  Cortical Signs: No extinction to visual or tactile DSS, no hemineglect.   Memory & Concentration: Immediate recall is 3/3 words and delayed recall is 3/3 words at 5 minutes; able to spell WORLD backwards and recite the days of the week in reverseable to read a sentence.  Content: Good overall fund of knowledge (aware of current events,and relevant past history)    - Cranial Nerves:   PERRL, VFF, EOMI, facial sensation is intact in the V1-V3 distribution bilaterally; face is symmetric with chester smile; hearing is intact to finger rub bilaterally and equally; uvula is midline and soft palate rises symmetrically; shoulder shrug intact; tongue protrudes in the midline with intact lateral movements    - Motor Testing:  Bulk: Normal   Tone: Normal  Strength:  There is no pronator drift b/l  There is no leg drift b/l    - Sensory: Intact throughout to light touch.   - Coordination: Finger-nose-finger intact without dysmetria.    - Gait: Normal steps, base, arm swing, and turning.      Objective Studies  Labs:                          12.3   7.35  )-----------( 250      ( 28 Sep 2024 06:18 )             36.5     09-28    140  |  107  |  11  ----------------------------<  117[H]  4.1   |  25  |  0.85    Ca    9.3      28 Sep 2024 06:18    TPro  6.6  /  Alb  4.3  /  TBili  0.2  /  DBili  x   /  AST  8[L]  /  ALT  9[L]  /  AlkPhos  71  09-28

## 2024-09-29 NOTE — DISCHARGE NOTE PROVIDER - CARE PROVIDER_API CALL
Mario Alberto Vallejo  Neurology  [address] [facility], [facility] [address]  [place] [address]  Phone: [phone]  Fax: [phone]  Follow Up Time:     Hadley Soriano  Pain Medicine  [address] [facility], [facility] [address]  [place] [address]  Phone: [phone]  Fax: [phone]  Follow Up Time:

## 2024-10-04 ENCOUNTER — NON-APPOINTMENT (OUTPATIENT)
Age: 39
End: 2024-10-04

## 2024-10-29 ENCOUNTER — APPOINTMENT (OUTPATIENT)
Dept: NEUROLOGY | Facility: CLINIC | Age: 39
End: 2024-10-29
Payer: COMMERCIAL

## 2024-10-29 VITALS
BODY MASS INDEX: 23.92 KG/M2 | WEIGHT: 130 LBS | SYSTOLIC BLOOD PRESSURE: 112 MMHG | DIASTOLIC BLOOD PRESSURE: 71 MMHG | HEIGHT: 62 IN | HEART RATE: 77 BPM

## 2024-10-29 DIAGNOSIS — E06.3 AUTOIMMUNE THYROIDITIS: ICD-10-CM

## 2024-10-29 DIAGNOSIS — F41.9 ANXIETY DISORDER, UNSPECIFIED: ICD-10-CM

## 2024-10-29 DIAGNOSIS — Z91.89 OTHER SPECIFIED PERSONAL RISK FACTORS, NOT ELSEWHERE CLASSIFIED: ICD-10-CM

## 2024-10-29 PROCEDURE — 99214 OFFICE O/P EST MOD 30 MIN: CPT

## 2024-10-29 RX ORDER — LEVOTHYROXINE SODIUM 0.03 MG/1
25 TABLET ORAL
Refills: 0 | Status: ACTIVE | COMMUNITY

## 2024-11-04 ENCOUNTER — TRANSCRIPTION ENCOUNTER (OUTPATIENT)
Age: 39
End: 2024-11-04

## 2024-11-04 ENCOUNTER — NON-APPOINTMENT (OUTPATIENT)
Age: 39
End: 2024-11-04

## 2025-01-09 ENCOUNTER — NON-APPOINTMENT (OUTPATIENT)
Age: 40
End: 2025-01-09

## 2025-01-14 ENCOUNTER — APPOINTMENT (OUTPATIENT)
Dept: INTERNAL MEDICINE | Facility: CLINIC | Age: 40
End: 2025-01-14
Payer: COMMERCIAL

## 2025-01-14 VITALS
BODY MASS INDEX: 23.92 KG/M2 | DIASTOLIC BLOOD PRESSURE: 64 MMHG | WEIGHT: 130 LBS | SYSTOLIC BLOOD PRESSURE: 106 MMHG | TEMPERATURE: 97.7 F | HEIGHT: 62 IN | OXYGEN SATURATION: 99 % | RESPIRATION RATE: 14 BRPM | HEART RATE: 60 BPM

## 2025-01-14 DIAGNOSIS — J01.90 ACUTE SINUSITIS, UNSPECIFIED: ICD-10-CM

## 2025-01-14 DIAGNOSIS — E06.3 AUTOIMMUNE THYROIDITIS: ICD-10-CM

## 2025-01-14 PROCEDURE — 99214 OFFICE O/P EST MOD 30 MIN: CPT

## 2025-01-14 PROCEDURE — G2211 COMPLEX E/M VISIT ADD ON: CPT | Mod: NC

## 2025-01-14 RX ORDER — AZITHROMYCIN 250 MG/1
250 TABLET, FILM COATED ORAL
Qty: 1 | Refills: 0 | Status: ACTIVE | COMMUNITY
Start: 2025-01-14 | End: 1900-01-01

## 2025-01-15 LAB
T3 SERPL-MCNC: 92 NG/DL
T4 FREE SERPL-MCNC: 1.7 NG/DL
THYROPEROXIDASE AB SERPL IA-ACNC: 441 IU/ML
TSH SERPL-ACNC: 2.29 UIU/ML

## 2025-01-16 ENCOUNTER — NON-APPOINTMENT (OUTPATIENT)
Age: 40
End: 2025-01-16

## 2025-01-16 ENCOUNTER — APPOINTMENT (OUTPATIENT)
Dept: NEUROLOGY | Facility: CLINIC | Age: 40
End: 2025-01-16
Payer: COMMERCIAL

## 2025-01-16 VITALS
HEART RATE: 75 BPM | WEIGHT: 130 LBS | HEIGHT: 62 IN | BODY MASS INDEX: 23.92 KG/M2 | DIASTOLIC BLOOD PRESSURE: 75 MMHG | SYSTOLIC BLOOD PRESSURE: 112 MMHG

## 2025-01-16 DIAGNOSIS — A87.9 VIRAL MENINGITIS, UNSPECIFIED: ICD-10-CM

## 2025-01-16 DIAGNOSIS — R29.898 OTHER SYMPTOMS AND SIGNS INVOLVING THE MUSCULOSKELETAL SYSTEM: ICD-10-CM

## 2025-01-16 DIAGNOSIS — Z91.89 OTHER SPECIFIED PERSONAL RISK FACTORS, NOT ELSEWHERE CLASSIFIED: ICD-10-CM

## 2025-01-16 PROCEDURE — G2211 COMPLEX E/M VISIT ADD ON: CPT | Mod: NC

## 2025-01-16 PROCEDURE — 99213 OFFICE O/P EST LOW 20 MIN: CPT

## 2025-01-27 ENCOUNTER — APPOINTMENT (OUTPATIENT)
Dept: PAIN MANAGEMENT | Facility: CLINIC | Age: 40
End: 2025-01-27

## 2025-01-29 ENCOUNTER — TRANSCRIPTION ENCOUNTER (OUTPATIENT)
Age: 40
End: 2025-01-29

## 2025-02-05 ENCOUNTER — APPOINTMENT (OUTPATIENT)
Dept: NEUROLOGY | Facility: CLINIC | Age: 40
End: 2025-02-05

## 2025-02-06 ENCOUNTER — APPOINTMENT (OUTPATIENT)
Dept: MAMMOGRAPHY | Facility: CLINIC | Age: 40
End: 2025-02-06
Payer: COMMERCIAL

## 2025-02-06 ENCOUNTER — APPOINTMENT (OUTPATIENT)
Dept: ULTRASOUND IMAGING | Facility: CLINIC | Age: 40
End: 2025-02-06
Payer: COMMERCIAL

## 2025-02-06 PROCEDURE — 76641 ULTRASOUND BREAST COMPLETE: CPT | Mod: 50

## 2025-02-06 PROCEDURE — 77063 BREAST TOMOSYNTHESIS BI: CPT

## 2025-02-06 PROCEDURE — 77067 SCR MAMMO BI INCL CAD: CPT

## 2025-02-24 ENCOUNTER — APPOINTMENT (OUTPATIENT)
Dept: NEUROLOGY | Facility: CLINIC | Age: 40
End: 2025-02-24

## 2025-02-24 VITALS
WEIGHT: 135 LBS | SYSTOLIC BLOOD PRESSURE: 108 MMHG | HEIGHT: 62 IN | BODY MASS INDEX: 24.84 KG/M2 | HEART RATE: 93 BPM | DIASTOLIC BLOOD PRESSURE: 64 MMHG

## 2025-02-24 PROCEDURE — 99214 OFFICE O/P EST MOD 30 MIN: CPT

## 2025-03-05 ENCOUNTER — APPOINTMENT (OUTPATIENT)
Dept: ENDOCRINOLOGY | Facility: CLINIC | Age: 40
End: 2025-03-05
Payer: COMMERCIAL

## 2025-03-05 VITALS
SYSTOLIC BLOOD PRESSURE: 97 MMHG | DIASTOLIC BLOOD PRESSURE: 63 MMHG | HEART RATE: 83 BPM | OXYGEN SATURATION: 98 % | WEIGHT: 140 LBS | HEIGHT: 62 IN | BODY MASS INDEX: 25.76 KG/M2

## 2025-03-05 DIAGNOSIS — E06.3 AUTOIMMUNE THYROIDITIS: ICD-10-CM

## 2025-03-05 PROCEDURE — 99213 OFFICE O/P EST LOW 20 MIN: CPT

## 2025-03-05 RX ORDER — LEVOTHYROXINE SODIUM 0.03 MG/1
25 TABLET ORAL DAILY
Qty: 90 | Refills: 1 | Status: ACTIVE | COMMUNITY
Start: 2025-03-05 | End: 1900-01-01

## 2025-03-06 NOTE — PATIENT PROFILE ADULT - NSPROPTRIGHTREPNAME_GEN_A__NUR
Chief Complaint   Patient presents with    Weakness     URI symptoms x3-4 days      Arrives with wife c/o significant weakness, SOB, productive cough. Hypoxic and febrile at triage, RA O2 86%. AAO4.   Laura Malhotra	 Luara Malhotra

## 2025-04-18 ENCOUNTER — RX RENEWAL (OUTPATIENT)
Age: 40
End: 2025-04-18

## 2025-05-09 NOTE — DISCHARGE NOTE PROVIDER - EXTENDED VTE YES NO FOR MLM ASPIRIN
Pt brought to 61, alert, interactive appropriate for age, eating waffle chips provided by  mom without difficulty    ,

## 2025-05-14 ENCOUNTER — APPOINTMENT (OUTPATIENT)
Dept: INTERNAL MEDICINE | Facility: CLINIC | Age: 40
End: 2025-05-14
Payer: COMMERCIAL

## 2025-05-14 VITALS
BODY MASS INDEX: 25.76 KG/M2 | DIASTOLIC BLOOD PRESSURE: 64 MMHG | WEIGHT: 140 LBS | RESPIRATION RATE: 14 BRPM | SYSTOLIC BLOOD PRESSURE: 98 MMHG | HEART RATE: 87 BPM | HEIGHT: 62 IN | OXYGEN SATURATION: 98 % | TEMPERATURE: 98.9 F

## 2025-05-14 DIAGNOSIS — J02.9 ACUTE PHARYNGITIS, UNSPECIFIED: ICD-10-CM

## 2025-05-14 LAB
FLUAV SPEC QL CULT: NORMAL
FLUBV AG SPEC QL IA: NORMAL
S PYO AG SPEC QL IA: NORMAL
SARS-COV-2 AG RESP QL IA.RAPID: NEGATIVE

## 2025-05-14 PROCEDURE — 87880 STREP A ASSAY W/OPTIC: CPT | Mod: QW

## 2025-05-14 PROCEDURE — 99213 OFFICE O/P EST LOW 20 MIN: CPT

## 2025-05-14 PROCEDURE — G2211 COMPLEX E/M VISIT ADD ON: CPT | Mod: NC

## 2025-05-14 PROCEDURE — 87804 INFLUENZA ASSAY W/OPTIC: CPT | Mod: QW

## 2025-05-14 PROCEDURE — 87811 SARS-COV-2 COVID19 W/OPTIC: CPT | Mod: QW

## 2025-06-16 ENCOUNTER — APPOINTMENT (OUTPATIENT)
Dept: ENDOCRINOLOGY | Facility: CLINIC | Age: 40
End: 2025-06-16
Payer: COMMERCIAL

## 2025-06-16 VITALS
DIASTOLIC BLOOD PRESSURE: 78 MMHG | BODY MASS INDEX: 30.91 KG/M2 | SYSTOLIC BLOOD PRESSURE: 114 MMHG | HEIGHT: 62 IN | OXYGEN SATURATION: 98 % | RESPIRATION RATE: 16 BRPM | HEART RATE: 80 BPM | WEIGHT: 168 LBS

## 2025-06-16 PROCEDURE — 99214 OFFICE O/P EST MOD 30 MIN: CPT

## 2025-08-12 ENCOUNTER — APPOINTMENT (OUTPATIENT)
Dept: INTERNAL MEDICINE | Facility: CLINIC | Age: 40
End: 2025-08-12
Payer: COMMERCIAL

## 2025-08-12 DIAGNOSIS — B02.9 ZOSTER W/OUT COMPLICATIONS: ICD-10-CM

## 2025-08-12 PROCEDURE — 99212 OFFICE O/P EST SF 10 MIN: CPT | Mod: 95

## 2025-08-12 PROCEDURE — G2211 COMPLEX E/M VISIT ADD ON: CPT | Mod: NC,95

## 2025-08-12 RX ORDER — VALACYCLOVIR 1 G/1
1 TABLET, FILM COATED ORAL 3 TIMES DAILY
Qty: 21 | Refills: 0 | Status: ACTIVE | COMMUNITY
Start: 2025-08-12 | End: 1900-01-01

## 2025-08-14 ENCOUNTER — TRANSCRIPTION ENCOUNTER (OUTPATIENT)
Age: 40
End: 2025-08-14

## 2025-08-14 ENCOUNTER — APPOINTMENT (OUTPATIENT)
Dept: INTERNAL MEDICINE | Facility: CLINIC | Age: 40
End: 2025-08-14

## 2025-08-18 ENCOUNTER — RESULT REVIEW (OUTPATIENT)
Age: 40
End: 2025-08-18

## 2025-08-18 ENCOUNTER — TRANSCRIPTION ENCOUNTER (OUTPATIENT)
Age: 40
End: 2025-08-18